# Patient Record
Sex: FEMALE | Race: BLACK OR AFRICAN AMERICAN | NOT HISPANIC OR LATINO | Employment: FULL TIME | URBAN - METROPOLITAN AREA
[De-identification: names, ages, dates, MRNs, and addresses within clinical notes are randomized per-mention and may not be internally consistent; named-entity substitution may affect disease eponyms.]

---

## 2017-04-17 ENCOUNTER — HOSPITAL ENCOUNTER (INPATIENT)
Facility: HOSPITAL | Age: 32
LOS: 2 days | Discharge: HOME | DRG: 392 | End: 2017-04-19
Attending: EMERGENCY MEDICINE | Admitting: INTERNAL MEDICINE
Payer: MEDICAID

## 2017-04-17 DIAGNOSIS — F33.9 EPISODE OF RECURRENT MAJOR DEPRESSIVE DISORDER, UNSPECIFIED DEPRESSION EPISODE SEVERITY: ICD-10-CM

## 2017-04-17 DIAGNOSIS — R10.9 ABDOMINAL PAIN, UNSPECIFIED LOCATION: ICD-10-CM

## 2017-04-17 DIAGNOSIS — F41.9 ANXIETY: ICD-10-CM

## 2017-04-17 DIAGNOSIS — K52.9 COLITIS: Primary | ICD-10-CM

## 2017-04-17 LAB
ALBUMIN SERPL BCP-MCNC: 4.1 G/DL
ALP SERPL-CCNC: 70 U/L
ALT SERPL W/O P-5'-P-CCNC: 19 U/L
ANION GAP SERPL CALC-SCNC: 11 MMOL/L
AST SERPL-CCNC: 20 U/L
BASOPHILS NFR BLD: 0 %
BILIRUB SERPL-MCNC: 0.9 MG/DL
BUN SERPL-MCNC: 10 MG/DL
CALCIUM SERPL-MCNC: 9.7 MG/DL
CHLORIDE SERPL-SCNC: 111 MMOL/L
CO2 SERPL-SCNC: 19 MMOL/L
CREAT SERPL-MCNC: 0.9 MG/DL
DIFFERENTIAL METHOD: ABNORMAL
EOSINOPHIL NFR BLD: 2 %
ERYTHROCYTE [DISTWIDTH] IN BLOOD BY AUTOMATED COUNT: 14.9 %
EST. GFR  (AFRICAN AMERICAN): >60 ML/MIN/1.73 M^2
EST. GFR  (NON AFRICAN AMERICAN): >60 ML/MIN/1.73 M^2
GLUCOSE SERPL-MCNC: 152 MG/DL
HCG SERPL QL: NEGATIVE
HCT VFR BLD AUTO: 47.9 %
HGB BLD-MCNC: 15.8 G/DL
LIPASE SERPL-CCNC: 8 U/L
LYMPHOCYTES NFR BLD: 15 %
MCH RBC QN AUTO: 30.1 PG
MCHC RBC AUTO-ENTMCNC: 32.9 %
MCV RBC AUTO: 92 FL
MONOCYTES NFR BLD: 2 %
NEUTROPHILS NFR BLD: 79 %
NEUTS BAND NFR BLD MANUAL: 2 %
PLATELET # BLD AUTO: 233 K/UL
PMV BLD AUTO: 8.4 FL
POTASSIUM SERPL-SCNC: 4.5 MMOL/L
PROT SERPL-MCNC: 8.5 G/DL
RBC # BLD AUTO: 5.23 M/UL
SODIUM SERPL-SCNC: 141 MMOL/L
WBC # BLD AUTO: 22.5 K/UL

## 2017-04-17 PROCEDURE — 84703 CHORIONIC GONADOTROPIN ASSAY: CPT

## 2017-04-17 PROCEDURE — 96375 TX/PRO/DX INJ NEW DRUG ADDON: CPT

## 2017-04-17 PROCEDURE — 25000003 PHARM REV CODE 250: Performed by: EMERGENCY MEDICINE

## 2017-04-17 PROCEDURE — 63600175 PHARM REV CODE 636 W HCPCS: Performed by: EMERGENCY MEDICINE

## 2017-04-17 PROCEDURE — 85007 BL SMEAR W/DIFF WBC COUNT: CPT

## 2017-04-17 PROCEDURE — 85027 COMPLETE CBC AUTOMATED: CPT

## 2017-04-17 PROCEDURE — 86140 C-REACTIVE PROTEIN: CPT

## 2017-04-17 PROCEDURE — 36415 COLL VENOUS BLD VENIPUNCTURE: CPT

## 2017-04-17 PROCEDURE — 99285 EMERGENCY DEPT VISIT HI MDM: CPT | Mod: 25

## 2017-04-17 PROCEDURE — 96365 THER/PROPH/DIAG IV INF INIT: CPT

## 2017-04-17 PROCEDURE — 12000002 HC ACUTE/MED SURGE SEMI-PRIVATE ROOM

## 2017-04-17 PROCEDURE — 25500020 PHARM REV CODE 255

## 2017-04-17 PROCEDURE — 96376 TX/PRO/DX INJ SAME DRUG ADON: CPT

## 2017-04-17 PROCEDURE — 83690 ASSAY OF LIPASE: CPT

## 2017-04-17 PROCEDURE — 99222 1ST HOSP IP/OBS MODERATE 55: CPT | Mod: ,,, | Performed by: INTERNAL MEDICINE

## 2017-04-17 PROCEDURE — 80053 COMPREHEN METABOLIC PANEL: CPT

## 2017-04-17 RX ORDER — VENLAFAXINE HYDROCHLORIDE 150 MG/1
75 CAPSULE, EXTENDED RELEASE ORAL DAILY
COMMUNITY
End: 2018-12-11 | Stop reason: CLARIF

## 2017-04-17 RX ORDER — ONDANSETRON 2 MG/ML
4 INJECTION INTRAMUSCULAR; INTRAVENOUS
Status: COMPLETED | OUTPATIENT
Start: 2017-04-17 | End: 2017-04-17

## 2017-04-17 RX ORDER — MORPHINE SULFATE 4 MG/ML
4 INJECTION, SOLUTION INTRAMUSCULAR; INTRAVENOUS
Status: COMPLETED | OUTPATIENT
Start: 2017-04-17 | End: 2017-04-17

## 2017-04-17 RX ADMIN — PROMETHAZINE HYDROCHLORIDE 12.5 MG: 25 INJECTION, SOLUTION INTRAMUSCULAR; INTRAVENOUS at 09:04

## 2017-04-17 RX ADMIN — IOHEXOL 75 ML: 350 INJECTION, SOLUTION INTRAVENOUS at 10:04

## 2017-04-17 RX ADMIN — MORPHINE SULFATE 4 MG: 4 INJECTION INTRAVENOUS at 09:04

## 2017-04-17 RX ADMIN — LIDOCAINE HYDROCHLORIDE: 20 SOLUTION ORAL; TOPICAL at 08:04

## 2017-04-17 RX ADMIN — MORPHINE SULFATE 4 MG: 4 INJECTION INTRAVENOUS at 08:04

## 2017-04-17 RX ADMIN — PIPERACILLIN SODIUM AND TAZOBACTAM SODIUM 4.5 G: 4; .5 INJECTION, POWDER, LYOPHILIZED, FOR SOLUTION INTRAVENOUS at 09:04

## 2017-04-17 RX ADMIN — ONDANSETRON 4 MG: 2 INJECTION INTRAMUSCULAR; INTRAVENOUS at 08:04

## 2017-04-17 NOTE — IP AVS SNAPSHOT
[address] [place] [address]  Phone: [phone]           Patient Discharge Instructions   Our goal is to set you up for success. This packet includes information on your condition, medications, and your home care.  It will help you care for yourself to prevent having to return to the hospital.     Please ask your nurse if you have any questions.      There are many details to remember when preparing to leave the hospital. Here is what you will need to do:    1. Take your medicine. If you are prescribed medications, review your Medication List on the following pages. You may have new medications to  at the pharmacy and others that you'll need to stop taking. Review the instructions for how and when to take your medications. Talk with your doctor or nurses if you are unsure of what to do.     2. Go to your follow-up appointments. Specific follow-up information is listed in the following pages. Your may be contacted by a nurse or clinical provider about future appointments. Be sure we have all of the phone numbers to reach you. Please contact your provider's office if you are unable to make an appointment.     3. Watch for warning signs. Your doctor or nurse will give you detailed warning signs to watch for and when to call for assistance. These instructions may also include educational information about your condition. If you experience any of warning signs to your health, call your doctor.           [facility]  Unless otherwise directed by your provider, please   contact [facility], our nurse care line   that is available for 24/7 assistance.     [phone] (toll-free)     Registered nurses in the [facility]   provide: appointment scheduling, clinical advisement, health education, and other advisory services.                  ** Verify the list of medication(s) below is accurate and up to date. Carry this with you in case of  emergency. If your medications have changed, please notify your healthcare provider.             Medication List      START taking these medications        Additional Info                      ciprofloxacin HCl 500 MG tablet   Commonly known as:  CIPRO   Quantity:  14 tablet   Refills:  0   Dose:  500 mg    Instructions:  Take 1 tablet (500 mg total) by mouth every 12 (twelve) hours.     Begin Date    AM    Noon    PM    Bedtime       metronidazole 500 MG tablet   Commonly known as:  FLAGYL   Quantity:  21 tablet   Refills:  0   Dose:  500 mg   Indications:  infectious diarrhea    Last time this was given:  500 mg on 4/19/2017  1:05 PM   Instructions:  Take 1 tablet (500 mg total) by mouth every 8 (eight) hours.     Begin Date    AM    Noon    PM    Bedtime         CONTINUE taking these medications        Additional Info                      alprazolam 1 MG tablet   Commonly known as:  XANAX   Quantity:  12 tablet   Refills:  0   Dose:  1 mg    Last time this was given:  1 mg on 4/19/2017 10:16 AM   Instructions:  Take 1 tablet (1 mg total) by mouth 2 (two) times daily as needed for Anxiety.     Begin Date    AM    Noon    PM    Bedtime       dicyclomine 10 MG capsule   Commonly known as:  BENTYL   Refills:  0   Dose:  10 mg    Instructions:  Take 10 mg by mouth 4 (four) times daily before meals and nightly.     Begin Date    AM    Noon    PM    Bedtime       fish oil-omega-3 fatty acids 300-1,000 mg capsule   Refills:  0   Dose:  1 g    Instructions:  Take 1 g by mouth 2 (two) times daily.     Begin Date    AM    Noon    PM    Bedtime       valacyclovir 500 MG tablet   Commonly known as:  VALTREX   Refills:  0   Dose:  500 mg    Instructions:  Take 500 mg by mouth 2 (two) times daily.     Begin Date    AM    Noon    PM    Bedtime       venlafaxine 150 MG Cp24   Commonly known as:  EFFEXOR-XR   Refills:  0   Dose:  75 mg    Instructions:  Take 75 mg by mouth once daily.     Begin Date    AM    Noon    PM    Bedtime  "           Where to Get Your Medications      You can get these medications from any pharmacy     Bring a paper prescription for each of these medications     ciprofloxacin HCl 500 MG tablet    metronidazole 500 MG tablet                  Please bring to all follow up appointments:    1. A copy of your discharge instructions.  2. All medicines you are currently taking in their original bottles.  3. Identification and insurance card.    Please arrive 15 minutes ahead of scheduled appointment time.    Please call 24 hours in advance if you must reschedule your appointment and/or time.        Follow-up Information     Follow up with PCP.    Contact information:    Follow up with a GI specialist at earliest convenience.    Keep yourself well hydrated; Avoid highly concentrated juices and milk products for few days.        Discharge Instructions     Future Orders    Call MD for:     Comments:    For worsening symptoms, chest pain, shortness of breath, increased abdominal pain, high grade fever, stroke or stroke like symptoms, immediately go to the nearest Emergency Room or call 911 as soon as possible.    Diet general     Comments:    Cardiac/ 2 gram sodium low cholesterol diet    Questions:    Total calories:      Fat restriction, if any:      Protein restriction, if any:      Na restriction, if any:      Fluid restriction:      Additional restrictions:      Other restrictions (specify):     Comments:    Fall precautions        Primary Diagnosis     Your primary diagnosis was:  Inflammation Of Large Intestine      Admission Information     Date & Time Provider Department CSN    4/17/2017  7:43 PM Wilfredo Michaud MD [facility] [id number]      Care Providers     Provider Role Specialty Primary office phone    Wilfredo Michaud MD Attending Provider Internal Medicine [phone]      Your Vitals Were     BP Pulse Temp Resp Height Weight    115/79 84 98.1 °F (36.7 °C) (Oral) 16 5' 5" (1.651 m) 79.4 kg (175 lb)    " SpO2 BMI             100% 29.12 kg/m2         Recent Lab Values     No lab values to display.      Pending Labs     Order Current Status    E. coli 0157 antigen In process    Stool Exam-Ova,Cysts,Parasites In process    Stool culture Preliminary result      Allergies as of 4/19/2017     No Known Allergies      Advance Directives     An advance directive is a document which, in the event you are no longer able to make decisions for yourself, tells your healthcare team what kind of treatment you do or do not want to receive, or who you would like to make those decisions for you.  If you do not currently have an advance directive, [facility] encourages you to create one.  For more information call:  [phone]), [phone] ([phone]),  or log on to [email or web address].        Smoking Cessation     If you would like to quit smoking:   You may be eligible for free services if you are a [place] resident and started smoking cigarettes before September 1, 1988.  Call the Smoking Cessation Trust ([facility]) toll free at ([phone] or [phone]) 924-3764.   Call 2-067-QUIT-NOW if you do not meet the above criteria.   Contact us via email: [email or web address]   View our website for more information: [email or web address]        Language Assistance Services     ATTENTION: Language assistance services are available, free of charge. Please call [phone].      ATENCIÓN: Si habla español, tiene a rios disposición servicios gratuitos de asistencia lingüística. Llame al [phone].     CHÚ Ý: N?u b?n nói Ti?ng Vi?t, có các d?ch v? h? tr? ngôn ng? mi?n phí dành cho b?n. G?i s? [phone].         [facility] complies with applicable Federal civil rights laws and does not discriminate on the basis of race, color, national origin, age, disability, or sex.

## 2017-04-18 PROBLEM — F32.9 MAJOR DEPRESSIVE DISORDER WITH CURRENT ACTIVE EPISODE: Status: ACTIVE | Noted: 2017-04-18

## 2017-04-18 PROBLEM — F41.9 ANXIETY: Status: ACTIVE | Noted: 2017-04-18

## 2017-04-18 PROBLEM — R10.9 ABDOMINAL PAIN: Status: ACTIVE | Noted: 2017-04-18

## 2017-04-18 LAB
ANION GAP SERPL CALC-SCNC: 11 MMOL/L
BASOPHILS # BLD AUTO: 0 K/UL
BASOPHILS NFR BLD: 0.3 %
BUN SERPL-MCNC: 8 MG/DL
C DIFF GDH STL QL: NEGATIVE
C DIFF TOX A+B STL QL IA: NEGATIVE
CALCIUM SERPL-MCNC: 9.3 MG/DL
CHLORIDE SERPL-SCNC: 107 MMOL/L
CO2 SERPL-SCNC: 23 MMOL/L
CREAT SERPL-MCNC: 0.9 MG/DL
CRP SERPL-MCNC: 8.2 MG/L
DIFFERENTIAL METHOD: ABNORMAL
EOSINOPHIL # BLD AUTO: 0 K/UL
EOSINOPHIL NFR BLD: 0.1 %
ERYTHROCYTE [DISTWIDTH] IN BLOOD BY AUTOMATED COUNT: 14.5 %
EST. GFR  (AFRICAN AMERICAN): >60 ML/MIN/1.73 M^2
EST. GFR  (NON AFRICAN AMERICAN): >60 ML/MIN/1.73 M^2
GLUCOSE SERPL-MCNC: 118 MG/DL
HCT VFR BLD AUTO: 44.6 %
HGB BLD-MCNC: 14.5 G/DL
LYMPHOCYTES # BLD AUTO: 1.1 K/UL
LYMPHOCYTES NFR BLD: 8 %
MCH RBC QN AUTO: 29.8 PG
MCHC RBC AUTO-ENTMCNC: 32.5 %
MCV RBC AUTO: 92 FL
MONOCYTES # BLD AUTO: 0.6 K/UL
MONOCYTES NFR BLD: 4.1 %
NEUTROPHILS # BLD AUTO: 11.9 K/UL
NEUTROPHILS NFR BLD: 87.5 %
PLATELET # BLD AUTO: 231 K/UL
PMV BLD AUTO: 8.4 FL
POTASSIUM SERPL-SCNC: 3.7 MMOL/L
RBC # BLD AUTO: 4.86 M/UL
SODIUM SERPL-SCNC: 141 MMOL/L
WBC # BLD AUTO: 13.6 K/UL

## 2017-04-18 PROCEDURE — 36415 COLL VENOUS BLD VENIPUNCTURE: CPT

## 2017-04-18 PROCEDURE — 25000003 PHARM REV CODE 250: Performed by: NURSE PRACTITIONER

## 2017-04-18 PROCEDURE — 63600175 PHARM REV CODE 636 W HCPCS: Performed by: NURSE PRACTITIONER

## 2017-04-18 PROCEDURE — 85025 COMPLETE CBC W/AUTO DIFF WBC: CPT

## 2017-04-18 PROCEDURE — 25000003 PHARM REV CODE 250: Performed by: INTERNAL MEDICINE

## 2017-04-18 PROCEDURE — 87449 NOS EACH ORGANISM AG IA: CPT

## 2017-04-18 PROCEDURE — 80048 BASIC METABOLIC PNL TOTAL CA: CPT

## 2017-04-18 PROCEDURE — 99232 SBSQ HOSP IP/OBS MODERATE 35: CPT | Mod: ,,, | Performed by: INTERNAL MEDICINE

## 2017-04-18 PROCEDURE — 87209 SMEAR COMPLEX STAIN: CPT

## 2017-04-18 PROCEDURE — 12000002 HC ACUTE/MED SURGE SEMI-PRIVATE ROOM

## 2017-04-18 PROCEDURE — 87427 SHIGA-LIKE TOXIN AG IA: CPT

## 2017-04-18 PROCEDURE — 87046 STOOL CULTR AEROBIC BACT EA: CPT | Mod: 59

## 2017-04-18 PROCEDURE — 87045 FECES CULTURE AEROBIC BACT: CPT

## 2017-04-18 RX ORDER — METRONIDAZOLE 500 MG/1
500 TABLET ORAL EVERY 8 HOURS
Status: DISCONTINUED | OUTPATIENT
Start: 2017-04-18 | End: 2017-04-19 | Stop reason: HOSPADM

## 2017-04-18 RX ORDER — CIPROFLOXACIN 2 MG/ML
400 INJECTION, SOLUTION INTRAVENOUS
Status: DISCONTINUED | OUTPATIENT
Start: 2017-04-18 | End: 2017-04-18

## 2017-04-18 RX ORDER — ACETAMINOPHEN 325 MG/1
650 TABLET ORAL EVERY 4 HOURS PRN
Status: DISCONTINUED | OUTPATIENT
Start: 2017-04-18 | End: 2017-04-19 | Stop reason: HOSPADM

## 2017-04-18 RX ORDER — AMOXICILLIN 500 MG
1 CAPSULE ORAL 2 TIMES DAILY
COMMUNITY
End: 2020-12-22 | Stop reason: CLARIF

## 2017-04-18 RX ORDER — DICYCLOMINE HYDROCHLORIDE 10 MG/1
10 CAPSULE ORAL EVERY 6 HOURS PRN
Status: DISCONTINUED | OUTPATIENT
Start: 2017-04-18 | End: 2017-04-19 | Stop reason: HOSPADM

## 2017-04-18 RX ORDER — DICYCLOMINE HYDROCHLORIDE 10 MG/ML
20 INJECTION INTRAMUSCULAR EVERY 6 HOURS PRN
Status: DISCONTINUED | OUTPATIENT
Start: 2017-04-18 | End: 2017-04-18

## 2017-04-18 RX ORDER — ONDANSETRON 2 MG/ML
4 INJECTION INTRAMUSCULAR; INTRAVENOUS EVERY 6 HOURS PRN
Status: DISCONTINUED | OUTPATIENT
Start: 2017-04-18 | End: 2017-04-19 | Stop reason: HOSPADM

## 2017-04-18 RX ORDER — METRONIDAZOLE 500 MG/100ML
500 INJECTION, SOLUTION INTRAVENOUS
Status: DISCONTINUED | OUTPATIENT
Start: 2017-04-18 | End: 2017-04-18

## 2017-04-18 RX ORDER — MORPHINE SULFATE 4 MG/ML
4 INJECTION, SOLUTION INTRAMUSCULAR; INTRAVENOUS EVERY 4 HOURS PRN
Status: DISCONTINUED | OUTPATIENT
Start: 2017-04-18 | End: 2017-04-19 | Stop reason: HOSPADM

## 2017-04-18 RX ORDER — VALACYCLOVIR HYDROCHLORIDE 500 MG/1
500 TABLET, FILM COATED ORAL 2 TIMES DAILY
Status: DISCONTINUED | OUTPATIENT
Start: 2017-04-18 | End: 2017-04-19 | Stop reason: HOSPADM

## 2017-04-18 RX ORDER — VENLAFAXINE HYDROCHLORIDE 37.5 MG/1
75 CAPSULE, EXTENDED RELEASE ORAL DAILY
Status: DISCONTINUED | OUTPATIENT
Start: 2017-04-18 | End: 2017-04-19 | Stop reason: HOSPADM

## 2017-04-18 RX ORDER — HYDROCODONE BITARTRATE AND ACETAMINOPHEN 5; 325 MG/1; MG/1
1 TABLET ORAL EVERY 6 HOURS PRN
Status: DISCONTINUED | OUTPATIENT
Start: 2017-04-19 | End: 2017-04-19 | Stop reason: HOSPADM

## 2017-04-18 RX ORDER — SODIUM CHLORIDE 9 MG/ML
INJECTION, SOLUTION INTRAVENOUS CONTINUOUS
Status: DISCONTINUED | OUTPATIENT
Start: 2017-04-18 | End: 2017-04-19 | Stop reason: HOSPADM

## 2017-04-18 RX ORDER — ALPRAZOLAM 1 MG/1
1 TABLET ORAL 2 TIMES DAILY PRN
Status: DISCONTINUED | OUTPATIENT
Start: 2017-04-18 | End: 2017-04-19 | Stop reason: HOSPADM

## 2017-04-18 RX ADMIN — ALPRAZOLAM 1 MG: 1 TABLET ORAL at 09:04

## 2017-04-18 RX ADMIN — DICYCLOMINE HYDROCHLORIDE 20 MG: 20 INJECTION, SOLUTION INTRAMUSCULAR at 01:04

## 2017-04-18 RX ADMIN — PIPERACILLIN SODIUM AND TAZOBACTAM SODIUM 4.5 G: 4; .5 INJECTION, POWDER, LYOPHILIZED, FOR SOLUTION INTRAVENOUS at 01:04

## 2017-04-18 RX ADMIN — HYDROCODONE BITARTRATE AND ACETAMINOPHEN 1 TABLET: 5; 325 TABLET ORAL at 11:04

## 2017-04-18 RX ADMIN — ALPRAZOLAM 1 MG: 1 TABLET ORAL at 02:04

## 2017-04-18 RX ADMIN — PIPERACILLIN SODIUM AND TAZOBACTAM SODIUM 4.5 G: 4; .5 INJECTION, POWDER, LYOPHILIZED, FOR SOLUTION INTRAVENOUS at 05:04

## 2017-04-18 RX ADMIN — ONDANSETRON 4 MG: 2 INJECTION INTRAMUSCULAR; INTRAVENOUS at 07:04

## 2017-04-18 RX ADMIN — METRONIDAZOLE 500 MG: 500 TABLET ORAL at 10:04

## 2017-04-18 RX ADMIN — ALPRAZOLAM 1 MG: 1 TABLET ORAL at 07:04

## 2017-04-18 RX ADMIN — PIPERACILLIN SODIUM AND TAZOBACTAM SODIUM 4.5 G: 4; .5 INJECTION, POWDER, LYOPHILIZED, FOR SOLUTION INTRAVENOUS at 10:04

## 2017-04-18 RX ADMIN — MORPHINE SULFATE 4 MG: 4 INJECTION INTRAVENOUS at 11:04

## 2017-04-18 RX ADMIN — CIPROFLOXACIN 400 MG: 2 INJECTION, SOLUTION INTRAVENOUS at 02:04

## 2017-04-18 RX ADMIN — SODIUM CHLORIDE: 0.9 INJECTION, SOLUTION INTRAVENOUS at 02:04

## 2017-04-18 NOTE — ED NOTES
Overall condition much improved from arrival. Pt denies pain at this time. Reminded patient of NPO status and provided with ice chips per MD order

## 2017-04-18 NOTE — H&P
[facility] Hospital Medicine  History & Physical    Patient Name: Eva Escalera  MRN: [id number]  Admission Date: 2017  Attending Physician: Wilfredo Michaud MD   Primary Care Provider: Primary Doctor No         Patient information was obtained from patient and ER records.     Subjective:     Principal Problem:Colitis    Chief Complaint:   Chief Complaint   Patient presents with    Abdominal Pain     with N/V/D        HPI: Ms Escalera is a 30 yo female  admitted to the services of hospital medicine via the ER for colitis. Pt states that she became acutely ill  night. First began with nausea, vomiting and severe abdominal pain. Also having several episodes of diarrhea. Pt was found to have colitis per CT and a white count of 22,000. Denies any fever or chills. No previous history of colitis. Diagnosed with IBS several years ago. Denies any hematochezia or melena. History of depression and anxiety. Under the care of a psychiatrist.      Past Medical History:   Diagnosis Date    Anxiety     Depression     Herpes     IBS (irritable bowel syndrome)        Past Surgical History:   Procedure Laterality Date     SECTION         Review of patient's allergies indicates:  No Known Allergies    No current facility-administered medications on file prior to encounter.      Current Outpatient Prescriptions on File Prior to Encounter   Medication Sig    dicyclomine (BENTYL) 10 MG capsule Take 10 mg by mouth 4 (four) times daily before meals and nightly.    valacyclovir (VALTREX) 500 MG tablet Take 500 mg by mouth 2 (two) times daily.    alprazolam (XANAX) 1 MG tablet Take 1 tablet (1 mg total) by mouth 2 (two) times daily as needed for Anxiety.     Family History     None        Social History Main Topics    Smoking status: Former Smoker     Packs/day: 1.00     Types: Cigarettes     Quit date: 2014    Smokeless tobacco: Not on file    Alcohol use Yes    Drug use: Not on file     Sexual activity: Yes     Birth control/ protection: Implant     Review of Systems   Constitutional: Negative for activity change, appetite change, diaphoresis and fever.   HENT: Negative for congestion and facial swelling.    Eyes: Negative for redness and itching.   Respiratory: Negative for cough, chest tightness, shortness of breath and wheezing.    Cardiovascular: Negative for chest pain, palpitations and leg swelling.   Gastrointestinal: Positive for abdominal pain, diarrhea, nausea and vomiting. Negative for abdominal distention, anal bleeding, blood in stool, constipation and rectal pain.   Endocrine: Negative for cold intolerance and heat intolerance.   Genitourinary: Negative for difficulty urinating, dysuria, flank pain, frequency, hematuria and urgency.   Musculoskeletal: Negative for arthralgias, back pain, joint swelling, myalgias and neck pain.   Neurological: Negative for dizziness, syncope, weakness and headaches.   Psychiatric/Behavioral: Negative for agitation and confusion. The patient is not nervous/anxious.      Objective:     Vital Signs (Most Recent):  Temp: 99 °F (37.2 °C) (04/18/17 0044)  Pulse: 95 (04/18/17 0050)  Resp: 16 (04/18/17 0044)  BP: (!) 147/97 (04/18/17 0050)  SpO2: 95 % (04/18/17 0050) Vital Signs (24h Range):  Temp:  [97.4 °F (36.3 °C)-99 °F (37.2 °C)] 99 °F (37.2 °C)  Pulse:  [79-95] 95  Resp:  [16] 16  SpO2:  [95 %-100 %] 95 %  BP: (128-147)/(88-97) 147/97     Weight: 79.4 kg (175 lb)  Body mass index is 29.12 kg/(m^2).    Physical Exam   Constitutional: She is oriented to person, place, and time. She appears well-developed and well-nourished. No distress.   HENT:   Head: Normocephalic and atraumatic.   Mouth/Throat: Oropharynx is clear and moist.   Eyes: Conjunctivae and EOM are normal. Pupils are equal, round, and reactive to light. Right eye exhibits no discharge. Left eye exhibits no discharge. No scleral icterus.   Neck: No JVD present. No thyromegaly present.    Cardiovascular: Normal rate, regular rhythm and normal heart sounds.  Exam reveals no gallop and no friction rub.    No murmur heard.  Pulmonary/Chest: Effort normal and breath sounds normal. No respiratory distress. She has no wheezes. She has no rales. She exhibits no tenderness.   Abdominal: Soft. Bowel sounds are normal. She exhibits no distension. There is tenderness. There is no rebound and no guarding.   Musculoskeletal: She exhibits no edema or tenderness.   Lymphadenopathy:     She has no cervical adenopathy.   Neurological: She is alert and oriented to person, place, and time. No cranial nerve deficit.   Skin: Skin is warm and dry. She is not diaphoretic.   Psychiatric: She has a normal mood and affect. Her behavior is normal. Judgment and thought content normal. Her speech is rapid and/or pressured.   Vitals reviewed.       Significant Labs:   CBC:   Recent Labs  Lab 04/17/17 2008   WBC 22.50*   HGB 15.8   HCT 47.9        CMP:   Recent Labs  Lab 04/17/17 2008      K 4.5   *   CO2 19*   *   BUN 10   CREATININE 0.9   CALCIUM 9.7   PROT 8.5*   ALBUMIN 4.1   BILITOT 0.9   ALKPHOS 70   AST 20   ALT 19   ANIONGAP 11   EGFRNONAA >60           Assessment/Plan:     No new Assessment & Plan notes have been filed under this hospital service since the last note was generated.  Service: Hospital Medicine    VTE Risk Mitigation         Ordered     Low Risk of VTE  Once      04/18/17 0038        Vianey Hall NP  Department of Hospital Medicine   [facility]   Head atraumatic, normal cephalic shape.

## 2017-04-18 NOTE — H&P
[facility] Hospital Medicine  History & Physical    Patient Name: Eva Escalera  MRN: [id number]  Admission Date: 4/17/2017  Attending Physician: Wilfredo Michaud MD   Primary Care Provider: Primary Doctor No         Patient information was obtained from patient and ER records.     Subjective:     Principal Problem:Colitis    Chief Complaint:   Chief Complaint   Patient presents with    Abdominal Pain     with N/V/D        HPI: Ms Escalera is a 32 yo female  admitted to the services of hospital medicine via the ER for colitis. Pt states that she became acutely ill Sunday night. First began with nausea, vomiting and severe abdominal pain. Also having several episodes of diarrhea. Pt was found to have colitis per CT and a white count of 22,000. Denies any fever or chills. No previous history of colitis. Diagnosed with IBS several years ago. Denies any hematochezia or melena. History of depression and anxiety. Under the care of a psychiatrist.      No new subjective & objective note has been filed under this hospital service since the last note was generated.    Assessment/Plan:     Anxiety  Chronic with acute exacerbation related to current illness  Will resume prn XanaX, informed pt that it cannot be taken in tandem with Morphine  Continue Effexor     Major depressive disorder with current active episode  Chronic  Hx of SI but denies any thoughts of suicide at present  Continue Effexor  Under the care of Dr. Negrete outpatient      Abdominal pain due to acute colitis  Acute  Most likely infectious for CRP is normal  Clear liquids sparingly to allow for bowel rest  Empiric coverage with Zosyn as monotherapy, IV flagyl not available  IV fluids  Anti-emetics prn  Requiring narcotic analgesic  Will need colonoscopy in 2-3 months      VTE Risk Mitigation         Ordered     Low Risk of VTE  Once      04/18/17 0038        Vianey Hall NP  Department of Hospital Medicine   [facility]

## 2017-04-18 NOTE — ASSESSMENT & PLAN NOTE
Acute  Most likely infectious for CRP is normal  Clear liquids sparingly to allow for bowel rest  Empiric coverage with Zosyn as monotherapy, IV flagyl not available  IV fluids  Anti-emetics prn  Requiring narcotic analgesic  Will need colonoscopy in 2-3 months

## 2017-04-18 NOTE — PROGRESS NOTES
Progress Note  Hospital Medicine  Patient Name:Eva Escalera  MRN:  [id number]  Patient Class: IP- Inpatient  Admit Date: 4/17/2017  Length of Stay: 1 days  Expected Discharge Date:   Attending Physician: Wilfredo Michaud MD  Primary Care Provider:  Primary Doctor No    SUBJECTIVE:     Principal Problem: Colitis  Initial history of present illness: Ms Escalera is a 30 yo female admitted to the services of [facility] medicine via the ER for colitis. Pt states that she became acutely ill Sunday night. First began with nausea, vomiting and severe abdominal pain. Also having several episodes of diarrhea. Pt was found to have colitis per CT and a white count of 22,000. Denies any fever or chills. No previous history of colitis. Diagnosed with IBS several years ago. Denies any hematochezia or melena. History of depression and anxiety. Under the care of a psychiatrist.     PMH/PSH/SH/FH/Meds: reviewed.    Symptoms/Review of Systems:  Patient is reporting nausea, vomiting and diarrhea. Labile affect. No shortness of breath, cough, chest pain or headache, fever or abdominal pain.     Diet:  Start liquids  Activity level: Normal.    Pain:  Patient reports no pain.       OBJECTIVE:   Vital Signs (Most Recent):      Temp: 99 °F (37.2 °C) (04/18/17 0044)  Pulse: 95 (04/18/17 0050)  Resp: 16 (04/18/17 0044)  BP: (!) 147/97 (04/18/17 0050)  SpO2: 95 % (04/18/17 0050)       Vital Signs Range (Last 24H):  Temp:  [97.4 °F (36.3 °C)-99 °F (37.2 °C)]   Pulse:  [79-95]   Resp:  [16]   BP: (128-147)/(88-97)   SpO2:  [95 %-100 %]     Weight: 79.4 kg (175 lb)  Body mass index is 29.12 kg/(m^2).    Intake/Output Summary (Last 24 hours) at 04/18/17 0742  Last data filed at 04/18/17 0544   Gross per 24 hour   Intake              480 ml   Output                0 ml   Net              480 ml     Physical Examination:  Constitutional: She is oriented to person, place, and time. She appears well-developed and well-nourished. No distress.   HENT:    Head: Normocephalic and atraumatic.   Mouth/Throat: Oropharynx is clear and moist.   Eyes: Conjunctivae and EOM are normal. Pupils are equal, round, and reactive to light. Right eye exhibits no discharge. Left eye exhibits no discharge. No scleral icterus.   Neck: No JVD present. No thyromegaly present.   Cardiovascular: Normal rate, regular rhythm and normal heart sounds. Exam reveals no gallop and no friction rub.   No murmur heard.  Pulmonary/Chest: Effort normal and breath sounds normal. No respiratory distress. She has no wheezes. She has no rales. She exhibits no tenderness.   Abdominal: Soft. Bowel sounds are normal. She exhibits no distension. No tenderness  Musculoskeletal: She exhibits no edema or tenderness.   Lymphadenopathy:   She has no cervical adenopathy.   Neurological: She is alert and oriented to person, place, and time. No cranial nerve deficit.   Skin: Skin is warm and dry. She is not diaphoretic.   Psychiatric: She has a normal mood and affect. Her behavior is normal. Judgment and thought content normal. Her speech is rapid and/or pressured.     CBC:    Recent Labs  Lab 04/17/17 2008 04/18/17  0613   WBC 22.50* 13.60*   RBC 5.23 4.86   HGB 15.8 14.5   HCT 47.9 44.6    231   MCV 92 92   MCH 30.1 29.8   MCHC 32.9 32.5   BMP    Recent Labs  Lab 04/17/17 2008 04/18/17  0613   * 118*    141   K 4.5 3.7   * 107   CO2 19* 23   BUN 10 8   CREATININE 0.9 0.9   CALCIUM 9.7 9.3      Diagnostic Results:  Microbiology Results (last 7 days)     Procedure Component Value Units Date/Time    Clostridium difficile EIA [[id number]]     Order Status:  No result Specimen:  Stool from Stool     Stool culture [[id number]]     Order Status:  No result Specimen:  Stool from Stool          CT abdomen:   1. Small volume of fluid within a relatively nondistended colon. An acute colitis is possible in the appropriate clinical setting.  2. Otherwise, no acute CT findings in the abdomen or pelvis  to explain this patient's symptoms.    CXR: No acute radiographic findings in the chest.    Assessment/Plan:   Abdominal pain due to acute colitis  Acute  Most likely infectious for CRP is normal  Start liquids and slowly advance  Empiric coverage with Zosyn as monotherapy, IV flagyl.  IV fluids  Anti-emetics prn  Requiring narcotic analgesic  Will need colonoscopy in 2-3 months    Anxiety  Chronic with acute exacerbation related to current illness  Will resume prn XanaX, informed pt that it cannot be taken in tandem with Morphine  Continue Effexor      Major depressive disorder with current active episode  Chronic  Hx of SI but denies any thoughts of suicide at present  Continue Effexor  Under the care of Dr. Negrete outpatient       VTE Risk Mitigation         Ordered     Place GLADYS hose  Until discontinued      04/18/17 1629     Low Risk of VTE  Once      04/18/17 0038        Wilfredo Michaud MD  Department of Hospital Medicine   [facility]

## 2017-04-18 NOTE — ASSESSMENT & PLAN NOTE
Chronic  Hx of SI but denies any thoughts of suicide at present  Continue Effexor  Under the care of Dr. Penelope dietz

## 2017-04-18 NOTE — ED PROVIDER NOTES
Encounter Date: 2017    SCRIBE #1 NOTE: I, Tory Alcantar , am scribing for, and in the presence of,  Dr. Onofre. I have scribed the entire note.       History     Chief Complaint   Patient presents with    Abdominal Pain     with N/V/D     Review of patient's allergies indicates:  No Known Allergies  HPI Comments:     2017  7:53 PM     Chief Complaint: Abdominal pain       The patient is a 31 y.o. Female with a PMHx of IBS, depression, and anxiety who is presenting with the acute onset of abdominal pain that began x 12 hours PTA. The pt notes that the pain is localized to the epigastric region, is constant, and severe. She denies any exacerbating or mitigating factors. She confirms associated nausea, vomiting, and diarrhea which is watery. No blood present in emesis or stool. Pt denies previous episodes of similar pain. She denies recent fevers, dysuria, vaginal discharge, back pain, or abdominal distention. No pertinent past surgical hx. No pertinent social hx.           The history is provided by the patient, medical records and the EMS personnel.     Past Medical History:   Diagnosis Date    Anxiety     Depression     Herpes     IBS (irritable bowel syndrome)      Past Surgical History:   Procedure Laterality Date     SECTION       History reviewed. No pertinent family history.  Social History   Substance Use Topics    Smoking status: Former Smoker     Packs/day: 1.00     Types: Cigarettes     Quit date: 2014    Smokeless tobacco: None    Alcohol use Yes     Review of Systems   Constitutional: Negative for fever.   HENT: Negative for sore throat.    Eyes: Negative for visual disturbance.   Respiratory: Negative for shortness of breath.    Cardiovascular: Negative for chest pain.   Gastrointestinal: Positive for abdominal pain (epigastric pain), diarrhea, nausea and vomiting.   Genitourinary: Negative for dysuria.   Musculoskeletal: Negative for back pain.   Skin: Negative for  rash.   Neurological: Negative for weakness.   Hematological: Does not bruise/bleed easily.   Psychiatric/Behavioral: Negative for confusion.       Physical Exam   Initial Vitals   BP Pulse Resp Temp SpO2   -- -- -- -- --            Physical Exam    Nursing note and vitals reviewed.  Constitutional: She appears well-developed and well-nourished. She appears distressed (Mild ).   HENT:   Head: Normocephalic and atraumatic.   Right Ear: External ear normal.   Left Ear: External ear normal.   Eyes: Conjunctivae are normal. Pupils are equal, round, and reactive to light. Right eye exhibits no discharge. Left eye exhibits no discharge.   Neck: Normal range of motion. Neck supple.   Cardiovascular: Normal rate, regular rhythm and normal heart sounds. Exam reveals no gallop and no friction rub.    No murmur heard.  Pulmonary/Chest: Breath sounds normal. She has no wheezes. She has no rhonchi. She has no rales.   Abdominal: Soft. There is tenderness (Mid epigastric TTP) in the epigastric area. There is guarding. There is no rebound.   Musculoskeletal: Normal range of motion.   Neurological: She is alert.   Skin: Skin is warm and dry.   Psychiatric: She has a normal mood and affect.         ED Course   Procedures  Labs Reviewed   CBC W/ AUTO DIFFERENTIAL - Abnormal; Notable for the following:        Result Value    WBC 22.50 (*)     RDW 14.9 (*)     MPV 8.4 (*)     Gran% 79.0 (*)     Lymph% 15.0 (*)     Mono% 2.0 (*)     All other components within normal limits   COMPREHENSIVE METABOLIC PANEL - Abnormal; Notable for the following:     Chloride 111 (*)     CO2 19 (*)     Glucose 152 (*)     Total Protein 8.5 (*)     All other components within normal limits   LIPASE   PREGNANCY TEST SCREENING, SERUM   POCT URINE PREGNANCY         Imaging Results         X-Ray Chest AP Portable (In process)                Medical Decision Making:   Patient has epigastric abdominal pain with guarding in the epigastrium.  White blood cell count  was 22.  She takes Goody powders every day.  I was worried a perforated viscus.  There are no free air and exam.  Vital signs are stable.  I will order a CT abdomen pelvis to further evaluate.  I doubt this is acute coronary syndrome given no appreciable tenderness in the epigastrium.  Pregnancy test is pending.    11:12 PM CT shows diffuse colitis and thickening of the distal ileum.  I question whether or not this is ulcerative colitis or Crohn's or infectious colitis.  Patient was given a dose of Zosyn here in the emergency department earlier with his cousin was concern originally met a perforated viscus with her NSAID use and white count with epigastric pain however given the CT findings still think she needs to be admitted for pain control and further evaluation by [facility]            Scribe Attestation:   Scribe #1: I performed the above scribed service and the documentation accurately describes the services I performed. I attest to the accuracy of the note.    Attending Attestation:           Physician Attestation for Scribe:  Physician Attestation Statement for Scribe #1: I, Dr. Onofre , reviewed documentation, as scribed by Tory Alcantar in my presence, and it is both accurate and complete.                 ED Course     Clinical Impression:   The encounter diagnosis was Colitis.          Josiah Onofre MD  04/17/17 6895

## 2017-04-18 NOTE — ED NOTES
Provided pt with wipes and assisted to bathroom to clean herself of incontinence. Stayed outside the door and verbally checked on patient continuously. Linens changed. Assisted pt back to room. Bed in lowest locked position, call light within reach, side rails up x 2, warm blankets and pillow provided

## 2017-04-18 NOTE — PLAN OF CARE
The pt was meeting with the nurse and I completed the assessment with her family member and emergency contact, Ladonna Abel [phone]. The pt lives alone with her young son. She is independent in ADL and does not use any DME or HH. She uses [facility] and sees RILEY Hayden. She has Medicaid insurance and uses [facility]. Ellie Garcia, [facility]     04/18/17 1122   Discharge Assessment   Assessment Type Discharge Planning Assessment   Confirmed/corrected address and phone number on facesheet? Yes   Assessment information obtained from? Caregiver   Communicated expected length of stay with patient/caregiver no   Type of Healthcare Directive Received (if needed [facility] emergency contact Ladonna [phone])   If Healthcare Directive is received, is it scanned into Epic? no (comment)   Prior to hospitilization cognitive status: Alert/Oriented   Prior to hospitalization functional status: Independent   Current cognitive status: Alert/Oriented   Current Functional Status: Independent   Arrived From home or self-care   Lives With alone;child(gabby), dependent   Able to Return to Prior Arrangements yes   Is patient able to care for self after discharge? Yes   How many people do you have in your home that can help with your care after discharge? 1   Who are your caregiver(s) and their phone number(s)? ladonna [phone]   Readmission Within The Last 30 Days no previous admission in last 30 days   Patient currently being followed by outpatient case management? No   Patient currently receives home health services? No   Does the patient currently use HME? No   Patient currently receives private duty nursing? No   Patient currently receives any other outside agency services? No   Equipment Currently Used at Home none   Do you have any problems affording any of your prescribed medications? No  ([facility] )   Is the patient taking medications as prescribed? yes   Do you have any financial concerns preventing you from  receiving the healthcare you need? No  (Medicaid )   On Dialysis? No   Does the patient receive services at the [facility]mad[facility] Clinic? No   Are there any open cases? No   Discharge Plan A Home   Discharge Plan B Home with family   Patient/Family In Agreement With Plan yes

## 2017-04-18 NOTE — PLAN OF CARE
Problem: Patient Care Overview  Goal: Plan of Care Review  Outcome: Ongoing (interventions implemented as appropriate)  Pt VSS and afebrile, pain controlled with medication, free of falls, trauma, injury, and skin breakdown, positions self in bed, ambulates to RR, on RA. Pt in low locked bed, call light in reach, safety precautions maintained.    Pt on contact isolation.

## 2017-04-18 NOTE — SUBJECTIVE & OBJECTIVE
Past Medical History:   Diagnosis Date    Anxiety     Depression     Herpes     IBS (irritable bowel syndrome)        Past Surgical History:   Procedure Laterality Date     SECTION         Review of patient's allergies indicates:  No Known Allergies    No current facility-administered medications on file prior to encounter.      Current Outpatient Prescriptions on File Prior to Encounter   Medication Sig    dicyclomine (BENTYL) 10 MG capsule Take 10 mg by mouth 4 (four) times daily before meals and nightly.    valacyclovir (VALTREX) 500 MG tablet Take 500 mg by mouth 2 (two) times daily.    alprazolam (XANAX) 1 MG tablet Take 1 tablet (1 mg total) by mouth 2 (two) times daily as needed for Anxiety.     Family History     None        Social History Main Topics    Smoking status: Former Smoker     Packs/day: 1.00     Types: Cigarettes     Quit date: 2014    Smokeless tobacco: Not on file    Alcohol use Yes    Drug use: Not on file    Sexual activity: Yes     Birth control/ protection: Implant     Review of Systems   Constitutional: Negative for activity change, appetite change, diaphoresis and fever.   HENT: Negative for congestion and facial swelling.    Eyes: Negative for redness and itching.   Respiratory: Negative for cough, chest tightness, shortness of breath and wheezing.    Cardiovascular: Negative for chest pain, palpitations and leg swelling.   Gastrointestinal: Positive for abdominal pain, diarrhea, nausea and vomiting. Negative for abdominal distention, anal bleeding, blood in stool, constipation and rectal pain.   Endocrine: Negative for cold intolerance and heat intolerance.   Genitourinary: Negative for difficulty urinating, dysuria, flank pain, frequency, hematuria and urgency.   Musculoskeletal: Negative for arthralgias, back pain, joint swelling, myalgias and neck pain.   Neurological: Negative for dizziness, syncope, weakness and headaches.   Psychiatric/Behavioral:  Negative for agitation and confusion. The patient is not nervous/anxious.      Objective:     Vital Signs (Most Recent):  Temp: 99 °F (37.2 °C) (04/18/17 0044)  Pulse: 95 (04/18/17 0050)  Resp: 16 (04/18/17 0044)  BP: (!) 147/97 (04/18/17 0050)  SpO2: 95 % (04/18/17 0050) Vital Signs (24h Range):  Temp:  [97.4 °F (36.3 °C)-99 °F (37.2 °C)] 99 °F (37.2 °C)  Pulse:  [79-95] 95  Resp:  [16] 16  SpO2:  [95 %-100 %] 95 %  BP: (128-147)/(88-97) 147/97     Weight: 79.4 kg (175 lb)  Body mass index is 29.12 kg/(m^2).    Physical Exam   Constitutional: She is oriented to person, place, and time. She appears well-developed and well-nourished. No distress.   HENT:   Head: Normocephalic and atraumatic.   Mouth/Throat: Oropharynx is clear and moist.   Eyes: Conjunctivae and EOM are normal. Pupils are equal, round, and reactive to light. Right eye exhibits no discharge. Left eye exhibits no discharge. No scleral icterus.   Neck: No JVD present. No thyromegaly present.   Cardiovascular: Normal rate, regular rhythm and normal heart sounds.  Exam reveals no gallop and no friction rub.    No murmur heard.  Pulmonary/Chest: Effort normal and breath sounds normal. No respiratory distress. She has no wheezes. She has no rales. She exhibits no tenderness.   Abdominal: Soft. Bowel sounds are normal. She exhibits no distension. There is tenderness. There is no rebound and no guarding.   Musculoskeletal: She exhibits no edema or tenderness.   Lymphadenopathy:     She has no cervical adenopathy.   Neurological: She is alert and oriented to person, place, and time. No cranial nerve deficit.   Skin: Skin is warm and dry. She is not diaphoretic.   Psychiatric: She has a normal mood and affect. Her behavior is normal. Judgment and thought content normal. Her speech is rapid and/or pressured.   Vitals reviewed.       Significant Labs:   CBC:   Recent Labs  Lab 04/17/17 2008   WBC 22.50*   HGB 15.8   HCT 47.9        CMP:   Recent Labs  Lab  04/17/17 2008      K 4.5   *   CO2 19*   *   BUN 10   CREATININE 0.9   CALCIUM 9.7   PROT 8.5*   ALBUMIN 4.1   BILITOT 0.9   ALKPHOS 70   AST 20   ALT 19   ANIONGAP 11   EGFRNONAA >60

## 2017-04-19 VITALS
OXYGEN SATURATION: 100 % | HEART RATE: 84 BPM | BODY MASS INDEX: 29.16 KG/M2 | HEIGHT: 65 IN | RESPIRATION RATE: 16 BRPM | DIASTOLIC BLOOD PRESSURE: 79 MMHG | SYSTOLIC BLOOD PRESSURE: 115 MMHG | TEMPERATURE: 98 F | WEIGHT: 175 LBS

## 2017-04-19 PROBLEM — F33.9 EPISODE OF RECURRENT MAJOR DEPRESSIVE DISORDER: Status: ACTIVE | Noted: 2017-04-19

## 2017-04-19 LAB
ANION GAP SERPL CALC-SCNC: 9 MMOL/L
BASOPHILS # BLD AUTO: 0 K/UL
BASOPHILS NFR BLD: 0.3 %
BUN SERPL-MCNC: 7 MG/DL
CALCIUM SERPL-MCNC: 8.7 MG/DL
CHLORIDE SERPL-SCNC: 111 MMOL/L
CO2 SERPL-SCNC: 22 MMOL/L
CREAT SERPL-MCNC: 0.9 MG/DL
DIFFERENTIAL METHOD: ABNORMAL
EOSINOPHIL # BLD AUTO: 0.3 K/UL
EOSINOPHIL NFR BLD: 2.9 %
ERYTHROCYTE [DISTWIDTH] IN BLOOD BY AUTOMATED COUNT: 14.2 %
EST. GFR  (AFRICAN AMERICAN): >60 ML/MIN/1.73 M^2
EST. GFR  (NON AFRICAN AMERICAN): >60 ML/MIN/1.73 M^2
GLUCOSE SERPL-MCNC: 91 MG/DL
HCT VFR BLD AUTO: 40 %
HGB BLD-MCNC: 13.1 G/DL
LYMPHOCYTES # BLD AUTO: 3.6 K/UL
LYMPHOCYTES NFR BLD: 40.5 %
MCH RBC QN AUTO: 29.8 PG
MCHC RBC AUTO-ENTMCNC: 32.7 %
MCV RBC AUTO: 91 FL
MONOCYTES # BLD AUTO: 0.6 K/UL
MONOCYTES NFR BLD: 6.4 %
NEUTROPHILS # BLD AUTO: 4.5 K/UL
NEUTROPHILS NFR BLD: 49.9 %
O+P STL TRI STN: NORMAL
PLATELET # BLD AUTO: 192 K/UL
PMV BLD AUTO: 8.1 FL
POTASSIUM SERPL-SCNC: 3.3 MMOL/L
RBC # BLD AUTO: 4.39 M/UL
SODIUM SERPL-SCNC: 142 MMOL/L
WBC # BLD AUTO: 9 K/UL

## 2017-04-19 PROCEDURE — 85025 COMPLETE CBC W/AUTO DIFF WBC: CPT

## 2017-04-19 PROCEDURE — 25000003 PHARM REV CODE 250: Performed by: NURSE PRACTITIONER

## 2017-04-19 PROCEDURE — 99239 HOSP IP/OBS DSCHRG MGMT >30: CPT | Mod: ,,, | Performed by: INTERNAL MEDICINE

## 2017-04-19 PROCEDURE — 63600175 PHARM REV CODE 636 W HCPCS: Performed by: NURSE PRACTITIONER

## 2017-04-19 PROCEDURE — 25000003 PHARM REV CODE 250: Performed by: INTERNAL MEDICINE

## 2017-04-19 PROCEDURE — 80048 BASIC METABOLIC PNL TOTAL CA: CPT

## 2017-04-19 PROCEDURE — 36415 COLL VENOUS BLD VENIPUNCTURE: CPT

## 2017-04-19 RX ORDER — POTASSIUM CHLORIDE 20 MEQ/1
40 TABLET, EXTENDED RELEASE ORAL ONCE
Status: COMPLETED | OUTPATIENT
Start: 2017-04-19 | End: 2017-04-19

## 2017-04-19 RX ORDER — CIPROFLOXACIN 500 MG/1
500 TABLET ORAL EVERY 12 HOURS
Qty: 14 TABLET | Refills: 0 | Status: SHIPPED | OUTPATIENT
Start: 2017-04-19 | End: 2018-12-11 | Stop reason: CLARIF

## 2017-04-19 RX ORDER — METRONIDAZOLE 500 MG/1
500 TABLET ORAL EVERY 8 HOURS
Qty: 21 TABLET | Refills: 0 | Status: SHIPPED | OUTPATIENT
Start: 2017-04-19 | End: 2018-12-11 | Stop reason: CLARIF

## 2017-04-19 RX ADMIN — ALPRAZOLAM 1 MG: 1 TABLET ORAL at 04:04

## 2017-04-19 RX ADMIN — PIPERACILLIN SODIUM AND TAZOBACTAM SODIUM 4.5 G: 4; .5 INJECTION, POWDER, LYOPHILIZED, FOR SOLUTION INTRAVENOUS at 05:04

## 2017-04-19 RX ADMIN — PIPERACILLIN SODIUM AND TAZOBACTAM SODIUM 4.5 G: 4; .5 INJECTION, POWDER, LYOPHILIZED, FOR SOLUTION INTRAVENOUS at 01:04

## 2017-04-19 RX ADMIN — METRONIDAZOLE 500 MG: 500 TABLET ORAL at 05:04

## 2017-04-19 RX ADMIN — METRONIDAZOLE 500 MG: 500 TABLET ORAL at 01:04

## 2017-04-19 RX ADMIN — HYDROCODONE BITARTRATE AND ACETAMINOPHEN 1 TABLET: 5; 325 TABLET ORAL at 04:04

## 2017-04-19 RX ADMIN — HYDROCODONE BITARTRATE AND ACETAMINOPHEN 1 TABLET: 5; 325 TABLET ORAL at 10:04

## 2017-04-19 RX ADMIN — POTASSIUM CHLORIDE 40 MEQ: 20 TABLET, EXTENDED RELEASE ORAL at 10:04

## 2017-04-19 RX ADMIN — ALPRAZOLAM 1 MG: 1 TABLET ORAL at 10:04

## 2017-04-19 NOTE — DISCHARGE SUMMARY
Discharge Summary  Hospital Medicine    Admit Date: 4/17/2017    Date and Time: 4/19/20172:54 PM    Discharge Attending Physician: Wilfredo Michaud MD    Primary Care Physician: Primary Doctor No    Diagnoses:  Active Hospital Problems    Diagnosis  POA    *Colitis [K52.9]  Yes    Anxiety [F41.9]  Yes    Major depressive disorder with current active episode [F32.9]  Yes    Abdominal pain due to acute colitis [R10.9]  Yes      Resolved Hospital Problems    Diagnosis Date Resolved POA   No resolved problems to display.     Discharged Condition: Good    Hospital Course:   Ms Escalera is a 30 yo female admitted to the services of hospital medicine via the ER for colitis. Pt stated that she became acutely ill Sunday night. First began with nausea, vomiting and severe abdominal pain. Also having several episodes of diarrhea. Pt was found to have colitis per CT and a white count of 22,000. Denies any fever or chills. No previous history of colitis. Diagnosed with IBS several years ago. Denied any hematochezia or melena. History of depression and anxiety. Under the care of a psychiatrist. Patient was admitted to Hospitalist medicine service. Patient was treated with IV antibiotics. GI symptoms improved. Patient is tolerating diet. Patient is encouraged to followed up with a GI specialist for future colonoscopy. Stool studies remained negative. Patient was discharged home in stable condition with following discharge plan of care. Total time with the patient was 30 minutes and greater than 50% was spent in counseling and coordination of care. The assessment and plan have been discussed at length. Physicians' notes reviewed. Labs and procedure reviewed.     Consults: None    Significant Diagnostic Studies:   CT abdomen:   1. Small volume of fluid within a relatively nondistended colon. An acute colitis is possible in the appropriate clinical setting.  2. Otherwise, no acute CT findings in the abdomen or pelvis to explain this  patient's symptoms.     CXR: No acute radiographic findings in the chest.    Microbiology Results (last 7 days)     Procedure Component Value Units Date/Time    Stool culture [[id number]] Collected:  04/18/17 0950    Order Status:  Completed Specimen:  Stool from Stool Updated:  04/19/17 1251     Stool Culture No growth    Clostridium difficile EIA [[id number]] Collected:  04/18/17 0950    Order Status:  Completed Specimen:  Stool from Stool Updated:  04/18/17 2206     C. diff Antigen Negative     C difficile Toxins A+B, EIA Negative      Testing not recommended for children <24 months old.       E. coli 0157 antigen [[id number]] Collected:  04/18/17 0950    Order Status:  No result Specimen:  Stool from Stool Updated:  04/18/17 1314        Special Treatments/Procedures: None  Disposition: Home or Self Care    Medications:  Reconciled Home Medications: Current Discharge Medication List      START taking these medications    Details   ciprofloxacin HCl (CIPRO) 500 MG tablet Take 1 tablet (500 mg total) by mouth every 12 (twelve) hours.  Qty: 14 tablet, Refills: 0      metronidazole (FLAGYL) 500 MG tablet Take 1 tablet (500 mg total) by mouth every 8 (eight) hours.  Qty: 21 tablet, Refills: 0         CONTINUE these medications which have NOT CHANGED    Details   dicyclomine (BENTYL) 10 MG capsule Take 10 mg by mouth 4 (four) times daily before meals and nightly.      fish oil-omega-3 fatty acids 300-1,000 mg capsule Take 1 g by mouth 2 (two) times daily.      valacyclovir (VALTREX) 500 MG tablet Take 500 mg by mouth 2 (two) times daily.      venlafaxine (EFFEXOR-XR) 150 MG Cp24 Take 75 mg by mouth once daily.      alprazolam (XANAX) 1 MG tablet Take 1 tablet (1 mg total) by mouth 2 (two) times daily as needed for Anxiety.  Qty: 12 tablet, Refills: 0             Discharge Procedure Orders  Diet general   Order Comments: Cardiac/ 2 gram sodium low cholesterol diet     Other restrictions (specify):   Order Comments: Fall  precautions     Call MD for:   Order Comments: For worsening symptoms, chest pain, shortness of breath, increased abdominal pain, high grade fever, stroke or stroke like symptoms, immediately go to the nearest Emergency Room or call 911 as soon as possible.       Follow-up Information     Follow up with PCP.    Contact information:    Follow up with a GI specialist at earliest convenience.    Keep yourself well hydrated; Avoid highly concentrated juices and milk products for few days.

## 2017-04-19 NOTE — PLAN OF CARE
Problem: Patient Care Overview  Goal: Plan of Care Review  Outcome: Ongoing (interventions implemented as appropriate)  Pt remains free from injury or falls. Vital signs stable throughout night on room air. Positions self independently. Pain managed with PO medications, no complaints of N/V, diarrhea x 2.  Bed in low locked position and call light within reach.  Will continue to monitor.

## 2017-04-19 NOTE — NURSING
Pt is tolerating her GI soft diet, ambulates to restroom, liquid BM x1 today, VSS and noted will cont to monitor

## 2017-04-19 NOTE — PLAN OF CARE
04/19/17 1629   Final Note   Assessment Type Final Discharge Note   Discharge Disposition Home   Discharge planning education complete? Yes

## 2017-04-19 NOTE — NURSING
Pt was discharge to home per Md orders, pt IV was removed pt tolerated well no hematoma or swelling noted, pt received discharge instructions, prescriptions and education handout, pt is aware of her follow up appt with PCP within 1week pt verb understanding, pt VSS and noted nad noted at the time of discharge

## 2017-04-20 LAB
E COLI SXT1 STL QL IA: NEGATIVE
E COLI SXT2 STL QL IA: NEGATIVE

## 2017-04-21 LAB
BACTERIA STL CULT: NORMAL

## 2018-01-09 ENCOUNTER — HOSPITAL ENCOUNTER (EMERGENCY)
Facility: HOSPITAL | Age: 33
Discharge: HOME | End: 2018-01-09
Attending: EMERGENCY MEDICINE
Payer: COMMERCIAL

## 2018-01-09 VITALS
OXYGEN SATURATION: 96 % | BODY MASS INDEX: 29.16 KG/M2 | HEIGHT: 65 IN | HEART RATE: 82 BPM | WEIGHT: 175 LBS | RESPIRATION RATE: 20 BRPM | DIASTOLIC BLOOD PRESSURE: 76 MMHG | SYSTOLIC BLOOD PRESSURE: 120 MMHG | TEMPERATURE: 99 F

## 2018-01-09 DIAGNOSIS — M25.572 LEFT ANKLE PAIN: ICD-10-CM
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
## 2018-01-09 LAB
B-HCG UR QL: NEGATIVE
CTP QC/QA: YES

## 2018-01-09 PROCEDURE — 99284 EMERGENCY DEPT VISIT MOD MDM: CPT | Mod: 25

## 2018-01-09 PROCEDURE — 81025 URINE PREGNANCY TEST: CPT | Performed by: PHYSICIAN ASSISTANT

## 2018-01-09 RX ORDER — NAPROXEN 500 MG/1
500 TABLET ORAL 2 TIMES DAILY WITH MEALS
Qty: 10 TABLET | Refills: 0 | Status: SHIPPED | OUTPATIENT
Start: 2018-01-09 | End: 2018-01-14

## 2018-01-09 RX ORDER — METHOCARBAMOL 500 MG/1
500 TABLET, FILM COATED ORAL 3 TIMES DAILY
Qty: 15 TABLET | Refills: 0 | Status: SHIPPED | OUTPATIENT
Start: 2018-01-09 | End: 2018-01-14

## 2018-01-09 NOTE — DISCHARGE INSTRUCTIONS
Take medications as prescribed.  Follow up with your primary care provider.  For worsening symptoms, chest pain, shortness of breath, increased abdominal pain, high grade fever, stroke or stroke like symptoms, immediately go to the nearest Emergency Room or call 911 as soon as possible.
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
## 2018-12-11 RX ORDER — ERGOCALCIFEROL 1.25 MG/1
50000 CAPSULE ORAL DAILY
Status: ON HOLD | COMMUNITY
End: 2020-08-17 | Stop reason: HOSPADM

## 2018-12-11 RX ORDER — MEDROXYPROGESTERONE ACETATE 150 MG/ML
150 INJECTION, SUSPENSION INTRAMUSCULAR
COMMUNITY
End: 2023-08-03 | Stop reason: SDUPTHER

## 2018-12-14 ENCOUNTER — ANESTHESIA EVENT (OUTPATIENT)
Dept: SURGERY | Facility: AMBULARY SURGERY CENTER | Age: 33
End: 2018-12-14
Payer: MEDICAID

## 2018-12-17 ENCOUNTER — HOSPITAL ENCOUNTER (OUTPATIENT)
Facility: AMBULARY SURGERY CENTER | Age: 33
Discharge: HOME | End: 2018-12-17
Attending: OBSTETRICS & GYNECOLOGY | Admitting: OBSTETRICS & GYNECOLOGY
Payer: MEDICAID

## 2018-12-17 ENCOUNTER — ANESTHESIA (OUTPATIENT)
Dept: SURGERY | Facility: AMBULARY SURGERY CENTER | Age: 33
End: 2018-12-17
Payer: MEDICAID

## 2018-12-17 DIAGNOSIS — N92.1 MENOMETRORRHAGIA: Primary | ICD-10-CM

## 2018-12-17 LAB
B-HCG UR QL: NEGATIVE
CTP QC/QA: YES

## 2018-12-17 PROCEDURE — 88305 TISSUE EXAM BY PATHOLOGIST: CPT | Performed by: PATHOLOGY

## 2018-12-17 PROCEDURE — 58558 HYSTEROSCOPY BIOPSY: CPT | Performed by: OBSTETRICS & GYNECOLOGY

## 2018-12-17 PROCEDURE — D9220A PRA ANESTHESIA: Mod: CRNA,,, | Performed by: NURSE ANESTHETIST, CERTIFIED REGISTERED

## 2018-12-17 PROCEDURE — D9220A PRA ANESTHESIA: Mod: ANES,,, | Performed by: ANESTHESIOLOGY

## 2018-12-17 PROCEDURE — 88305 TISSUE SPECIMEN TO PATHOLOGY - SURGERY: ICD-10-PCS | Mod: 26,,, | Performed by: PATHOLOGY

## 2018-12-17 RX ORDER — DEXAMETHASONE SODIUM PHOSPHATE 4 MG/ML
INJECTION, SOLUTION INTRA-ARTICULAR; INTRALESIONAL; INTRAMUSCULAR; INTRAVENOUS; SOFT TISSUE
Status: DISCONTINUED | OUTPATIENT
Start: 2018-12-17 | End: 2018-12-17

## 2018-12-17 RX ORDER — LIDOCAINE HYDROCHLORIDE 10 MG/ML
1 INJECTION, SOLUTION EPIDURAL; INFILTRATION; INTRACAUDAL; PERINEURAL ONCE
Status: COMPLETED | OUTPATIENT
Start: 2018-12-17 | End: 2018-12-17

## 2018-12-17 RX ORDER — SODIUM CHLORIDE 0.9 % (FLUSH) 0.9 %
3 SYRINGE (ML) INJECTION
Status: DISCONTINUED | OUTPATIENT
Start: 2018-12-17 | End: 2018-12-17 | Stop reason: HOSPADM

## 2018-12-17 RX ORDER — KETOROLAC TROMETHAMINE 30 MG/ML
INJECTION, SOLUTION INTRAMUSCULAR; INTRAVENOUS
Status: COMPLETED
Start: 2018-12-17 | End: 2018-12-17

## 2018-12-17 RX ORDER — PROPOFOL 10 MG/ML
VIAL (ML) INTRAVENOUS
Status: DISCONTINUED | OUTPATIENT
Start: 2018-12-17 | End: 2018-12-17

## 2018-12-17 RX ORDER — OXYCODONE AND ACETAMINOPHEN 5; 325 MG/1; MG/1
1 TABLET ORAL EVERY 4 HOURS PRN
Qty: 15 TABLET | Refills: 0 | Status: ON HOLD | OUTPATIENT
Start: 2018-12-17 | End: 2020-08-17 | Stop reason: HOSPADM

## 2018-12-17 RX ORDER — ACETAMINOPHEN 10 MG/ML
INJECTION, SOLUTION INTRAVENOUS
Status: DISCONTINUED | OUTPATIENT
Start: 2018-12-17 | End: 2018-12-17

## 2018-12-17 RX ORDER — LIDOCAINE HYDROCHLORIDE 20 MG/ML
INJECTION, SOLUTION EPIDURAL; INFILTRATION; INTRACAUDAL; PERINEURAL
Status: DISCONTINUED
Start: 2018-12-17 | End: 2018-12-17 | Stop reason: HOSPADM

## 2018-12-17 RX ORDER — METOCLOPRAMIDE HYDROCHLORIDE 5 MG/ML
10 INJECTION INTRAMUSCULAR; INTRAVENOUS EVERY 10 MIN PRN
Status: DISCONTINUED | OUTPATIENT
Start: 2018-12-17 | End: 2018-12-17 | Stop reason: HOSPADM

## 2018-12-17 RX ORDER — SODIUM CHLORIDE, SODIUM LACTATE, POTASSIUM CHLORIDE, CALCIUM CHLORIDE 600; 310; 30; 20 MG/100ML; MG/100ML; MG/100ML; MG/100ML
INJECTION, SOLUTION INTRAVENOUS CONTINUOUS
Status: DISCONTINUED | OUTPATIENT
Start: 2018-12-17 | End: 2018-12-17 | Stop reason: HOSPADM

## 2018-12-17 RX ORDER — PROPOFOL 10 MG/ML
INJECTION, EMULSION INTRAVENOUS
Status: COMPLETED
Start: 2018-12-17 | End: 2018-12-17

## 2018-12-17 RX ORDER — CETIRIZINE HYDROCHLORIDE 10 MG/1
10 TABLET ORAL DAILY
COMMUNITY
End: 2020-12-22 | Stop reason: CLARIF

## 2018-12-17 RX ORDER — OXYCODONE AND ACETAMINOPHEN 5; 325 MG/1; MG/1
1 TABLET ORAL
Status: DISCONTINUED | OUTPATIENT
Start: 2018-12-17 | End: 2018-12-17

## 2018-12-17 RX ORDER — ONDANSETRON 2 MG/ML
INJECTION INTRAMUSCULAR; INTRAVENOUS
Status: DISCONTINUED | OUTPATIENT
Start: 2018-12-17 | End: 2018-12-17

## 2018-12-17 RX ORDER — FENTANYL CITRATE 50 UG/ML
INJECTION, SOLUTION INTRAMUSCULAR; INTRAVENOUS
Status: COMPLETED
Start: 2018-12-17 | End: 2018-12-17

## 2018-12-17 RX ORDER — MIDAZOLAM HYDROCHLORIDE 1 MG/ML
INJECTION INTRAMUSCULAR; INTRAVENOUS
Status: COMPLETED
Start: 2018-12-17 | End: 2018-12-17

## 2018-12-17 RX ORDER — DEXAMETHASONE SODIUM PHOSPHATE 4 MG/ML
INJECTION, SOLUTION INTRA-ARTICULAR; INTRALESIONAL; INTRAMUSCULAR; INTRAVENOUS; SOFT TISSUE
Status: COMPLETED
Start: 2018-12-17 | End: 2018-12-17

## 2018-12-17 RX ORDER — FENTANYL CITRATE 50 UG/ML
25 INJECTION, SOLUTION INTRAMUSCULAR; INTRAVENOUS EVERY 5 MIN PRN
Status: DISCONTINUED | OUTPATIENT
Start: 2018-12-17 | End: 2018-12-17 | Stop reason: HOSPADM

## 2018-12-17 RX ORDER — OXYCODONE HYDROCHLORIDE 5 MG/1
5 TABLET ORAL ONCE
Status: COMPLETED | OUTPATIENT
Start: 2018-12-17 | End: 2018-12-17

## 2018-12-17 RX ORDER — ACETAMINOPHEN 10 MG/ML
INJECTION, SOLUTION INTRAVENOUS
Status: COMPLETED
Start: 2018-12-17 | End: 2018-12-17

## 2018-12-17 RX ORDER — KETOROLAC TROMETHAMINE 30 MG/ML
INJECTION, SOLUTION INTRAMUSCULAR; INTRAVENOUS
Status: DISCONTINUED | OUTPATIENT
Start: 2018-12-17 | End: 2018-12-17

## 2018-12-17 RX ORDER — FENTANYL CITRATE 50 UG/ML
INJECTION, SOLUTION INTRAMUSCULAR; INTRAVENOUS
Status: DISCONTINUED | OUTPATIENT
Start: 2018-12-17 | End: 2018-12-17

## 2018-12-17 RX ORDER — MIDAZOLAM HYDROCHLORIDE 1 MG/ML
INJECTION, SOLUTION INTRAMUSCULAR; INTRAVENOUS
Status: DISCONTINUED | OUTPATIENT
Start: 2018-12-17 | End: 2018-12-17

## 2018-12-17 RX ORDER — IBUPROFEN 800 MG/1
800 TABLET ORAL EVERY 6 HOURS PRN
Qty: 30 TABLET | Refills: 0 | Status: SHIPPED | OUTPATIENT
Start: 2018-12-17 | End: 2018-12-27

## 2018-12-17 RX ORDER — LIDOCAINE HCL/PF 100 MG/5ML
SYRINGE (ML) INTRAVENOUS
Status: DISCONTINUED | OUTPATIENT
Start: 2018-12-17 | End: 2018-12-17

## 2018-12-17 RX ORDER — SODIUM CHLORIDE 0.9 G/100ML
IRRIGANT IRRIGATION
Status: DISCONTINUED | OUTPATIENT
Start: 2018-12-17 | End: 2018-12-17 | Stop reason: HOSPADM

## 2018-12-17 RX ORDER — OXYCODONE HYDROCHLORIDE 5 MG/1
TABLET ORAL
Status: COMPLETED
Start: 2018-12-17 | End: 2018-12-17

## 2018-12-17 RX ORDER — ONDANSETRON 2 MG/ML
INJECTION INTRAMUSCULAR; INTRAVENOUS
Status: COMPLETED
Start: 2018-12-17 | End: 2018-12-17

## 2018-12-17 RX ADMIN — KETOROLAC TROMETHAMINE 30 MG: 30 INJECTION, SOLUTION INTRAMUSCULAR; INTRAVENOUS at 07:12

## 2018-12-17 RX ADMIN — OXYCODONE HYDROCHLORIDE 5 MG: 5 TABLET ORAL at 09:12

## 2018-12-17 RX ADMIN — MIDAZOLAM HYDROCHLORIDE 2 MG: 1 INJECTION, SOLUTION INTRAMUSCULAR; INTRAVENOUS at 07:12

## 2018-12-17 RX ADMIN — Medication 200 MG: at 07:12

## 2018-12-17 RX ADMIN — FENTANYL CITRATE 50 MCG: 50 INJECTION, SOLUTION INTRAMUSCULAR; INTRAVENOUS at 07:12

## 2018-12-17 RX ADMIN — SODIUM CHLORIDE, SODIUM LACTATE, POTASSIUM CHLORIDE, CALCIUM CHLORIDE: 600; 310; 30; 20 INJECTION, SOLUTION INTRAVENOUS at 07:12

## 2018-12-17 RX ADMIN — FENTANYL CITRATE 25 MCG: 50 INJECTION, SOLUTION INTRAMUSCULAR; INTRAVENOUS at 08:12

## 2018-12-17 RX ADMIN — DEXAMETHASONE SODIUM PHOSPHATE 8 MG: 4 INJECTION, SOLUTION INTRA-ARTICULAR; INTRALESIONAL; INTRAMUSCULAR; INTRAVENOUS; SOFT TISSUE at 07:12

## 2018-12-17 RX ADMIN — LIDOCAINE HYDROCHLORIDE 0.2 ML: 10 INJECTION, SOLUTION EPIDURAL; INFILTRATION; INTRACAUDAL; PERINEURAL at 07:12

## 2018-12-17 RX ADMIN — ONDANSETRON 4 MG: 2 INJECTION INTRAMUSCULAR; INTRAVENOUS at 07:12

## 2018-12-17 RX ADMIN — SODIUM CHLORIDE, SODIUM LACTATE, POTASSIUM CHLORIDE, CALCIUM CHLORIDE: 600; 310; 30; 20 INJECTION, SOLUTION INTRAVENOUS at 08:12

## 2018-12-17 RX ADMIN — Medication 100 MG: at 07:12

## 2018-12-17 RX ADMIN — ACETAMINOPHEN 1000 MG: 10 INJECTION, SOLUTION INTRAVENOUS at 07:12

## 2018-12-17 NOTE — BRIEF OP NOTE
OPERATIVE REPORT    PREOPERATIVE DIAGNOSIS:  Menometorrhagia    POSTOPERATIVE DIAGNOSIS:  Menometorrrrhagia    PROCEDURE:  D and C, hysteroscopy with myosure polypectomy 12/17/18     SURGEON:  Gloria Umaña MD    ANESTHESIA:  General    CLINICAL INDICATION:  The patient is well known to me and followed for a long time.  She has had endometrial problems and bleeding problems for some time.  Discussion was held in my office, and we decided to proceed with a D and C, hysteroscopy and endometrial ablation.    FINDINGS:  Large endometrial polyp with multiple endocervical polyps    SPECIMENS:  Endometrial tissue    PROCEDURE:  After appropriate consents were signed and the patient was made aware of all risks and benefits of the procedure, she was brought to the Operating Room and underwent general anesthesia.  After adequate establishment of anesthesia, the patient was placed in the dorsal lithotomy position, prepped and draped in a sterile fashion.    A weighted speculum was placed into the vagina and a right angle retractor was used to visualize the cervix and the anterior lip of the cervix was grasped with a single-tooth tenaculum.  The uterine cavity was sounded to 8cm.  Next, the cervix was then dilated to a #8 Hegar dilator. Next, the hysteroscopy was performed.  The hysteroscope was inserted in the usual fashion and the endometrial cavity was then inspected. The findings were large fundal endometrial polyp with multiple small polyps near endocervical canal.  The myosure apparatus was used to remove all polyp.  All tissue was sent to pathology. At this point, the myosure &  hysteroscope were removed.  At this point a gentle curetting was performed across the entire uterine cavity.  All tissue was then handed off to go to Pathology. All instruments were removed from the vagina.    The patient tolerated the procedure well.  The patient was taken to Recovery Room in stable condition.  All sponge, needle and lap  counts were correct.    COMPLICATIONS:  None

## 2018-12-17 NOTE — ANESTHESIA PREPROCEDURE EVALUATION
12/17/2018  Eva Escalera is a 33 y.o., female.    Anesthesia Evaluation    I have reviewed the Patient Summary Reports.    I have reviewed the Nursing Notes.   I have reviewed the Medications.     Review of Systems  Anesthesia Hx:  Denies Family Hx of Anesthesia complications.   Denies Personal Hx of Anesthesia complications.   Social:  Former Smoker    Pulmonary:   Recent URI, unresolved    Psych:   Psychiatric History          Physical Exam  General:  Well nourished    Airway/Jaw/Neck:  Airway Findings: Mouth Opening: Normal Tongue: Normal  General Airway Assessment: Adult  Mallampati: II  TM Distance: Normal, at least 6 cm         Dental:  DENTAL FINDINGS: Normal   Chest/Lungs:  Chest/Lungs Findings: Clear to auscultation, Normal Respiratory Rate     Heart/Vascular:  Heart Findings: Normal            Anesthesia Plan  Type of Anesthesia, risks & benefits discussed:  Anesthesia Type:  general  Patient's Preference:   Intra-op Monitoring Plan:   Intra-op Monitoring Plan Comments:   Post Op Pain Control Plan:   Post Op Pain Control Plan Comments:   Induction:   IV  Beta Blocker:  Patient is not currently on a Beta-Blocker (No further documentation required).       Informed Consent: Patient understands risks and agrees with Anesthesia plan.  Questions answered. Anesthesia consent signed with patient.  ASA Score: 2     Day of Surgery Review of History & Physical:    H&P update referred to the surgeon.         Ready For Surgery From Anesthesia Perspective.

## 2018-12-17 NOTE — ANESTHESIA POSTPROCEDURE EVALUATION
"Anesthesia Post Evaluation    Patient: Eva Escalera    Procedure(s) Performed: Procedure(s) (LRB):  HYSTEROSCOPY, WITH DILATION AND CURETTAGE OF UTERUS (N/A)  MYOMECTOMY (N/A)    Final Anesthesia Type: general  Patient location during evaluation: PACU  Patient participation: Yes- Able to Participate  Level of consciousness: awake and alert  Post-procedure vital signs: reviewed and stable  Pain management: adequate  Airway patency: patent  PONV status at discharge: No PONV  Anesthetic complications: no      Cardiovascular status: blood pressure returned to baseline  Respiratory status: unassisted  Hydration status: euvolemic  Follow-up not needed.        Visit Vitals  /70   Pulse 77   Temp 36.6 °C (97.9 °F) (Skin)   Resp 16   Ht 5' 5" (1.651 m)   Wt 80.7 kg (178 lb)   LMP 11/17/2018   SpO2 100%   Breastfeeding? No   BMI 29.62 kg/m²       Pain/Zahraa Score: No Data Recorded      "

## 2018-12-17 NOTE — DISCHARGE INSTRUCTIONS
Anesthesia: After Your Surgery  Youve just had surgery. During surgery, you received medication called anesthesia to keep you comfortable and pain-free. After surgery, you may experience some pain or nausea. This is normal. Here are some tips for feeling better and recovering after surgery.         Stay on schedule with your medication.   Going Home  Your doctor or nurse will show you how to take care of yourself when you go home. He or she will also answer your questions. Have an adult family member or friend drive you home. For the first 24 hours after your surgery:  · Do not drive or use heavy equipment.  · Do not make important decisions or sign documents.  · Avoid alcohol.  · Have someone stay with you, if possible. They can watch for problems and help keep you safe.  Be sure to keep all follow-up doctors appointments. And rest after your procedure for as long as your doctor tells you to.  Coping with Pain  If you have pain after surgery, pain medication will help you feel better. Take it as directed, before pain becomes severe. Also, ask your doctor or pharmacist about other ways to control pain, such as with heat, ice, and relaxation. And follow any other instructions your surgeon or nurse gives you.  Tips for Taking Pain Medication  To get the best relief possible, remember these points:  · Pain medications can upset your stomach. Taking them with a little food may help.  · Most pain relievers taken by mouth need at least 20 to 30 minutes to take effect.  · Taking medication on a schedule can help you remember to take it. Try to time your medication so that you can take it before beginning an activity, such as dressing, walking, or sitting down for dinner.  · Constipation is a common side effect of pain medications. Drink lots of fluids. Eating fruit and vegetables can also help. Dont take laxatives unless your surgeon has prescribed them.  · Mixing alcohol and pain medication can cause dizziness and slow  your breathing. It can even be fatal. Dont drink alcohol while taking pain medication.  · Pain medication can slow your reflexes. Dont drive or operate machinery while taking pain medication.  Managing Nausea  Some people have an upset stomach after surgery. This is often due to anesthesia, pain, pain medications, or the stress of surgery. The following tips will help you manage nausea and get good nutrition as you recover. If you were on a special diet before surgery, ask your doctor if you should follow it during recovery. These tips may help:  · Dont push yourself to eat. Your body will tell you what to eat and when.  · Start off with liquids and soup. They are easier to digest.  · Progress to semisolids (mashed potatoes, applesauce, and gelatin) as you feel ready.  · Slowly move to solid foods. Dont eat fatty, rich, or spicy foods at first.  · Dont force yourself to have three large meals a day. Instead, eat smaller amounts more often.  · Take pain medications with a small amount of solid food, such as crackers or toast.  Call Your Surgeon If  · You still have pain an hour after taking medication (it may not be strong enough).  · You feel too sleepy, dizzy, or groggy (medication may be too strong).  · You have side effects like nausea, vomiting, or skin changes (rash, itching, or hives).      Discharge Instructions for Dilation and Curettage (D and C)  Your doctor performed dilation and curettage (D&C). The reasons for having this procedure vary from person to person. The D&C may be done to control heavy uterine bleeding, to find the cause of irregular bleeding, to perform an , or to remove pregnancy tissue if you have had a miscarriage.  Home care  · Take it easy. Rest for 2 days as needed.  · Return to your normal activities after 24 to 48 hours. You may also return to work at that time.  · Eat a normal diet.  · Take an over-the-counter pain reliever for pain, if needed.  · Remember, its OK to  have bleeding for about a week after the procedure. The amount of bleeding should be similar to what you have during a normal period.  · Dont drive for 24 hours after the procedure unless specifically told by your provider that it is OK to do so.  · Dont have sexual intercourse or use tampons or douches until your doctor says its safe to do so.  Follow-up  · Make a follow-up appointment as directed by our staff.     When to call your doctor  Call your doctor right away if you have any of the following:  · Bleeding that soaks more than one sanitary pad in one hour  · Severe abdominal pain  · Severe cramps  · Fever above 100.4°F (38.0°C)  · A foul smelling vaginal discharge   Date Last Reviewed: 5/19/2015  © 5510-7231 [employer]. [address] [place] [address], [place] [address]. All rights reserved. This information is not intended as a substitute for professional medical care. Always follow your healthcare professional's instructions.

## 2018-12-17 NOTE — DISCHARGE SUMMARY
[facility]  Discharge Note  Short Stay    Admit Date: 12/17/2018    Discharge Date and Time: 12/17/2018    Attending Physician: Gloria Umaña MD     Discharge Provider: Gloria Umaña    Diagnoses:  Active Hospital Problems    Diagnosis  POA    Menometrorrhagia [N92.1]  Yes      Resolved Hospital Problems   No resolved problems to display.       Discharged Condition: good    Hospital Course: Patient was admitted for an outpatient procedure and tolerated the procedure well with no complications.    Final Diagnoses: Same as principal problem.    Disposition: Home or Self Care    Follow up/Patient Instructions:    Medications:  Reconciled Home Medications:      Medication List      START taking these medications    ibuprofen 800 MG tablet  Commonly known as:  ADVIL,MOTRIN  Take 1 tablet (800 mg total) by mouth every 6 (six) hours as needed for Pain.     oxyCODONE-acetaminophen 5-325 mg per tablet  Commonly known as:  PERCOCET  Take 1 tablet by mouth every 4 (four) hours as needed for Pain.        CONTINUE taking these medications    ALPRAZolam 1 MG tablet  Commonly known as:  XANAX  Take 1 tablet (1 mg total) by mouth 2 (two) times daily as needed for Anxiety.     cetirizine 10 MG tablet  Commonly known as:  ZYRTEC  Take 10 mg by mouth once daily.     dicyclomine 10 MG capsule  Commonly known as:  BENTYL  Take 10 mg by mouth 4 (four) times daily before meals and nightly.     fish oil-omega-3 fatty acids 300-1,000 mg capsule  Take 1 g by mouth 2 (two) times daily.     medroxyPROGESTERone 150 mg/mL injection  Commonly known as:  DEPO-PROVERA  Inject 150 mg into the muscle every 3 (three) months.     valACYclovir 500 MG tablet  Commonly known as:  VALTREX  Take 500 mg by mouth 2 (two) times daily as needed.     VITAMIN D2 50,000 unit Cap  Generic drug:  ergocalciferol  Take 50,000 Units by mouth once daily.          Discharge Procedure Orders   Diet Adult Regular     Notify your health care provider  if you experience any of the following:  temperature >100.4     Notify your health care provider if you experience any of the following:  persistent nausea and vomiting or diarrhea     Notify your health care provider if you experience any of the following:  severe uncontrolled pain     Notify your health care provider if you experience any of the following:  redness, tenderness, or signs of infection (pain, swelling, redness, odor or green/yellow discharge around incision site)     Notify your health care provider if you experience any of the following:  difficulty breathing or increased cough     Notify your health care provider if you experience any of the following:  severe persistent headache     Notify your health care provider if you experience any of the following:  worsening rash     Notify your health care provider if you experience any of the following:  persistent dizziness, light-headedness, or visual disturbances     Notify your health care provider if you experience any of the following:  increased confusion or weakness     Notify your health care provider if you experience any of the following:     Activity as tolerated     Follow-up Information     Gloria Umaña MD.    Specialty:  Obstetrics and Gynecology  Contact information:  [address] [id number] [phone]                   Discharge Procedure Orders (must include Diet, Follow-up, Activity):   Discharge Procedure Orders (must include Diet, Follow-up, Activity)   Diet Adult Regular     Notify your health care provider if you experience any of the following:  temperature >100.4     Notify your health care provider if you experience any of the following:  persistent nausea and vomiting or diarrhea     Notify your health care provider if you experience any of the following:  severe uncontrolled pain     Notify your health care provider if you experience any of the following:  redness, tenderness, or signs of infection  (pain, swelling, redness, odor or green/yellow discharge around incision site)     Notify your health care provider if you experience any of the following:  difficulty breathing or increased cough     Notify your health care provider if you experience any of the following:  severe persistent headache     Notify your health care provider if you experience any of the following:  worsening rash     Notify your health care provider if you experience any of the following:  persistent dizziness, light-headedness, or visual disturbances     Notify your health care provider if you experience any of the following:  increased confusion or weakness     Notify your health care provider if you experience any of the following:     Activity as tolerated

## 2018-12-17 NOTE — TRANSFER OF CARE
"Anesthesia Transfer of Care Note    Patient: Eva Escalera    Procedure(s) Performed: Procedure(s) (LRB):  HYSTEROSCOPY, WITH DILATION AND CURETTAGE OF UTERUS (N/A)  MYOMECTOMY (N/A)    Patient location: PACU    Anesthesia Type: general    Transport from OR: Transported from OR on 2-3 L/min O2 by NC with adequate spontaneous ventilation    Post pain: adequate analgesia    Post assessment: no apparent anesthetic complications    Post vital signs: stable    Level of consciousness: sedated    Nausea/Vomiting: no nausea/vomiting    Complications: none    Transfer of care protocol was followed      Last vitals:   Visit Vitals  /64   Pulse 90   Temp 36.6 °C (97.9 °F)   Resp 18   Ht 5' 5" (1.651 m)   Wt 80.7 kg (178 lb)   LMP 11/17/2018   SpO2 99%   Breastfeeding? No   BMI 29.62 kg/m²     "

## 2018-12-18 VITALS
HEART RATE: 78 BPM | RESPIRATION RATE: 18 BRPM | WEIGHT: 178 LBS | TEMPERATURE: 98 F | BODY MASS INDEX: 29.66 KG/M2 | OXYGEN SATURATION: 98 % | SYSTOLIC BLOOD PRESSURE: 133 MMHG | HEIGHT: 65 IN | DIASTOLIC BLOOD PRESSURE: 87 MMHG

## 2019-01-05 ENCOUNTER — HOSPITAL ENCOUNTER (EMERGENCY)
Facility: HOSPITAL | Age: 34
Discharge: HOME | End: 2019-01-05
Attending: EMERGENCY MEDICINE
Payer: MEDICAID

## 2019-01-05 VITALS
HEART RATE: 98 BPM | WEIGHT: 176 LBS | HEIGHT: 66 IN | OXYGEN SATURATION: 100 % | SYSTOLIC BLOOD PRESSURE: 137 MMHG | BODY MASS INDEX: 28.28 KG/M2 | DIASTOLIC BLOOD PRESSURE: 95 MMHG | RESPIRATION RATE: 18 BRPM | TEMPERATURE: 100 F

## 2019-01-05 DIAGNOSIS — J06.9 VIRAL URI WITH COUGH: Primary | ICD-10-CM

## 2019-01-05 DIAGNOSIS — R05.9 COUGH: ICD-10-CM

## 2019-01-05 PROCEDURE — 25000003 PHARM REV CODE 250: Performed by: NURSE PRACTITIONER

## 2019-01-05 PROCEDURE — 99283 EMERGENCY DEPT VISIT LOW MDM: CPT | Mod: 25

## 2019-01-05 RX ORDER — BENZONATATE 100 MG/1
100 CAPSULE ORAL ONCE
Status: COMPLETED | OUTPATIENT
Start: 2019-01-05 | End: 2019-01-05

## 2019-01-05 RX ORDER — BENZONATATE 100 MG/1
100 CAPSULE ORAL 3 TIMES DAILY PRN
Qty: 20 CAPSULE | Refills: 0 | Status: SHIPPED | OUTPATIENT
Start: 2019-01-05 | End: 2019-01-15

## 2019-01-05 RX ADMIN — BENZONATATE 100 MG: 100 CAPSULE ORAL at 04:01

## 2019-01-05 NOTE — ED PROVIDER NOTES
"Encounter Date: 2019    SCRIBE #1 NOTE: Fely HELLER , am scribing for, and in the presence of, Juana Mata NP .       History     Chief Complaint   Patient presents with    URI     Sx started 18.        Time seen by provider: 4:07 PM on 2019    Eva Escalera is a 33 y.o. female with a hx of IBS, herpes, and menometrorrhagia who presents to the ED with c/o cough, nasal congestion, runny nose x 2.5 weeks. Associated sx are fever, chills, sore throat, runny eyes, post nasal drip and HA. Pt notes she did not check her temperature but felt febrile. She describes the sore throat as a "pokey" sensation. Pt has taken multiple OTC medications for the sx including Ibuprofen, Theraflu, Zyrtec, Ekaterina Isabella, and Tylenol cold and flu. The sx resolved 3 days ago and returned yesterday associated with chest congestion. She is 2.5 weeks s/p menometrorrhagia (18) and notes the sx started before the procedure. She is on Ciprofloxacin and Flagyl for colitis. Pt notes she has been nauseous which she contributed to postnasal drip and abx. She has a hx of tobacco use (7 cigarettes q.d.) but has not been smoking due to the sx. Pt denies ear pain.       The history is provided by the patient.     Review of patient's allergies indicates:  No Known Allergies  Past Medical History:   Diagnosis Date    Anxiety     Depression     Herpes     IBS (irritable bowel syndrome)     Menometrorrhagia      Past Surgical History:   Procedure Laterality Date     SECTION      HYSTEROSCOPY, WITH DILATION AND CURETTAGE OF UTERUS N/A 2018    Performed by Gloria [facility]maña MD at [facility]    LASER ABLATION OF THE CERVIX      LIPOMA RESECTION      MYOMECTOMY N/A 2018    Performed by Gloria Umaña MD at [facility]     History reviewed. No pertinent family history.  Social History     Tobacco Use    Smoking status: Former Smoker     Packs/day: 1.00     Types: Cigarettes     Last attempt to " quit: 2014     Years since quittin.4   Substance Use Topics    Alcohol use: Yes     Comment: socially    Drug use: Not on file     Review of Systems   Constitutional: Positive for chills and fever (subjective ). Negative for activity change and appetite change.   HENT: Positive for congestion (chest and nasal), postnasal drip, rhinorrhea, sinus pressure, sneezing and sore throat. Negative for ear pain, sinus pain, trouble swallowing and voice change.    Eyes: Positive for discharge. Negative for pain and redness.   Respiratory: Positive for cough. Negative for shortness of breath, wheezing and stridor.    Cardiovascular: Negative for chest pain.   Gastrointestinal: Positive for nausea. Negative for abdominal distention, abdominal pain, blood in stool, constipation, diarrhea and vomiting.   Genitourinary: Negative for difficulty urinating, dysuria, flank pain, frequency, hematuria and urgency.   Musculoskeletal: Negative for arthralgias, gait problem, joint swelling and myalgias.   Skin: Negative for rash and wound.   Neurological: Positive for headaches. Negative for dizziness, syncope, facial asymmetry, speech difficulty, weakness, light-headedness and numbness.   Hematological: Negative for adenopathy.   Psychiatric/Behavioral: Negative.        Physical Exam     Initial Vitals [19 1552]   BP Pulse Resp Temp SpO2   (!) 137/95 98 18 99.6 °F (37.6 °C) 100 %      MAP       --         Physical Exam    Nursing note and vitals reviewed.  Constitutional: She appears well-developed and well-nourished. She is active.   HENT:   Head: Normocephalic and atraumatic.   Right Ear: Tympanic membrane, external ear and ear canal normal.   Left Ear: Tympanic membrane, external ear and ear canal normal.   Nose: Nose normal.   Mouth/Throat: Posterior oropharyngeal erythema (Mild) present. No oropharyngeal exudate.   No tonsillar swelling or exudate.    Eyes: Conjunctivae, EOM and lids are normal. Pupils are equal,  round, and reactive to light. Right eye exhibits no chemosis and no discharge. Left eye exhibits no chemosis and no discharge. Right conjunctiva is not injected. Left conjunctiva is not injected.   Teary eyes.    Neck: Trachea normal and normal range of motion. Neck supple. No stridor present. No tracheal deviation present. No neck rigidity.   Cardiovascular: Normal rate, regular rhythm and normal heart sounds. Exam reveals no distant heart sounds and no friction rub.    No murmur heard.  Pulmonary/Chest: Breath sounds normal. No stridor. She has no wheezes. She has no rhonchi. She has no rales.   Abdominal: Soft. Normal appearance and bowel sounds are normal. She exhibits no distension. There is no tenderness. There is no rigidity, no rebound, no guarding and no CVA tenderness.   Musculoskeletal: Normal range of motion.   Lymphadenopathy:        Head (right side): No submental, no submandibular, no preauricular and no posterior auricular adenopathy present.        Head (left side): No submental, no submandibular, no preauricular and no posterior auricular adenopathy present.     She has no cervical adenopathy.   Neurological: She is alert and oriented to person, place, and time. She has normal strength. No cranial nerve deficit or sensory deficit. Gait normal. GCS score is 15. GCS eye subscore is 4. GCS verbal subscore is 5. GCS motor subscore is 6.   Skin: Skin is warm, dry and intact. Capillary refill takes less than 2 seconds. No rash noted.   Psychiatric: She has a normal mood and affect. Her speech is normal and behavior is normal. Thought content normal.         ED Course   Procedures  Labs Reviewed - No data to display       Imaging Results          X-Ray Chest PA And Lateral (Final result)  Result time 01/05/19 16:51:53    Final result by Pawel Thomas MD (01/05/19 16:51:53)                 Narrative:    EXAMINATION:  XR CHEST PA AND LATERAL    CLINICAL HISTORY:  Cough    TECHNIQUE:  PA and lateral  views of the chest were performed.    COMPARISON:  04/17/2017    FINDINGS:  Lungs are clear.Normal cardiomediastinal silhouette.Normal pulmonary vascular distribution.No pleural effusion or pneumothorax.No acute osseous abnormality.      Electronically signed by: Pawel Thomas  Date:    01/05/2019  Time:    16:51                               Medical Decision Making:   History:   Old Medical Records: I decided to obtain old medical records.  Differential Diagnosis:   Influenza  Pneumonia  Strep pharyngitis  Meningitis  Viral syndrome  Clinical Tests:   Radiological Study: Ordered and Reviewed       APC / Resident Notes:   34 yo well appearing female presents with c/o sinus and cough. Based on history and physical exam the patient appears to have a viral upper respiratory infection.  Based upon the history and physical exam the patient does not appear to have a serious bacterial infection such as pneumonia, sepsis, otitis media, bacterial sinusitis, strep pharyngitis, parapharyngeal or peritonsillar abscess, meningitis.  Chest xray showed no infiltrates or effusion. Patient appears very well and I have given specific return precautions to the patient.  The patient can take over the counter medications and does not appear to need antibiotics at this time. I have discussed pt with Dr Cárdenas who evaluated pt and agrees with POC. Pt voices understanding and is agreeable to the plan.  She is given specific return precautions.         Scribe Attestation:   Scribe #1: I performed the above scribed service and the documentation accurately describes the services I performed. I attest to the accuracy of the note.    I, LIAT RodriguezC, personally performed the services described in this documentation. All medical record entries made by the scribe were at my direction and in my presence.  I have reviewed the chart and agree that the record reflects my personal performance and is accurate and complete. Juana Mata  NP-C.  5:56 PM 01/05/2019        ED Course as of Jan 05 1756   Sat Jan 05, 2019   1634 CXR:  Viral pattern with peribronchial cuffing noted, no focal pna. (my read)  [MR]      ED Course User Index  [MR] Pawel Cárdenas MD     Clinical Impression:     1. Viral URI with cough    2. Cough          Disposition:   Disposition: Discharged  Condition: Stable                        Juana Mata NP  01/05/19 [id number]

## 2019-01-05 NOTE — ED NOTES
Given written and verbal DC instructions questions answered per MD aware to follow up with PCP encouraged to return if needed. Given RX with teaching.

## 2019-01-05 NOTE — ED NOTES
"C/o runny nose, cough, congestion. Fever, sore throat, chills with "pokey" feeling all over, and "runny" eyes x 1.5 weeks. " I feel terrible" even non labored respirations. Aware to notify nurse of needs or concerns.   "

## 2019-01-22 PROBLEM — R19.7 DIARRHEA: Status: ACTIVE | Noted: 2019-01-22
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
## 2019-08-19 ENCOUNTER — CLINICAL SUPPORT (OUTPATIENT)
Dept: URGENT CARE | Facility: CLINIC | Age: 34
End: 2019-08-19
Payer: MEDICAID

## 2019-08-19 VITALS
OXYGEN SATURATION: 97 % | WEIGHT: 174 LBS | BODY MASS INDEX: 28.99 KG/M2 | DIASTOLIC BLOOD PRESSURE: 90 MMHG | TEMPERATURE: 98 F | HEART RATE: 90 BPM | HEIGHT: 65 IN | RESPIRATION RATE: 16 BRPM | SYSTOLIC BLOOD PRESSURE: 134 MMHG

## 2019-08-19 DIAGNOSIS — H66.92 LEFT OTITIS MEDIA, UNSPECIFIED OTITIS MEDIA TYPE: Primary | ICD-10-CM

## 2019-08-19 LAB
B-HCG UR QL: NEGATIVE
CTP QC/QA: YES

## 2019-08-19 PROCEDURE — 81025 URINE PREGNANCY TEST: CPT | Mod: S$GLB,,, | Performed by: NURSE PRACTITIONER

## 2019-08-19 PROCEDURE — 99214 PR OFFICE/OUTPT VISIT, EST, LEVL IV, 30-39 MIN: ICD-10-PCS | Mod: 25,S$GLB,, | Performed by: NURSE PRACTITIONER

## 2019-08-19 PROCEDURE — 99214 OFFICE O/P EST MOD 30 MIN: CPT | Mod: 25,S$GLB,, | Performed by: NURSE PRACTITIONER

## 2019-08-19 PROCEDURE — 81025 POCT URINE PREGNANCY: ICD-10-PCS | Mod: S$GLB,,, | Performed by: NURSE PRACTITIONER

## 2019-08-19 RX ORDER — DEXAMETHASONE SODIUM PHOSPHATE 4 MG/ML
8 INJECTION, SOLUTION INTRA-ARTICULAR; INTRALESIONAL; INTRAMUSCULAR; INTRAVENOUS; SOFT TISSUE
Status: COMPLETED | OUTPATIENT
Start: 2019-08-19 | End: 2019-08-19

## 2019-08-19 RX ORDER — AMOXICILLIN AND CLAVULANATE POTASSIUM 875; 125 MG/1; MG/1
1 TABLET, FILM COATED ORAL 2 TIMES DAILY
Qty: 20 TABLET | Refills: 0 | Status: SHIPPED | OUTPATIENT
Start: 2019-08-19 | End: 2019-08-29

## 2019-08-19 RX ORDER — IBUPROFEN 800 MG/1
800 TABLET ORAL EVERY 8 HOURS PRN
Qty: 60 TABLET | Refills: 2 | Status: SHIPPED | OUTPATIENT
Start: 2019-08-19 | End: 2019-09-18

## 2019-08-19 RX ORDER — IBUPROFEN 200 MG
800 TABLET ORAL
Status: COMPLETED | OUTPATIENT
Start: 2019-08-19 | End: 2019-08-19

## 2019-08-19 RX ADMIN — DEXAMETHASONE SODIUM PHOSPHATE 8 MG: 4 INJECTION, SOLUTION INTRA-ARTICULAR; INTRALESIONAL; INTRAMUSCULAR; INTRAVENOUS; SOFT TISSUE at 02:08

## 2019-08-19 RX ADMIN — Medication 800 MG: at 02:08

## 2019-08-19 NOTE — PROGRESS NOTES
"Subjective:       Patient ID: Eva Escalera is a 33 y.o. female.    Vitals:  height is 5' 5" (1.651 m) and weight is 78.9 kg (174 lb). Her temperature is 98 °F (36.7 °C). Her blood pressure is 134/90 (abnormal) and her pulse is 90. Her respiration is 16 and oxygen saturation is 97%.     Chief Complaint: Otalgia    Pt presents with left ear pain x 5 days. Pt states ear pain is gradually worsening. She describes it as throbbing quality, constant timing, worsens when laying down. Pt denies f/c/n/v.     Otalgia    Pertinent negatives include no coughing, rash, sore throat or vomiting.       Constitution: Negative for chills, sweating, fatigue and fever.   HENT: Positive for ear pain. Negative for congestion, sinus pain, sinus pressure, sore throat and voice change.    Neck: Negative for painful lymph nodes.   Eyes: Negative for eye redness.   Respiratory: Negative for chest tightness, cough, sputum production, bloody sputum, COPD, shortness of breath, stridor, wheezing and asthma.    Gastrointestinal: Negative for nausea and vomiting.   Musculoskeletal: Negative for muscle ache.   Skin: Negative for rash.   Allergic/Immunologic: Negative for seasonal allergies and asthma.   Hematologic/Lymphatic: Negative for swollen lymph nodes.       Objective:      Physical Exam   Constitutional: She is oriented to person, place, and time. She appears well-developed and well-nourished. She is cooperative.  Non-toxic appearance. She does not appear ill. No distress.   HENT:   Head: Normocephalic and atraumatic.   Right Ear: Hearing, tympanic membrane, external ear and ear canal normal.   Left Ear: Hearing, external ear and ear canal normal. Tympanic membrane is erythematous.   Nose: Nose normal. No mucosal edema, rhinorrhea or nasal deformity. No epistaxis. Right sinus exhibits no maxillary sinus tenderness and no frontal sinus tenderness. Left sinus exhibits no maxillary sinus tenderness and no frontal sinus tenderness. "   Mouth/Throat: Uvula is midline, oropharynx is clear and moist and mucous membranes are normal. No trismus in the jaw. Normal dentition. No uvula swelling. No posterior oropharyngeal erythema.   Eyes: Conjunctivae and lids are normal. No scleral icterus.   Sclera clear bilat   Neck: Trachea normal, full passive range of motion without pain and phonation normal. Neck supple.   Cardiovascular: Normal rate, regular rhythm, normal heart sounds, intact distal pulses and normal pulses.   Pulmonary/Chest: Effort normal and breath sounds normal. No respiratory distress.   Abdominal: Soft. Normal appearance and bowel sounds are normal. She exhibits no distension. There is no tenderness.   Musculoskeletal: Normal range of motion. She exhibits no edema or deformity.   Neurological: She is alert and oriented to person, place, and time. She exhibits normal muscle tone. Coordination normal.   Skin: Skin is warm, dry and intact. She is not diaphoretic. No pallor.   Psychiatric: She has a normal mood and affect. Her speech is normal and behavior is normal. Judgment and thought content normal. Cognition and memory are normal.   Nursing note and vitals reviewed.      Assessment:       1. Left otitis media, unspecified otitis media type        Plan:         Left otitis media, unspecified otitis media type  -     POCT urine pregnancy    Other orders  -     dexamethasone injection 8 mg  -     amoxicillin-clavulanate 875-125mg (AUGMENTIN) 875-125 mg per tablet; Take 1 tablet by mouth 2 (two) times daily. for 10 days  Dispense: 20 tablet; Refill: 0  -     ibuprofen (ADVIL,MOTRIN) 800 MG tablet; Take 1 tablet (800 mg total) by mouth every 8 (eight) hours as needed for Pain.  Dispense: 60 tablet; Refill: 2  -     ibuprofen tablet 800 mg

## 2020-07-10 DIAGNOSIS — R10.13 EPIGASTRIC PAIN: ICD-10-CM

## 2020-07-10 DIAGNOSIS — K59.04 CHRONIC IDIOPATHIC CONSTIPATION: Primary | ICD-10-CM

## 2020-07-10 DIAGNOSIS — R14.0 BLOATING: ICD-10-CM

## 2020-07-15 ENCOUNTER — HOSPITAL ENCOUNTER (OUTPATIENT)
Dept: RADIOLOGY | Facility: HOSPITAL | Age: 35
Discharge: HOME | End: 2020-07-15
Attending: SPECIALIST
Payer: MEDICAID

## 2020-07-15 DIAGNOSIS — K59.04 CHRONIC IDIOPATHIC CONSTIPATION: ICD-10-CM

## 2020-07-15 DIAGNOSIS — R14.0 BLOATING: ICD-10-CM

## 2020-07-15 DIAGNOSIS — R10.13 EPIGASTRIC PAIN: ICD-10-CM

## 2020-07-15 PROCEDURE — 76700 US EXAM ABDOM COMPLETE: CPT | Mod: TC,PO

## 2020-07-16 DIAGNOSIS — M25.562 LEFT KNEE PAIN: Primary | ICD-10-CM

## 2020-07-21 ENCOUNTER — HOSPITAL ENCOUNTER (OUTPATIENT)
Dept: RADIOLOGY | Facility: HOSPITAL | Age: 35
Discharge: HOME | End: 2020-07-21
Attending: NURSE PRACTITIONER
Payer: MEDICAID

## 2020-07-21 DIAGNOSIS — M25.562 LEFT KNEE PAIN: ICD-10-CM

## 2020-07-21 PROCEDURE — 73560 X-RAY EXAM OF KNEE 1 OR 2: CPT | Mod: TC,PO,LT

## 2020-07-22 ENCOUNTER — HOSPITAL ENCOUNTER (EMERGENCY)
Facility: HOSPITAL | Age: 35
Discharge: HOME | End: 2020-07-22
Attending: EMERGENCY MEDICINE
Payer: MEDICAID

## 2020-07-22 VITALS
SYSTOLIC BLOOD PRESSURE: 120 MMHG | TEMPERATURE: 99 F | HEART RATE: 81 BPM | OXYGEN SATURATION: 98 % | RESPIRATION RATE: 16 BRPM | WEIGHT: 174.19 LBS | DIASTOLIC BLOOD PRESSURE: 83 MMHG | HEIGHT: 65 IN | BODY MASS INDEX: 29.02 KG/M2

## 2020-07-22 DIAGNOSIS — R10.13 EPIGASTRIC ABDOMINAL PAIN: Primary | ICD-10-CM

## 2020-07-22 LAB
ALBUMIN SERPL BCP-MCNC: 4 G/DL (ref 3.5–5.2)
ALP SERPL-CCNC: 88 U/L (ref 55–135)
ALT SERPL W/O P-5'-P-CCNC: 16 U/L (ref 10–44)
ANION GAP SERPL CALC-SCNC: 10 MMOL/L (ref 8–16)
AST SERPL-CCNC: 12 U/L (ref 10–40)
B-HCG UR QL: NEGATIVE
BACTERIA #/AREA URNS HPF: NORMAL /HPF
BASOPHILS # BLD AUTO: 0.02 K/UL (ref 0–0.2)
BASOPHILS NFR BLD: 0.1 % (ref 0–1.9)
BILIRUB SERPL-MCNC: 0.3 MG/DL (ref 0.1–1)
BILIRUB UR QL STRIP: NEGATIVE
BUN SERPL-MCNC: 10 MG/DL (ref 6–20)
CALCIUM SERPL-MCNC: 9.2 MG/DL (ref 8.7–10.5)
CHLORIDE SERPL-SCNC: 105 MMOL/L (ref 95–110)
CLARITY UR: CLEAR
CO2 SERPL-SCNC: 23 MMOL/L (ref 23–29)
COLOR UR: YELLOW
CREAT SERPL-MCNC: 0.9 MG/DL (ref 0.5–1.4)
CTP QC/QA: YES
DIFFERENTIAL METHOD: ABNORMAL
EOSINOPHIL # BLD AUTO: 0 K/UL (ref 0–0.5)
EOSINOPHIL NFR BLD: 0.1 % (ref 0–8)
ERYTHROCYTE [DISTWIDTH] IN BLOOD BY AUTOMATED COUNT: 13.1 % (ref 11.5–14.5)
EST. GFR  (AFRICAN AMERICAN): >60 ML/MIN/1.73 M^2
EST. GFR  (NON AFRICAN AMERICAN): >60 ML/MIN/1.73 M^2
GLUCOSE SERPL-MCNC: 103 MG/DL (ref 70–110)
GLUCOSE UR QL STRIP: NEGATIVE
HCT VFR BLD AUTO: 45.8 % (ref 37–48.5)
HGB BLD-MCNC: 15.2 G/DL (ref 12–16)
HGB UR QL STRIP: NEGATIVE
HYALINE CASTS #/AREA URNS LPF: 0 /LPF
IMM GRANULOCYTES # BLD AUTO: 0.07 K/UL (ref 0–0.04)
IMM GRANULOCYTES NFR BLD AUTO: 0.5 % (ref 0–0.5)
KETONES UR QL STRIP: NEGATIVE
LEUKOCYTE ESTERASE UR QL STRIP: NEGATIVE
LIPASE SERPL-CCNC: 13 U/L (ref 4–60)
LYMPHOCYTES # BLD AUTO: 3.2 K/UL (ref 1–4.8)
LYMPHOCYTES NFR BLD: 23.3 % (ref 18–48)
MCH RBC QN AUTO: 30.3 PG (ref 27–31)
MCHC RBC AUTO-ENTMCNC: 33.2 G/DL (ref 32–36)
MCV RBC AUTO: 91 FL (ref 82–98)
MICROSCOPIC COMMENT: NORMAL
MONOCYTES # BLD AUTO: 1 K/UL (ref 0.3–1)
MONOCYTES NFR BLD: 7.2 % (ref 4–15)
NEUTROPHILS # BLD AUTO: 9.6 K/UL (ref 1.8–7.7)
NEUTROPHILS NFR BLD: 68.8 % (ref 38–73)
NITRITE UR QL STRIP: NEGATIVE
NRBC BLD-RTO: 0 /100 WBC
PH UR STRIP: 7 [PH] (ref 5–8)
PLATELET # BLD AUTO: 286 K/UL (ref 150–350)
PMV BLD AUTO: 9.6 FL (ref 9.2–12.9)
POTASSIUM SERPL-SCNC: 3.7 MMOL/L (ref 3.5–5.1)
PROT SERPL-MCNC: 8.3 G/DL (ref 6–8.4)
PROT UR QL STRIP: ABNORMAL
RBC # BLD AUTO: 5.01 M/UL (ref 4–5.4)
RBC #/AREA URNS HPF: 2 /HPF (ref 0–4)
SODIUM SERPL-SCNC: 138 MMOL/L (ref 136–145)
SP GR UR STRIP: >=1.03 (ref 1–1.03)
SQUAMOUS #/AREA URNS HPF: 1 /HPF
URN SPEC COLLECT METH UR: ABNORMAL
UROBILINOGEN UR STRIP-ACNC: NEGATIVE EU/DL
WBC # BLD AUTO: 13.93 K/UL (ref 3.9–12.7)
WBC #/AREA URNS HPF: 1 /HPF (ref 0–5)

## 2020-07-22 PROCEDURE — 81000 URINALYSIS NONAUTO W/SCOPE: CPT

## 2020-07-22 PROCEDURE — 80053 COMPREHEN METABOLIC PANEL: CPT

## 2020-07-22 PROCEDURE — 81025 URINE PREGNANCY TEST: CPT | Performed by: EMERGENCY MEDICINE

## 2020-07-22 PROCEDURE — 96374 THER/PROPH/DIAG INJ IV PUSH: CPT

## 2020-07-22 PROCEDURE — 63600175 PHARM REV CODE 636 W HCPCS: Performed by: EMERGENCY MEDICINE

## 2020-07-22 PROCEDURE — 36415 COLL VENOUS BLD VENIPUNCTURE: CPT

## 2020-07-22 PROCEDURE — 99284 EMERGENCY DEPT VISIT MOD MDM: CPT | Mod: 25

## 2020-07-22 PROCEDURE — 96375 TX/PRO/DX INJ NEW DRUG ADDON: CPT

## 2020-07-22 PROCEDURE — 83690 ASSAY OF LIPASE: CPT

## 2020-07-22 PROCEDURE — 85025 COMPLETE CBC W/AUTO DIFF WBC: CPT

## 2020-07-22 RX ORDER — ONDANSETRON 4 MG/1
4 TABLET, ORALLY DISINTEGRATING ORAL EVERY 8 HOURS PRN
Qty: 12 TABLET | Refills: 0 | Status: SHIPPED | OUTPATIENT
Start: 2020-07-22 | End: 2020-12-22 | Stop reason: CLARIF

## 2020-07-22 RX ORDER — ONDANSETRON 2 MG/ML
4 INJECTION INTRAMUSCULAR; INTRAVENOUS
Status: COMPLETED | OUTPATIENT
Start: 2020-07-22 | End: 2020-07-22

## 2020-07-22 RX ORDER — KETOROLAC TROMETHAMINE 30 MG/ML
10 INJECTION, SOLUTION INTRAMUSCULAR; INTRAVENOUS
Status: COMPLETED | OUTPATIENT
Start: 2020-07-22 | End: 2020-07-22

## 2020-07-22 RX ORDER — DICLOFENAC SODIUM 50 MG/1
50 TABLET, DELAYED RELEASE ORAL 3 TIMES DAILY PRN
Qty: 15 TABLET | Refills: 0 | Status: SHIPPED | OUTPATIENT
Start: 2020-07-22 | End: 2020-08-13

## 2020-07-22 RX ADMIN — KETOROLAC TROMETHAMINE 10 MG: 30 INJECTION, SOLUTION INTRAMUSCULAR at 03:07

## 2020-07-22 RX ADMIN — ONDANSETRON 4 MG: 2 INJECTION INTRAMUSCULAR; INTRAVENOUS at 03:07

## 2020-07-22 NOTE — ED PROVIDER NOTES
Encounter Date: 2020       History     Chief Complaint   Patient presents with    Abdominal Pain     Hx of colitis.  Diffuse Lower ABD pain     34-year-old female with a past medical history of irritable bowel syndrome, anxiety, and depression presents with a chief complaint of abdominal pain.  The patient her pain started acutely tonight after midnight.  She reports that it associated nausea and vomiting.  She reports that feels is active like multiple similar episodes pain that she gets almost every night after midnight for the last 3 years.  She denies any associated fever/chills, hematochezia, melena, hematemesis, diarrhea, chest pain, dysuria, hematuria, or shortness of breath.  There are no alleviating or aggravating factors.  The patient is currently being worked up as an outpatient by GI and gyn for her symptoms.        Review of patient's allergies indicates:  No Known Allergies  Past Medical History:   Diagnosis Date    Anxiety     Depression     Herpes     IBS (irritable bowel syndrome)     Menometrorrhagia      Past Surgical History:   Procedure Laterality Date     SECTION      COLONOSCOPY N/A 2019    Procedure: COLONOSCOPY;  Surgeon: Camacho Dove MD;  Location: [facility];  Service: Endoscopy;  Laterality: N/A;    HYSTEROSCOPY WITH DILATION AND CURETTAGE OF UTERUS N/A 2018    Procedure: HYSTEROSCOPY, WITH DILATION AND CURETTAGE OF UTERUS;  Surgeon: Gloria Umaña MD;  [facility];  Service: OB/GYN;  Laterality: N/A;    LASER ABLATION OF THE CERVIX      LIPOMA RESECTION      MYOMECTOMY N/A 2018    Procedure: MYOMECTOMY;  Surgeon: Gloria Umaña MD;  [facility];  Service: OB/GYN;  Laterality: N/A;  myosure REF RM- [id number]  time= 0.45  Aqulex Ref [id number]  [id number]     History reviewed. No pertinent family history.  Social History     Tobacco Use    Smoking status: Former Smoker     Packs/day: 1.00     Types: Cigarettes      Quit date: 2014     Years since quittin.0   Substance Use Topics    Alcohol use: Yes     Comment: socially    Drug use: Not on file     Review of Systems   Constitutional: Negative for chills and fever.   HENT: Negative for congestion.    Respiratory: Negative for cough and shortness of breath.    Cardiovascular: Negative for chest pain.   Gastrointestinal: Positive for abdominal pain, nausea and vomiting.   Genitourinary: Negative for dysuria.   Musculoskeletal: Negative for gait problem.   Skin: Negative for color change.   Neurological: Negative for dizziness and numbness.   Psychiatric/Behavioral: Negative for agitation.       Physical Exam     Initial Vitals [20 0238]   BP Pulse Resp Temp SpO2   120/83 81 16 98.5 °F (36.9 °C) 98 %      MAP       --         Physical Exam    Nursing note and vitals reviewed.  Constitutional: She appears well-developed and well-nourished.   HENT:   Head: Atraumatic.   Eyes: EOM are normal. Pupils are equal, round, and reactive to light.   Neck: Normal range of motion.   Cardiovascular: Normal rate and regular rhythm.   Pulmonary/Chest: Breath sounds normal.   Abdominal: Soft. Bowel sounds are normal. She exhibits no distension. There is no abdominal tenderness. There is no rebound and no guarding.   Musculoskeletal: Normal range of motion.      Right shoulder: Normal.      Left shoulder: Normal.   Neurological: She is alert and oriented to person, place, and time.   Skin: Skin is warm and dry.   Psychiatric: She has a normal mood and affect.         ED Course   Procedures  Labs Reviewed   URINALYSIS, REFLEX TO URINE CULTURE - Abnormal; Notable for the following components:       Result Value    Specific Gravity, UA >=1.030 (*)     Protein, UA 2+ (*)     All other components within normal limits    Narrative:     Specimen Source->Urine   URINALYSIS MICROSCOPIC    Narrative:     Specimen Source->Urine   COMPREHENSIVE METABOLIC PANEL   CBC W/ AUTO DIFFERENTIAL    LIPASE   POCT URINE PREGNANCY          Imaging Results    None          Medical Decision Making:   Initial Assessment:   34-year-old female presented with abdominal pain.  Differential Diagnosis:   Initial differential diagnosis included but not limited to chronic pain, irritable bowel syndrome, and peptic ulcer disease.  Clinical Tests:   Lab Tests: Ordered and Reviewed  ED Management:  The patient was emergently evaluated in the emergency department, her evaluation was significant for a young female with a benign abdominal exam.  The patient's labs showed no acute abnormalities.  The patient was treated with a dose of parental Toradol and parental Zofran, with resolution of her symptoms.  The patient reports that she feels much better after treatment.  The patient's diagnosis at this time is epigastric abdominal pain.  The patient's pain is also likely chronic in origin and I do not believe that she needs any further workup at this time.  She is discharged to home to follow-up with her gyn and GI physicians for further care and workup of her pain.  She will be discharged home with p.o. diclofenac and ODT Zofran as well.                                 Clinical Impression:       ICD-10-CM ICD-9-CM   1. Epigastric abdominal pain  R10.13 789.06                                Mark Aguilar MD  07/22/20 0525

## 2020-08-05 DIAGNOSIS — F41.9 ANXIETY DISORDER, UNSPECIFIED: Primary | ICD-10-CM

## 2020-08-06 ENCOUNTER — HOSPITAL ENCOUNTER (OUTPATIENT)
Dept: RADIOLOGY | Facility: HOSPITAL | Age: 35
Discharge: HOME | End: 2020-08-06
Attending: NURSE PRACTITIONER
Payer: MEDICAID

## 2020-08-06 DIAGNOSIS — F41.9 ANXIETY DISORDER, UNSPECIFIED: ICD-10-CM

## 2020-08-06 PROCEDURE — 71046 X-RAY EXAM CHEST 2 VIEWS: CPT | Mod: TC,PO

## 2020-08-10 ENCOUNTER — HOSPITAL ENCOUNTER (EMERGENCY)
Facility: HOSPITAL | Age: 35
Discharge: HOME | End: 2020-08-10
Attending: EMERGENCY MEDICINE
Payer: MEDICAID

## 2020-08-10 VITALS
DIASTOLIC BLOOD PRESSURE: 80 MMHG | SYSTOLIC BLOOD PRESSURE: 127 MMHG | OXYGEN SATURATION: 100 % | TEMPERATURE: 98 F | BODY MASS INDEX: 29.12 KG/M2 | WEIGHT: 175 LBS | HEART RATE: 80 BPM | RESPIRATION RATE: 17 BRPM

## 2020-08-10 DIAGNOSIS — R11.2 NON-INTRACTABLE VOMITING WITH NAUSEA, UNSPECIFIED VOMITING TYPE: Primary | ICD-10-CM

## 2020-08-10 DIAGNOSIS — R10.13 EPIGASTRIC PAIN: ICD-10-CM

## 2020-08-10 LAB
ALBUMIN SERPL BCP-MCNC: 4 G/DL (ref 3.5–5.2)
ALP SERPL-CCNC: 72 U/L (ref 55–135)
ALT SERPL W/O P-5'-P-CCNC: 20 U/L (ref 10–44)
ANION GAP SERPL CALC-SCNC: 14 MMOL/L (ref 8–16)
AST SERPL-CCNC: 17 U/L (ref 10–40)
B-HCG UR QL: NEGATIVE
BASOPHILS # BLD AUTO: 0.03 K/UL (ref 0–0.2)
BASOPHILS NFR BLD: 0.2 % (ref 0–1.9)
BILIRUB SERPL-MCNC: 0.4 MG/DL (ref 0.1–1)
BILIRUB UR QL STRIP: NEGATIVE
BUN SERPL-MCNC: 6 MG/DL (ref 6–20)
CALCIUM SERPL-MCNC: 9.2 MG/DL (ref 8.7–10.5)
CHLORIDE SERPL-SCNC: 109 MMOL/L (ref 95–110)
CLARITY UR: CLEAR
CO2 SERPL-SCNC: 18 MMOL/L (ref 23–29)
COLOR UR: YELLOW
CREAT SERPL-MCNC: 0.8 MG/DL (ref 0.5–1.4)
CTP QC/QA: YES
DIFFERENTIAL METHOD: ABNORMAL
EOSINOPHIL # BLD AUTO: 0 K/UL (ref 0–0.5)
EOSINOPHIL NFR BLD: 0.1 % (ref 0–8)
ERYTHROCYTE [DISTWIDTH] IN BLOOD BY AUTOMATED COUNT: 13 % (ref 11.5–14.5)
EST. GFR  (AFRICAN AMERICAN): >60 ML/MIN/1.73 M^2
EST. GFR  (NON AFRICAN AMERICAN): >60 ML/MIN/1.73 M^2
GLUCOSE SERPL-MCNC: 139 MG/DL (ref 70–110)
GLUCOSE UR QL STRIP: NEGATIVE
HCT VFR BLD AUTO: 43.9 % (ref 37–48.5)
HGB BLD-MCNC: 14.3 G/DL (ref 12–16)
HGB UR QL STRIP: NEGATIVE
IMM GRANULOCYTES # BLD AUTO: 0.06 K/UL (ref 0–0.04)
IMM GRANULOCYTES NFR BLD AUTO: 0.4 % (ref 0–0.5)
KETONES UR QL STRIP: ABNORMAL
LEUKOCYTE ESTERASE UR QL STRIP: NEGATIVE
LIPASE SERPL-CCNC: 6 U/L (ref 4–60)
LYMPHOCYTES # BLD AUTO: 2.2 K/UL (ref 1–4.8)
LYMPHOCYTES NFR BLD: 14.3 % (ref 18–48)
MCH RBC QN AUTO: 29.7 PG (ref 27–31)
MCHC RBC AUTO-ENTMCNC: 32.6 G/DL (ref 32–36)
MCV RBC AUTO: 91 FL (ref 82–98)
MONOCYTES # BLD AUTO: 0.8 K/UL (ref 0.3–1)
MONOCYTES NFR BLD: 4.9 % (ref 4–15)
NEUTROPHILS # BLD AUTO: 12.6 K/UL (ref 1.8–7.7)
NEUTROPHILS NFR BLD: 80.1 % (ref 38–73)
NITRITE UR QL STRIP: NEGATIVE
NRBC BLD-RTO: 0 /100 WBC
PH UR STRIP: 7 [PH] (ref 5–8)
PLATELET # BLD AUTO: 233 K/UL (ref 150–350)
PMV BLD AUTO: 10 FL (ref 9.2–12.9)
POTASSIUM SERPL-SCNC: 3.4 MMOL/L (ref 3.5–5.1)
PROT SERPL-MCNC: 7.9 G/DL (ref 6–8.4)
PROT UR QL STRIP: NEGATIVE
RBC # BLD AUTO: 4.81 M/UL (ref 4–5.4)
SODIUM SERPL-SCNC: 141 MMOL/L (ref 136–145)
SP GR UR STRIP: 1.02 (ref 1–1.03)
URN SPEC COLLECT METH UR: ABNORMAL
UROBILINOGEN UR STRIP-ACNC: NEGATIVE EU/DL
WBC # BLD AUTO: 15.68 K/UL (ref 3.9–12.7)

## 2020-08-10 PROCEDURE — 80053 COMPREHEN METABOLIC PANEL: CPT

## 2020-08-10 PROCEDURE — A9698 NON-RAD CONTRAST MATERIALNOC: HCPCS

## 2020-08-10 PROCEDURE — 99285 EMERGENCY DEPT VISIT HI MDM: CPT | Mod: 25

## 2020-08-10 PROCEDURE — 96365 THER/PROPH/DIAG IV INF INIT: CPT | Mod: 59

## 2020-08-10 PROCEDURE — 96375 TX/PRO/DX INJ NEW DRUG ADDON: CPT

## 2020-08-10 PROCEDURE — 96366 THER/PROPH/DIAG IV INF ADDON: CPT

## 2020-08-10 PROCEDURE — 25500020 PHARM REV CODE 255

## 2020-08-10 PROCEDURE — 83690 ASSAY OF LIPASE: CPT

## 2020-08-10 PROCEDURE — 81003 URINALYSIS AUTO W/O SCOPE: CPT

## 2020-08-10 PROCEDURE — 85025 COMPLETE CBC W/AUTO DIFF WBC: CPT

## 2020-08-10 PROCEDURE — 96361 HYDRATE IV INFUSION ADD-ON: CPT

## 2020-08-10 PROCEDURE — 63600175 PHARM REV CODE 636 W HCPCS: Performed by: EMERGENCY MEDICINE

## 2020-08-10 PROCEDURE — 25000003 PHARM REV CODE 250: Performed by: EMERGENCY MEDICINE

## 2020-08-10 PROCEDURE — 36415 COLL VENOUS BLD VENIPUNCTURE: CPT

## 2020-08-10 PROCEDURE — 81025 URINE PREGNANCY TEST: CPT | Performed by: EMERGENCY MEDICINE

## 2020-08-10 RX ORDER — MORPHINE SULFATE 2 MG/ML
2 INJECTION, SOLUTION INTRAMUSCULAR; INTRAVENOUS
Status: COMPLETED | OUTPATIENT
Start: 2020-08-10 | End: 2020-08-10

## 2020-08-10 RX ORDER — POTASSIUM CHLORIDE 20 MEQ/1
40 TABLET, EXTENDED RELEASE ORAL
Status: COMPLETED | OUTPATIENT
Start: 2020-08-10 | End: 2020-08-10

## 2020-08-10 RX ORDER — HALOPERIDOL 5 MG/ML
5 INJECTION INTRAMUSCULAR
Status: COMPLETED | OUTPATIENT
Start: 2020-08-10 | End: 2020-08-10

## 2020-08-10 RX ADMIN — POTASSIUM CHLORIDE 40 MEQ: 1500 TABLET, FILM COATED, EXTENDED RELEASE ORAL at 02:08

## 2020-08-10 RX ADMIN — MORPHINE SULFATE 2 MG: 2 INJECTION, SOLUTION INTRAMUSCULAR; INTRAVENOUS at 11:08

## 2020-08-10 RX ADMIN — IOHEXOL 1000 ML: 9 SOLUTION ORAL at 12:08

## 2020-08-10 RX ADMIN — DEXTROSE MONOHYDRATE 25 MG: 50 INJECTION, SOLUTION INTRAVENOUS at 12:08

## 2020-08-10 RX ADMIN — HALOPERIDOL LACTATE 5 MG: 5 INJECTION, SOLUTION INTRAMUSCULAR at 11:08

## 2020-08-10 RX ADMIN — SODIUM CHLORIDE 1000 ML: 0.9 INJECTION, SOLUTION INTRAVENOUS at 11:08

## 2020-08-10 RX ADMIN — IOHEXOL 75 ML: 350 INJECTION, SOLUTION INTRAVENOUS at 12:08

## 2020-08-10 NOTE — ED NOTES
Patient reports that her pain has improved to 4/10 and her nausea is still present, but is not as bad at it was upon arrival.

## 2020-08-10 NOTE — ED NOTES
Patient reports some nausea while drinking contrast. Dr. Robledo is aware. Patient will be given potassium when nausea resolves.

## 2020-08-10 NOTE — ED NOTES
Eva Escalera presents to the ED with c/o nausea and emesis that started 2 hours ago. Patient reports that she had an EGD done this morning. Patient reports this happened one other time after being sedated. Associated complaints are lower abdominal pain that feels gas like. Patient appears to be uncomfortable in the bed. Mucous membranes are pink and moist. Skin is warm, dry and intact. Lungs are clear bilaterally, respirations are regular and unlabored. Denies SOB, cough, congestion or rhinorrhea. BS active x4, generalized tenderness with palpation, abd is soft and not distended. Denies any appetite or activity change. S1S2, capillary refill is < 2 seconds. Denies dysuria, difficulty urinating, frequency, numbness, tingling or weakness. MUKUND ESTRADA

## 2020-08-10 NOTE — ED PROVIDER NOTES
Encounter Date: 8/10/2020    SCRIBE #1 NOTE: Malena HELLER am scribing for, and in the presence of, Cornelio Robledo MD.       History     Chief Complaint   Patient presents with    Abdominal Pain     S/p EGD this AM; Presents with nausea/vomitng, and abdominal pain post procedure     Time seen by provider: 10:33 AM on 08/10/2020    Eva Escalera is a 34 y.o. female with PMHx of colitis and IBS who presents to the ED via EMS with an onset of abdominal pain with associated nausea and vomiting for 2 hours s/p EGD with Dr. Dove this morning. She states that she has had a previous episode of these symptoms following a procedure involving anesthesia years ago. Zofran was administered by EMS PTA. The patient denies diarrhea, burning urination, blood in her urine, or any other symptoms at this time. No pertinent PSHx. NKDA.      The history is provided by the patient.     Review of patient's allergies indicates:  No Known Allergies  Past Medical History:   Diagnosis Date    Anxiety     Depression     Herpes     IBS (irritable bowel syndrome)     Menometrorrhagia      Past Surgical History:   Procedure Laterality Date     SECTION      COLONOSCOPY N/A 2019    Procedure: COLONOSCOPY;  Surgeon: Camacho Dove MD;  Location: [facility];  Service: Endoscopy;  Laterality: N/A;    HYSTEROSCOPY WITH DILATION AND CURETTAGE OF UTERUS N/A 2018    Procedure: HYSTEROSCOPY, WITH DILATION AND CURETTAGE OF UTERUS;  Surgeon: Gloria Umaña MD;  Location: [facility] OR;  Service: OB/GYN;  Laterality: N/A;    LASER ABLATION OF THE CERVIX      LIPOMA RESECTION      MYOMECTOMY N/A 2018    Procedure: MYOMECTOMY;  Surgeon: Gloria Umaña MD;  Location: [facility] OR;  Service: OB/GYN;  Laterality: N/A;  myosure REF RM- [id number]  time= 0.45  Aqulex Ref AQL-100P  [id number]     No family history on file.  Social History     Tobacco Use    Smoking status: Former Smoker     Packs/day:  1.00     Types: Cigarettes     Quit date: 2014     Years since quittin.0   Substance Use Topics    Alcohol use: Yes     Comment: socially    Drug use: Not on file     Review of Systems   Constitutional: Negative for activity change, diaphoresis and fever.   HENT: Negative for ear pain, rhinorrhea, sore throat and trouble swallowing.    Eyes: Negative for pain and visual disturbance.   Respiratory: Negative for cough, shortness of breath and stridor.    Cardiovascular: Negative for chest pain.   Gastrointestinal: Positive for abdominal pain, nausea and vomiting. Negative for blood in stool and diarrhea.   Genitourinary: Negative for dysuria, hematuria, vaginal bleeding and vaginal discharge.   Musculoskeletal: Negative for gait problem.   Skin: Negative for rash and wound.   Neurological: Negative for seizures and headaches.   Psychiatric/Behavioral: Negative for hallucinations and suicidal ideas.       Physical Exam     Initial Vitals   BP Pulse Resp Temp SpO2   08/10/20 1117 08/10/20 1023 08/10/20 1023 08/10/20 1023 08/10/20 1023   130/78 89 20 97.7 °F (36.5 °C) 99 %      MAP       --                Physical Exam    Nursing note and vitals reviewed.  Constitutional: She appears well-developed. She is not diaphoretic. No distress.   HENT:   Head: Normocephalic and atraumatic.   Nose: Nose normal.   Eyes: EOM are normal. No scleral icterus.   Neck: Normal range of motion. Neck supple.   Cardiovascular: Normal rate, regular rhythm, normal heart sounds and intact distal pulses. Exam reveals no gallop and no friction rub.    No murmur heard.  Pulmonary/Chest: Breath sounds normal. No stridor. No respiratory distress. She has no wheezes. She has no rhonchi. She has no rales.   Abdominal: Soft. Bowel sounds are normal. She exhibits no distension and no mass. There is abdominal tenderness (diffuse but worse in epigastric region). There is guarding (voluntary). There is no rebound and no CVA tenderness.    Musculoskeletal: Normal range of motion.   Neurological: She is alert and oriented to person, place, and time. No cranial nerve deficit.   Skin: Skin is warm and dry. Capillary refill takes less than 2 seconds. No rash noted.   Psychiatric: She has a normal mood and affect.         ED Course   Procedures  Labs Reviewed   CBC W/ AUTO DIFFERENTIAL - Abnormal; Notable for the following components:       Result Value    WBC 15.68 (*)     Gran # (ANC) 12.6 (*)     Immature Grans (Abs) 0.06 (*)     Gran% 80.1 (*)     Lymph% 14.3 (*)     All other components within normal limits   COMPREHENSIVE METABOLIC PANEL - Abnormal; Notable for the following components:    Potassium 3.4 (*)     CO2 18 (*)     Glucose 139 (*)     All other components within normal limits   URINALYSIS, REFLEX TO URINE CULTURE - Abnormal; Notable for the following components:    Ketones, UA 1+ (*)     All other components within normal limits    Narrative:     Specimen Source->Urine   LIPASE   POCT URINE PREGNANCY          Imaging Results          CT Abdomen Pelvis With Contrast (Final result)  Result time 08/10/20 13:06:00    Final result by Pawel Thomas MD (08/10/20 13:06:00)                 Impression:      No acute findings in the abdomen.      Electronically signed by: Pawel Thomas  Date:    08/10/2020  Time:    13:06             Narrative:    EXAMINATION:  CT ABDOMEN PELVIS WITH CONTRAST    CLINICAL HISTORY:  Abdominal pain, acute, nonlocalized;    TECHNIQUE:  Low dose axial images, sagittal and coronal reformations were obtained from the lung bases to the pubic symphysis following the IV administration of 75 mL of Omnipaque 350 and the oral administration of 1000 mL of Omnipaque 9.    COMPARISON:  01/22/2019    FINDINGS:  Lung bases are clear.  Normal sized heart.  Decompressed gallbladder.    Solid abdominal organs including liver spleen pancreas adrenal glands and kidneys are unremarkable.    There is enteric contrast. Stomach is  mildly distended with contrast.  No extraluminal extravasation of contrast.  No pneumoperitoneum.  Remaining bowel loops are normal in caliber. Normal appendix.    2 cm cystic lesion right adnexa.  Unremarkable uterus.  Mildly dilated urinary bladder.    No acute osseous abnormality.                               X-Ray Chest 1 View (Final result)  Result time 08/10/20 11:18:34    Final result by Eric Mason Jr., MD (08/10/20 11:18:34)                 Impression:      New right lower lobe infiltrate consistent with probable pneumonia.      Electronically signed by: Eric Mason MD  Date:    08/10/2020  Time:    11:18             Narrative:    EXAMINATION:  XR CHEST 1 VIEW    CLINICAL HISTORY:  Epigastric pain    TECHNIQUE:  Single frontal view of the chest was performed.    COMPARISON:  Chest of August 6, 2020.    FINDINGS:  The mediastinal and cardiac size and contours are normal.  There is increased density at the right lower lung field consistent with an infiltrate in the right lower lobe.  This was not seen on prior studies.  The upper lung fields are clear.  No pneumothorax or pleural effusion is noted.                                 Medical Decision Making:   History:   Old Medical Records: I decided to obtain old medical records.  Clinical Tests:   Lab Tests: Ordered and Reviewed  Radiological Study: Ordered and Reviewed  ED Management:  Patient's symptoms not typical for other emergent causes of abdominal pain such as, but not limited to, appendicitis, abdominal aortic aneurysm, surgical biliary disease, pancreatitis, SBO, mesenteric ischemia, serious intra-abdominal bacterial illness. Presentation also not typical of gynecologic emergencies such as TOA, Ovarian Torsion, PID.  Not Ectopic.  Doubt atypical ACS.  Given recent EGD ordered CT with PO contrast for possible perf and was neg. CXR read says possible RL infiltrate howver patient denies cough, CP, sob, fevers or any signs/symptoms of  pneumonia so joint decision making made to not prescribe antibiotics.   Pt tolerating PO and pain controlled.  Patient will be discharged with strict return precautions and follow up closely with PCP/Gyn for further evaluation. Pt understands and agrees with discharge instructions. Pt also given strict return precautions for any new or worsening symptoms and plans to follow up closely with PCP.               Scribe Attestation:   Scribe #1: I performed the above scribed service and the documentation accurately describes the services I performed. I attest to the accuracy of the note.      Attending Attestation:     Physician Attestation for Scribe:    I, Dr. Cornelio Robledo, personally performed the services described in this documentation.   All medical record entries made by the scribe were at my direction and in my presence.   I have reviewed the chart and agree that the record is accurate and complete.   Cornelio Robledo MD  11:40 PM 08/10/2020     DISCLAIMER: This note was prepared with "[employer]" Naturally Speaking voice recognition transcription software. Garbled syntax, mangled pronouns, and other bizarre constructions may be attributed to that software system.          ED Course as of Aug 10 2341   Mon Aug 10, 2020   1032 34-year-old female with a past medical history of irritable bowel syndrome, anxiety, and depression presents with abdominal pain status post EGD is morning.    [BD]   1210 Impression:     New right lower lobe infiltrate consistent with probable pneumonia.        Electronically signed by: Eric Mason MD  Date:                                            08/10/2020  Time:                                           11:18    [BD]   1309 Impression:     No acute findings in the abdomen.        Electronically signed by: Pawel Thomas  Date:                                            08/10/2020  Time:                                           13:06    [BD]      ED Course User Index  [BD] Cornelio  MD Venkat                Clinical Impression:       ICD-10-CM ICD-9-CM   1. Non-intractable vomiting with nausea, unspecified vomiting type  R11.2 787.01   2. Epigastric pain  R10.13 789.06         Disposition:   Disposition: Discharged  Condition: Stable     ED Disposition Condition    Discharge Stable        ED Prescriptions     None        Follow-up Information     Follow up With Specialties Details Why Contact Info    Dami Galeana Jr., MD Family Medicine Go in 1 day  [address] [place] [id number]  [phone]      [facility] Emergency Medicine Go to  As needed, If symptoms worsen [address] [facility] [id number]  [phone]                                     Cornelio Robledo MD  08/10/20 2497

## 2020-08-10 NOTE — ED NOTES
Upon discharge, patient is AAOx4, no cardiac or respiratory complications. Follow up care reviewed with patient and has been instructed to return to the ER if needed. Patient verbalized understanding and ambulated to the lobby without difficulty. NATALIE DUVAL

## 2020-08-12 ENCOUNTER — HOSPITAL ENCOUNTER (EMERGENCY)
Facility: HOSPITAL | Age: 35
Discharge: HOME | End: 2020-08-12
Payer: MEDICAID

## 2020-08-12 VITALS
RESPIRATION RATE: 18 BRPM | OXYGEN SATURATION: 97 % | SYSTOLIC BLOOD PRESSURE: 154 MMHG | BODY MASS INDEX: 28.32 KG/M2 | HEART RATE: 93 BPM | DIASTOLIC BLOOD PRESSURE: 89 MMHG | TEMPERATURE: 98 F | HEIGHT: 65 IN | WEIGHT: 170 LBS

## 2020-08-12 LAB
ALBUMIN SERPL BCP-MCNC: 4.6 G/DL (ref 3.5–5.2)
ALP SERPL-CCNC: 60 U/L (ref 55–135)
ALT SERPL W/O P-5'-P-CCNC: 31 U/L (ref 10–44)
ANION GAP SERPL CALC-SCNC: 14 MMOL/L (ref 8–16)
AST SERPL-CCNC: 36 U/L (ref 10–40)
B-HCG UR QL: NEGATIVE
BASOPHILS # BLD AUTO: 0.02 K/UL (ref 0–0.2)
BASOPHILS NFR BLD: 0.2 % (ref 0–1.9)
BILIRUB SERPL-MCNC: 1.3 MG/DL (ref 0.1–1)
BUN SERPL-MCNC: 10 MG/DL (ref 6–20)
CALCIUM SERPL-MCNC: 10 MG/DL (ref 8.7–10.5)
CHLORIDE SERPL-SCNC: 94 MMOL/L (ref 95–110)
CO2 SERPL-SCNC: 22 MMOL/L (ref 23–29)
CREAT SERPL-MCNC: 0.9 MG/DL (ref 0.5–1.4)
CTP QC/QA: YES
DIFFERENTIAL METHOD: ABNORMAL
EOSINOPHIL # BLD AUTO: 0 K/UL (ref 0–0.5)
EOSINOPHIL NFR BLD: 0.2 % (ref 0–8)
ERYTHROCYTE [DISTWIDTH] IN BLOOD BY AUTOMATED COUNT: 12.2 % (ref 11.5–14.5)
EST. GFR  (AFRICAN AMERICAN): >60 ML/MIN/1.73 M^2
EST. GFR  (NON AFRICAN AMERICAN): >60 ML/MIN/1.73 M^2
GLUCOSE SERPL-MCNC: 99 MG/DL (ref 70–110)
HCT VFR BLD AUTO: 46.7 % (ref 37–48.5)
HGB BLD-MCNC: 16.2 G/DL (ref 12–16)
IMM GRANULOCYTES # BLD AUTO: 0.05 K/UL (ref 0–0.04)
IMM GRANULOCYTES NFR BLD AUTO: 0.4 % (ref 0–0.5)
LIPASE SERPL-CCNC: 19 U/L (ref 4–60)
LYMPHOCYTES # BLD AUTO: 2.9 K/UL (ref 1–4.8)
LYMPHOCYTES NFR BLD: 21.7 % (ref 18–48)
MCH RBC QN AUTO: 29.6 PG (ref 27–31)
MCHC RBC AUTO-ENTMCNC: 34.7 G/DL (ref 32–36)
MCV RBC AUTO: 85 FL (ref 82–98)
MONOCYTES # BLD AUTO: 1.4 K/UL (ref 0.3–1)
MONOCYTES NFR BLD: 10.1 % (ref 4–15)
NEUTROPHILS # BLD AUTO: 9 K/UL (ref 1.8–7.7)
NEUTROPHILS NFR BLD: 67.4 % (ref 38–73)
NRBC BLD-RTO: 0 /100 WBC
PLATELET # BLD AUTO: 295 K/UL (ref 150–350)
PMV BLD AUTO: 9.6 FL (ref 9.2–12.9)
POTASSIUM SERPL-SCNC: 3.1 MMOL/L (ref 3.5–5.1)
PROT SERPL-MCNC: 8.7 G/DL (ref 6–8.4)
RBC # BLD AUTO: 5.47 M/UL (ref 4–5.4)
SODIUM SERPL-SCNC: 130 MMOL/L (ref 136–145)
WBC # BLD AUTO: 13.33 K/UL (ref 3.9–12.7)

## 2020-08-12 PROCEDURE — 85025 COMPLETE CBC W/AUTO DIFF WBC: CPT

## 2020-08-12 PROCEDURE — 80053 COMPREHEN METABOLIC PANEL: CPT

## 2020-08-12 PROCEDURE — 36415 COLL VENOUS BLD VENIPUNCTURE: CPT

## 2020-08-12 PROCEDURE — 99283 EMERGENCY DEPT VISIT LOW MDM: CPT

## 2020-08-12 PROCEDURE — 83690 ASSAY OF LIPASE: CPT

## 2020-08-12 PROCEDURE — 81025 URINE PREGNANCY TEST: CPT | Performed by: NURSE PRACTITIONER

## 2020-08-13 ENCOUNTER — HOSPITAL ENCOUNTER (EMERGENCY)
Facility: HOSPITAL | Age: 35
Discharge: HOME | End: 2020-08-13
Attending: EMERGENCY MEDICINE
Payer: MEDICAID

## 2020-08-13 VITALS
BODY MASS INDEX: 27.32 KG/M2 | HEART RATE: 84 BPM | SYSTOLIC BLOOD PRESSURE: 142 MMHG | RESPIRATION RATE: 20 BRPM | WEIGHT: 170 LBS | TEMPERATURE: 98 F | OXYGEN SATURATION: 99 % | DIASTOLIC BLOOD PRESSURE: 95 MMHG | HEIGHT: 66 IN

## 2020-08-13 DIAGNOSIS — R11.0 NAUSEA: ICD-10-CM

## 2020-08-13 DIAGNOSIS — E87.6 HYPOKALEMIA: ICD-10-CM

## 2020-08-13 DIAGNOSIS — R10.13 EPIGASTRIC PAIN: Primary | ICD-10-CM

## 2020-08-13 LAB
ALBUMIN SERPL BCP-MCNC: 4.4 G/DL (ref 3.5–5.2)
ALP SERPL-CCNC: 61 U/L (ref 55–135)
ALT SERPL W/O P-5'-P-CCNC: 31 U/L (ref 10–44)
ANION GAP SERPL CALC-SCNC: 14 MMOL/L (ref 8–16)
AST SERPL-CCNC: 29 U/L (ref 10–40)
BASOPHILS # BLD AUTO: 0.03 K/UL (ref 0–0.2)
BASOPHILS NFR BLD: 0.2 % (ref 0–1.9)
BILIRUB SERPL-MCNC: 1.4 MG/DL (ref 0.1–1)
BUN SERPL-MCNC: 11 MG/DL (ref 6–20)
CALCIUM SERPL-MCNC: 9.8 MG/DL (ref 8.7–10.5)
CHLORIDE SERPL-SCNC: 95 MMOL/L (ref 95–110)
CO2 SERPL-SCNC: 22 MMOL/L (ref 23–29)
CREAT SERPL-MCNC: 0.8 MG/DL (ref 0.5–1.4)
DIFFERENTIAL METHOD: ABNORMAL
EOSINOPHIL # BLD AUTO: 0.1 K/UL (ref 0–0.5)
EOSINOPHIL NFR BLD: 0.4 % (ref 0–8)
ERYTHROCYTE [DISTWIDTH] IN BLOOD BY AUTOMATED COUNT: 12.1 % (ref 11.5–14.5)
EST. GFR  (AFRICAN AMERICAN): >60 ML/MIN/1.73 M^2
EST. GFR  (NON AFRICAN AMERICAN): >60 ML/MIN/1.73 M^2
GLUCOSE SERPL-MCNC: 93 MG/DL (ref 70–110)
HCT VFR BLD AUTO: 46.7 % (ref 37–48.5)
HGB BLD-MCNC: 16.2 G/DL (ref 12–16)
IMM GRANULOCYTES # BLD AUTO: 0.06 K/UL (ref 0–0.04)
IMM GRANULOCYTES NFR BLD AUTO: 0.4 % (ref 0–0.5)
LIPASE SERPL-CCNC: 17 U/L (ref 4–60)
LYMPHOCYTES # BLD AUTO: 2.8 K/UL (ref 1–4.8)
LYMPHOCYTES NFR BLD: 20.6 % (ref 18–48)
MCH RBC QN AUTO: 29.7 PG (ref 27–31)
MCHC RBC AUTO-ENTMCNC: 34.7 G/DL (ref 32–36)
MCV RBC AUTO: 86 FL (ref 82–98)
MONOCYTES # BLD AUTO: 1.6 K/UL (ref 0.3–1)
MONOCYTES NFR BLD: 12 % (ref 4–15)
NEUTROPHILS # BLD AUTO: 9.1 K/UL (ref 1.8–7.7)
NEUTROPHILS NFR BLD: 66.4 % (ref 38–73)
NRBC BLD-RTO: 0 /100 WBC
PLATELET # BLD AUTO: 317 K/UL (ref 150–350)
PMV BLD AUTO: 10.1 FL (ref 9.2–12.9)
POTASSIUM SERPL-SCNC: 3 MMOL/L (ref 3.5–5.1)
PROT SERPL-MCNC: 8.4 G/DL (ref 6–8.4)
RBC # BLD AUTO: 5.46 M/UL (ref 4–5.4)
SODIUM SERPL-SCNC: 131 MMOL/L (ref 136–145)
WBC # BLD AUTO: 13.66 K/UL (ref 3.9–12.7)

## 2020-08-13 PROCEDURE — 25500020 PHARM REV CODE 255: Performed by: EMERGENCY MEDICINE

## 2020-08-13 PROCEDURE — 25000003 PHARM REV CODE 250: Performed by: EMERGENCY MEDICINE

## 2020-08-13 PROCEDURE — 63600175 PHARM REV CODE 636 W HCPCS: Performed by: EMERGENCY MEDICINE

## 2020-08-13 PROCEDURE — 96366 THER/PROPH/DIAG IV INF ADDON: CPT

## 2020-08-13 PROCEDURE — 96365 THER/PROPH/DIAG IV INF INIT: CPT

## 2020-08-13 PROCEDURE — 99284 EMERGENCY DEPT VISIT MOD MDM: CPT | Mod: 25

## 2020-08-13 PROCEDURE — 85025 COMPLETE CBC W/AUTO DIFF WBC: CPT

## 2020-08-13 PROCEDURE — C9113 INJ PANTOPRAZOLE SODIUM, VIA: HCPCS | Performed by: EMERGENCY MEDICINE

## 2020-08-13 PROCEDURE — 96375 TX/PRO/DX INJ NEW DRUG ADDON: CPT

## 2020-08-13 PROCEDURE — 80053 COMPREHEN METABOLIC PANEL: CPT

## 2020-08-13 PROCEDURE — 83690 ASSAY OF LIPASE: CPT

## 2020-08-13 RX ORDER — IBUPROFEN 100 MG/5ML
1000 SUSPENSION, ORAL (FINAL DOSE FORM) ORAL DAILY
COMMUNITY
End: 2020-12-22 | Stop reason: CLARIF

## 2020-08-13 RX ORDER — BETAMETHASONE VALERATE 0.1 %
1 LOTION (ML) TOPICAL DAILY
Status: ON HOLD | COMMUNITY
Start: 2020-07-15 | End: 2020-08-17 | Stop reason: HOSPADM

## 2020-08-13 RX ORDER — POTASSIUM CHLORIDE 750 MG/1
10 TABLET, EXTENDED RELEASE ORAL DAILY
Qty: 30 TABLET | Refills: 0 | Status: SHIPPED | OUTPATIENT
Start: 2020-08-13 | End: 2020-12-22 | Stop reason: CLARIF

## 2020-08-13 RX ORDER — POTASSIUM CHLORIDE 20 MEQ/1
40 TABLET, EXTENDED RELEASE ORAL ONCE
Status: COMPLETED | OUTPATIENT
Start: 2020-08-13 | End: 2020-08-13

## 2020-08-13 RX ORDER — ASPIRIN 325 MG
50000 TABLET, DELAYED RELEASE (ENTERIC COATED) ORAL
COMMUNITY
Start: 2020-07-29

## 2020-08-13 RX ORDER — PANTOPRAZOLE SODIUM 20 MG/1
40 TABLET, DELAYED RELEASE ORAL DAILY
Qty: 30 TABLET | Refills: 0 | Status: ON HOLD | OUTPATIENT
Start: 2020-08-13 | End: 2020-08-17 | Stop reason: SDUPTHER

## 2020-08-13 RX ORDER — SUCRALFATE 1 G/1
1 TABLET ORAL 4 TIMES DAILY
Qty: 40 TABLET | Refills: 0 | Status: SHIPPED | OUTPATIENT
Start: 2020-08-13 | End: 2020-12-22 | Stop reason: CLARIF

## 2020-08-13 RX ORDER — PANTOPRAZOLE SODIUM 40 MG/10ML
40 INJECTION, POWDER, LYOPHILIZED, FOR SOLUTION INTRAVENOUS
Status: COMPLETED | OUTPATIENT
Start: 2020-08-13 | End: 2020-08-13

## 2020-08-13 RX ORDER — PROMETHAZINE HYDROCHLORIDE 25 MG/1
25 TABLET ORAL EVERY 6 HOURS PRN
Qty: 15 TABLET | Refills: 0 | Status: SHIPPED | OUTPATIENT
Start: 2020-08-13 | End: 2020-12-22 | Stop reason: CLARIF

## 2020-08-13 RX ORDER — IBUPROFEN 800 MG/1
800 TABLET ORAL 3 TIMES DAILY
Status: ON HOLD | COMMUNITY
Start: 2020-07-27 | End: 2020-08-17 | Stop reason: HOSPADM

## 2020-08-13 RX ORDER — FERROUS SULFATE 324(65)MG
325 TABLET, DELAYED RELEASE (ENTERIC COATED) ORAL DAILY
COMMUNITY
End: 2020-12-22 | Stop reason: CLARIF

## 2020-08-13 RX ORDER — LINACLOTIDE 145 UG/1
145 CAPSULE, GELATIN COATED ORAL DAILY
Status: ON HOLD | COMMUNITY
Start: 2020-07-08 | End: 2020-08-17 | Stop reason: HOSPADM

## 2020-08-13 RX ORDER — SODIUM CHLORIDE 9 MG/ML
1000 INJECTION, SOLUTION INTRAVENOUS
Status: COMPLETED | OUTPATIENT
Start: 2020-08-13 | End: 2020-08-13

## 2020-08-13 RX ORDER — PROMETHAZINE HYDROCHLORIDE 25 MG/1
25 TABLET ORAL EVERY 6 HOURS PRN
Qty: 15 TABLET | Refills: 0 | Status: SHIPPED | OUTPATIENT
Start: 2020-08-13 | End: 2020-08-13 | Stop reason: SDUPTHER

## 2020-08-13 RX ORDER — CLOTRIMAZOLE 1 %
1 CREAM (GRAM) TOPICAL 2 TIMES DAILY
Status: ON HOLD | COMMUNITY
Start: 2020-07-27 | End: 2020-08-17 | Stop reason: HOSPADM

## 2020-08-13 RX ORDER — FOLIC ACID 0.4 MG
400 TABLET ORAL DAILY
COMMUNITY
End: 2020-12-22 | Stop reason: CLARIF

## 2020-08-13 RX ADMIN — POTASSIUM CHLORIDE 40 MEQ: 20 TABLET, EXTENDED RELEASE ORAL at 01:08

## 2020-08-13 RX ADMIN — IOHEXOL 100 ML: 350 INJECTION, SOLUTION INTRAVENOUS at 01:08

## 2020-08-13 RX ADMIN — PANTOPRAZOLE SODIUM 40 MG: 40 INJECTION, POWDER, FOR SOLUTION INTRAVENOUS at 10:08

## 2020-08-13 RX ADMIN — SODIUM CHLORIDE 1000 ML: 0.9 INJECTION, SOLUTION INTRAVENOUS at 10:08

## 2020-08-13 RX ADMIN — PROMETHAZINE HYDROCHLORIDE 12.5 MG: 25 INJECTION INTRAMUSCULAR; INTRAVENOUS at 10:08

## 2020-08-13 NOTE — ED PROVIDER NOTES
Encounter Date: 2020       History     Chief Complaint   Patient presents with    Vomiting     SINCE MONDAY, SEEN HERE YESTERDAY FOR SAME    Nausea     S/P ENDOSCOPY MONDAY AT DR FULLER OFFICE     34 Year old female who has a history of depression, questionable IBS and anxiety, presents emergency room with a history that 4 days ago she had an EGD done by Dr. Dove and she states that after she awoke from the procedure she had significant epigastric pain.  The patient states that she has had persistent vomiting and unable to keep anything down since the procedure is done.  She denies any complaints of lower abdominal pain.  She does state that she has had some increased hiccups.  She has not had a bowel movement since the procedure.  She admits that she has had some diminished urinary output as well as having some dizziness and lightheadedness.  She also complains of some headache.  She admits some abdominal cramping.  Patient is not taking any Protonix, Nexium or Pepcid.        Review of patient's allergies indicates:  No Known Allergies  Past Medical History:   Diagnosis Date    Anxiety     Depression     Herpes     IBS (irritable bowel syndrome)     Menometrorrhagia      Past Surgical History:   Procedure Laterality Date     SECTION      COLONOSCOPY N/A 2019    Procedure: COLONOSCOPY;  Surgeon: Camacho Dove MD;  Location: [facility];  Service: Endoscopy;  Laterality: N/A;    HYSTEROSCOPY WITH DILATION AND CURETTAGE OF UTERUS N/A 2018    Procedure: HYSTEROSCOPY, WITH DILATION AND CURETTAGE OF UTERUS;  Surgeon: Gloria Umaña MD;  [facility];  Service: OB/GYN;  Laterality: N/A;    LASER ABLATION OF THE CERVIX      LIPOMA RESECTION      MYOMECTOMY N/A 2018    Procedure: MYOMECTOMY;  Surgeon: Gloria Umaña MD;  Location: [facility] OR;  Service: OB/GYN;  Laterality: N/A;  myosure REF RM- [id number]  time= 0.45  Aqulex Ref AQL-100P  [id number]      No family history on file.  Social History     Tobacco Use    Smoking status: Former Smoker     Packs/day: 1.00     Types: Cigarettes     Quit date: 2014     Years since quittin.0   Substance Use Topics    Alcohol use: Yes     Comment: socially    Drug use: Not on file     Review of Systems   Constitutional: Positive for appetite change. Negative for activity change, chills, diaphoresis and fever.   HENT: Negative.  Negative for sore throat.    Respiratory: Negative for cough, chest tightness, shortness of breath and wheezing.    Cardiovascular: Negative for chest pain and palpitations.   Gastrointestinal: Positive for abdominal pain, nausea and vomiting.   Genitourinary: Positive for decreased urine volume. Negative for dysuria.   Musculoskeletal: Negative for back pain.   Skin: Negative for rash.   Neurological: Positive for dizziness and light-headedness. Negative for weakness.   Hematological: Does not bruise/bleed easily.   All other systems reviewed and are negative.      Physical Exam     Initial Vitals [20 0922]   BP Pulse Resp Temp SpO2   134/85 91 20 98 °F (36.7 °C) 100 %      MAP       --         Physical Exam    Vitals reviewed.  Constitutional: She appears well-developed and well-nourished. She is not diaphoretic. No distress.   Patient is spitting up saliva but no actual vomitus   HENT:   Head: Normocephalic and atraumatic.   Nose: Nose normal.   Mouth/Throat: Oropharynx is clear and moist. No oropharyngeal exudate.   Eyes: Conjunctivae are normal. Pupils are equal, round, and reactive to light. Right eye exhibits no discharge. Left eye exhibits no discharge.   Neck: Normal range of motion. Neck supple. No JVD present.   Cardiovascular: Normal rate, regular rhythm, normal heart sounds and intact distal pulses. Exam reveals no gallop and no friction rub.    No murmur heard.  Pulmonary/Chest: Breath sounds normal. No respiratory distress. She has no wheezes. She has no rhonchi. She  has no rales.   Abdominal: Soft. Bowel sounds are normal. She exhibits no distension. There is abdominal tenderness. There is guarding. There is no rebound.   Musculoskeletal: Normal range of motion. No tenderness or edema.   Lymphadenopathy:     She has no cervical adenopathy.   Neurological: She is alert and oriented to person, place, and time. She has normal strength. No cranial nerve deficit or sensory deficit. GCS score is 15. GCS eye subscore is 4. GCS verbal subscore is 5. GCS motor subscore is 6.   Skin: Skin is warm and dry. Capillary refill takes less than 2 seconds. No rash noted. No erythema. No pallor.   Psychiatric: She has a normal mood and affect. Her behavior is normal. Judgment and thought content normal.         ED Course   Procedures  Labs Reviewed - No data to display       Imaging Results    None                       Attending Attestation:             Attending ED Notes:   This 34-year-old female who presented with complaints of epigastric pain after an EGD 4 days ago, had some direct tenderness to palpation epigastrium.  Workup at this time showed unremarkable chemistries except for potassium of 3 for which she received supplemental oral potassium.  The patient's lipase was normal and she had a mildly elevated white count 54499.  During the ED course I did speak to her gastroenterologist who requested the patient have a CT scan which when completed only showed some mild thickening of the a gastric body but otherwise no other acute abnormalities.  He is again contacted and suggested the patient continue her Bentyl and will be given prescriptions for Carafate, Phenergan and Protonix.  She is to see him in follow-up in the office next week.  Patient is instructed on clear liquid diet today.    Differential diagnosis includes gastric ulcer, gastritis, peptic ulcer disease, cholecystitis.                        Clinical Impression:       ICD-10-CM ICD-9-CM   1. Epigastric pain  R10.13 789.06   2.  Nausea  R11.0 787.02   3.       Hypokalemia                             Fred Soliz Jr., MD  08/13/20 1410       Fred Soliz Jr., MD  08/13/20 1430

## 2020-08-13 NOTE — FIRST PROVIDER EVALUATION
Medical screening exam completed.  I have conducted a focused provider triage encounter, findings are as follows:    Brief history of present illness:  Had endoscopy on Monday  Now has epigastric pain and vomiting Was seen at [facility] had CT and chest x-ray Continues to have pain and vomiting      There were no vitals filed for this visit.    Pertinent physical exam:  Pain epigastric region with palpation     Brief workup plan: blood work    Preliminary workup initiated; this workup will be continued and followed by the physician or advanced practice provider that is assigned to the patient when roomed.

## 2020-08-13 NOTE — ED PROVIDER NOTES
Encounter Date: 2020       History     Chief Complaint   Patient presents with    Abdominal Pain     pt c/o endoscope done on monday. reports being seen at [facility] for same complaint. denies being given prescriptions upon discharge. pt reports vomiting along with epigastric pain since monday.      HPI  Review of patient's allergies indicates:  No Known Allergies  Past Medical History:   Diagnosis Date    Anxiety     Depression     Herpes     IBS (irritable bowel syndrome)     Menometrorrhagia      Past Surgical History:   Procedure Laterality Date     SECTION      COLONOSCOPY N/A 2019    Procedure: COLONOSCOPY;  Surgeon: Camacho Dvoe MD;  Location: [facility];  Service: Endoscopy;  Laterality: N/A;    HYSTEROSCOPY WITH DILATION AND CURETTAGE OF UTERUS N/A 2018    Procedure: HYSTEROSCOPY, WITH DILATION AND CURETTAGE OF UTERUS;  Surgeon: Gloria Umaña MD;  [facility];  Service: OB/GYN;  Laterality: N/A;    LASER ABLATION OF THE CERVIX      LIPOMA RESECTION      MYOMECTOMY N/A 2018    Procedure: MYOMECTOMY;  Surgeon: Gloria Umaña MD;  [facility];  Service: OB/GYN;  Laterality: N/A;  myosure REF RM- [id number]  time= 0.45  Aqulex Ref AQL-100P  [id number]     No family history on file.  Social History     Tobacco Use    Smoking status: Former Smoker     Packs/day: 1.00     Types: Cigarettes     Quit date: 2014     Years since quittin.0   Substance Use Topics    Alcohol use: Yes     Comment: socially    Drug use: Not on file     Review of Systems    Physical Exam     Initial Vitals [204]   BP Pulse Resp Temp SpO2   (!) 154/89 93 18 98.2 °F (36.8 °C) 97 %      MAP       --         Physical Exam    ED Course   Procedures  Labs Reviewed   CBC W/ AUTO DIFFERENTIAL - Abnormal; Notable for the following components:       Result Value    WBC 13.33 (*)     RBC 5.47 (*)     Hemoglobin 16.2 (*)     Gran # (ANC) 9.0 (*)     Immature  Grans (Abs) 0.05 (*)     Mono # 1.4 (*)     All other components within normal limits   COMPREHENSIVE METABOLIC PANEL - Abnormal; Notable for the following components:    Sodium 130 (*)     Potassium 3.1 (*)     Chloride 94 (*)     CO2 22 (*)     Total Protein 8.7 (*)     Total Bilirubin 1.3 (*)     All other components within normal limits   LIPASE   POCT URINE PREGNANCY          Imaging Results    None                                          Clinical Impression:   {Add your Clinical Impression here. If you haven't documented one yet, please pend the note, finalize a Clinical Impression, and refresh your note before signing.:[id number]}          ED Disposition Condition    Eloped

## 2020-08-16 ENCOUNTER — HOSPITAL ENCOUNTER (OUTPATIENT)
Facility: HOSPITAL | Age: 35
Discharge: HOME | End: 2020-08-17
Attending: EMERGENCY MEDICINE | Admitting: STUDENT IN AN ORGANIZED HEALTH CARE EDUCATION/TRAINING PROGRAM
Payer: MEDICAID

## 2020-08-16 DIAGNOSIS — R11.2 CANNABINOID HYPEREMESIS SYNDROME: Primary | ICD-10-CM

## 2020-08-16 DIAGNOSIS — F12.90 CANNABINOID HYPEREMESIS SYNDROME: Primary | ICD-10-CM

## 2020-08-16 DIAGNOSIS — R11.2 INTRACTABLE NAUSEA AND VOMITING: ICD-10-CM

## 2020-08-16 PROBLEM — K58.9 IRRITABLE BOWEL SYNDROME: Status: ACTIVE | Noted: 2018-11-12

## 2020-08-16 LAB
ALBUMIN SERPL BCP-MCNC: 4.4 G/DL (ref 3.5–5.2)
ALP SERPL-CCNC: 61 U/L (ref 55–135)
ALT SERPL W/O P-5'-P-CCNC: 26 U/L (ref 10–44)
AMPHET+METHAMPHET UR QL: NEGATIVE
ANION GAP SERPL CALC-SCNC: 11 MMOL/L (ref 8–16)
AST SERPL-CCNC: 21 U/L (ref 10–40)
B-HCG UR QL: NEGATIVE
BACTERIA #/AREA URNS HPF: NEGATIVE /HPF
BARBITURATES UR QL SCN>200 NG/ML: NEGATIVE
BASOPHILS # BLD AUTO: 0.03 K/UL (ref 0–0.2)
BASOPHILS NFR BLD: 0.3 % (ref 0–1.9)
BENZODIAZ UR QL SCN>200 NG/ML: NORMAL
BILIRUB SERPL-MCNC: 1 MG/DL (ref 0.1–1)
BILIRUB UR QL STRIP: NEGATIVE
BUN SERPL-MCNC: 7 MG/DL (ref 6–20)
BZE UR QL SCN: NEGATIVE
CALCIUM SERPL-MCNC: 9.7 MG/DL (ref 8.7–10.5)
CANNABINOIDS UR QL SCN: NORMAL
CHLORIDE SERPL-SCNC: 103 MMOL/L (ref 95–110)
CLARITY UR: CLEAR
CO2 SERPL-SCNC: 22 MMOL/L (ref 23–29)
COLOR UR: YELLOW
CREAT SERPL-MCNC: 0.9 MG/DL (ref 0.5–1.4)
CREAT UR-MCNC: 281 MG/DL (ref 15–325)
CTP QC/QA: YES
DIFFERENTIAL METHOD: ABNORMAL
EOSINOPHIL # BLD AUTO: 0 K/UL (ref 0–0.5)
EOSINOPHIL NFR BLD: 0.1 % (ref 0–8)
ERYTHROCYTE [DISTWIDTH] IN BLOOD BY AUTOMATED COUNT: 12.4 % (ref 11.5–14.5)
EST. GFR  (AFRICAN AMERICAN): >60 ML/MIN/1.73 M^2
EST. GFR  (NON AFRICAN AMERICAN): >60 ML/MIN/1.73 M^2
ESTIMATED AVG GLUCOSE: 111 MG/DL (ref 68–131)
GLUCOSE SERPL-MCNC: 107 MG/DL (ref 70–110)
GLUCOSE UR QL STRIP: NEGATIVE
HBA1C MFR BLD HPLC: 5.5 % (ref 4.5–6.2)
HCT VFR BLD AUTO: 46.2 % (ref 37–48.5)
HGB BLD-MCNC: 16.1 G/DL (ref 12–16)
HGB UR QL STRIP: NEGATIVE
HYALINE CASTS #/AREA URNS LPF: 16 /LPF
IMM GRANULOCYTES # BLD AUTO: 0.07 K/UL (ref 0–0.04)
IMM GRANULOCYTES NFR BLD AUTO: 0.6 % (ref 0–0.5)
KETONES UR QL STRIP: ABNORMAL
LEUKOCYTE ESTERASE UR QL STRIP: NEGATIVE
LIPASE SERPL-CCNC: 48 U/L (ref 4–60)
LYMPHOCYTES # BLD AUTO: 1.7 K/UL (ref 1–4.8)
LYMPHOCYTES NFR BLD: 14.6 % (ref 18–48)
MCH RBC QN AUTO: 29.5 PG (ref 27–31)
MCHC RBC AUTO-ENTMCNC: 34.8 G/DL (ref 32–36)
MCV RBC AUTO: 85 FL (ref 82–98)
MICROSCOPIC COMMENT: ABNORMAL
MONOCYTES # BLD AUTO: 1.2 K/UL (ref 0.3–1)
MONOCYTES NFR BLD: 10.6 % (ref 4–15)
NEUTROPHILS # BLD AUTO: 8.5 K/UL (ref 1.8–7.7)
NEUTROPHILS NFR BLD: 73.8 % (ref 38–73)
NITRITE UR QL STRIP: NEGATIVE
NRBC BLD-RTO: 0 /100 WBC
OPIATES UR QL SCN: NEGATIVE
PCP UR QL SCN>25 NG/ML: NEGATIVE
PH UR STRIP: 7 [PH] (ref 5–8)
PLATELET # BLD AUTO: 325 K/UL (ref 150–350)
PMV BLD AUTO: 9.3 FL (ref 9.2–12.9)
POTASSIUM SERPL-SCNC: 3.2 MMOL/L (ref 3.5–5.1)
PROT SERPL-MCNC: 8.5 G/DL (ref 6–8.4)
PROT UR QL STRIP: ABNORMAL
RBC # BLD AUTO: 5.45 M/UL (ref 4–5.4)
RBC #/AREA URNS HPF: 1 /HPF (ref 0–4)
SARS-COV-2 RDRP RESP QL NAA+PROBE: NEGATIVE
SODIUM SERPL-SCNC: 136 MMOL/L (ref 136–145)
SP GR UR STRIP: 1.02 (ref 1–1.03)
SQUAMOUS #/AREA URNS HPF: 2 /HPF
TOXICOLOGY INFORMATION: NORMAL
URN SPEC COLLECT METH UR: ABNORMAL
UROBILINOGEN UR STRIP-ACNC: NEGATIVE EU/DL
WBC # BLD AUTO: 11.55 K/UL (ref 3.9–12.7)
WBC #/AREA URNS HPF: 1 /HPF (ref 0–5)

## 2020-08-16 PROCEDURE — 96376 TX/PRO/DX INJ SAME DRUG ADON: CPT

## 2020-08-16 PROCEDURE — 83036 HEMOGLOBIN GLYCOSYLATED A1C: CPT

## 2020-08-16 PROCEDURE — 25500020 PHARM REV CODE 255: Performed by: EMERGENCY MEDICINE

## 2020-08-16 PROCEDURE — 81025 URINE PREGNANCY TEST: CPT | Performed by: EMERGENCY MEDICINE

## 2020-08-16 PROCEDURE — G0378 HOSPITAL OBSERVATION PER HR: HCPCS

## 2020-08-16 PROCEDURE — 25000003 PHARM REV CODE 250: Performed by: STUDENT IN AN ORGANIZED HEALTH CARE EDUCATION/TRAINING PROGRAM

## 2020-08-16 PROCEDURE — 83690 ASSAY OF LIPASE: CPT

## 2020-08-16 PROCEDURE — 80053 COMPREHEN METABOLIC PANEL: CPT

## 2020-08-16 PROCEDURE — 96367 TX/PROPH/DG ADDL SEQ IV INF: CPT

## 2020-08-16 PROCEDURE — 96361 HYDRATE IV INFUSION ADD-ON: CPT

## 2020-08-16 PROCEDURE — 80307 DRUG TEST PRSMV CHEM ANLYZR: CPT

## 2020-08-16 PROCEDURE — 96375 TX/PRO/DX INJ NEW DRUG ADDON: CPT

## 2020-08-16 PROCEDURE — 99285 EMERGENCY DEPT VISIT HI MDM: CPT | Mod: 25

## 2020-08-16 PROCEDURE — C9113 INJ PANTOPRAZOLE SODIUM, VIA: HCPCS | Performed by: STUDENT IN AN ORGANIZED HEALTH CARE EDUCATION/TRAINING PROGRAM

## 2020-08-16 PROCEDURE — 96365 THER/PROPH/DIAG IV INF INIT: CPT

## 2020-08-16 PROCEDURE — 85025 COMPLETE CBC W/AUTO DIFF WBC: CPT

## 2020-08-16 PROCEDURE — 63600175 PHARM REV CODE 636 W HCPCS: Performed by: STUDENT IN AN ORGANIZED HEALTH CARE EDUCATION/TRAINING PROGRAM

## 2020-08-16 PROCEDURE — U0002 COVID-19 LAB TEST NON-CDC: HCPCS

## 2020-08-16 PROCEDURE — 63600175 PHARM REV CODE 636 W HCPCS: Performed by: EMERGENCY MEDICINE

## 2020-08-16 PROCEDURE — 81001 URINALYSIS AUTO W/SCOPE: CPT | Mod: 59

## 2020-08-16 PROCEDURE — C9113 INJ PANTOPRAZOLE SODIUM, VIA: HCPCS | Performed by: EMERGENCY MEDICINE

## 2020-08-16 PROCEDURE — 36415 COLL VENOUS BLD VENIPUNCTURE: CPT

## 2020-08-16 PROCEDURE — 25000003 PHARM REV CODE 250: Performed by: EMERGENCY MEDICINE

## 2020-08-16 RX ORDER — POTASSIUM CHLORIDE 1.5 G/1.58G
40 POWDER, FOR SOLUTION ORAL
Status: DISCONTINUED | OUTPATIENT
Start: 2020-08-16 | End: 2020-08-17 | Stop reason: HOSPADM

## 2020-08-16 RX ORDER — SODIUM CHLORIDE, SODIUM LACTATE, POTASSIUM CHLORIDE, CALCIUM CHLORIDE 600; 310; 30; 20 MG/100ML; MG/100ML; MG/100ML; MG/100ML
INJECTION, SOLUTION INTRAVENOUS CONTINUOUS
Status: DISCONTINUED | OUTPATIENT
Start: 2020-08-16 | End: 2020-08-17 | Stop reason: HOSPADM

## 2020-08-16 RX ORDER — POTASSIUM CHLORIDE 7.45 MG/ML
10 INJECTION INTRAVENOUS ONCE
Status: COMPLETED | OUTPATIENT
Start: 2020-08-16 | End: 2020-08-16

## 2020-08-16 RX ORDER — MORPHINE SULFATE 2 MG/ML
2 INJECTION, SOLUTION INTRAMUSCULAR; INTRAVENOUS EVERY 4 HOURS PRN
Status: DISCONTINUED | OUTPATIENT
Start: 2020-08-16 | End: 2020-08-17 | Stop reason: HOSPADM

## 2020-08-16 RX ORDER — LANOLIN ALCOHOL/MO/W.PET/CERES
800 CREAM (GRAM) TOPICAL
Status: DISCONTINUED | OUTPATIENT
Start: 2020-08-16 | End: 2020-08-17 | Stop reason: HOSPADM

## 2020-08-16 RX ORDER — ONDANSETRON 2 MG/ML
4 INJECTION INTRAMUSCULAR; INTRAVENOUS
Status: COMPLETED | OUTPATIENT
Start: 2020-08-16 | End: 2020-08-16

## 2020-08-16 RX ORDER — SODIUM,POTASSIUM PHOSPHATES 280-250MG
2 POWDER IN PACKET (EA) ORAL
Status: DISCONTINUED | OUTPATIENT
Start: 2020-08-16 | End: 2020-08-17 | Stop reason: HOSPADM

## 2020-08-16 RX ORDER — TALC
6 POWDER (GRAM) TOPICAL NIGHTLY PRN
Status: DISCONTINUED | OUTPATIENT
Start: 2020-08-16 | End: 2020-08-17 | Stop reason: HOSPADM

## 2020-08-16 RX ORDER — SIMETHICONE 80 MG
1 TABLET,CHEWABLE ORAL 3 TIMES DAILY PRN
Status: DISCONTINUED | OUTPATIENT
Start: 2020-08-16 | End: 2020-08-17 | Stop reason: HOSPADM

## 2020-08-16 RX ORDER — SUCRALFATE 1 G/1
1 TABLET ORAL 4 TIMES DAILY
Status: DISCONTINUED | OUTPATIENT
Start: 2020-08-16 | End: 2020-08-17 | Stop reason: HOSPADM

## 2020-08-16 RX ORDER — ACETAMINOPHEN 325 MG/1
650 TABLET ORAL EVERY 8 HOURS PRN
Status: DISCONTINUED | OUTPATIENT
Start: 2020-08-16 | End: 2020-08-17 | Stop reason: HOSPADM

## 2020-08-16 RX ORDER — AMOXICILLIN 250 MG
1 CAPSULE ORAL DAILY PRN
Status: DISCONTINUED | OUTPATIENT
Start: 2020-08-16 | End: 2020-08-17 | Stop reason: HOSPADM

## 2020-08-16 RX ORDER — ACETAMINOPHEN 325 MG/1
650 TABLET ORAL EVERY 4 HOURS PRN
Status: DISCONTINUED | OUTPATIENT
Start: 2020-08-16 | End: 2020-08-17 | Stop reason: HOSPADM

## 2020-08-16 RX ORDER — SODIUM CHLORIDE 9 MG/ML
1000 INJECTION, SOLUTION INTRAVENOUS
Status: COMPLETED | OUTPATIENT
Start: 2020-08-16 | End: 2020-08-16

## 2020-08-16 RX ORDER — PANTOPRAZOLE SODIUM 40 MG/10ML
40 INJECTION, POWDER, LYOPHILIZED, FOR SOLUTION INTRAVENOUS
Status: COMPLETED | OUTPATIENT
Start: 2020-08-16 | End: 2020-08-16

## 2020-08-16 RX ORDER — SODIUM CHLORIDE 0.9 % (FLUSH) 0.9 %
10 SYRINGE (ML) INJECTION
Status: DISCONTINUED | OUTPATIENT
Start: 2020-08-16 | End: 2020-08-17 | Stop reason: HOSPADM

## 2020-08-16 RX ORDER — PANTOPRAZOLE SODIUM 40 MG/10ML
40 INJECTION, POWDER, LYOPHILIZED, FOR SOLUTION INTRAVENOUS 2 TIMES DAILY
Status: DISCONTINUED | OUTPATIENT
Start: 2020-08-16 | End: 2020-08-17 | Stop reason: HOSPADM

## 2020-08-16 RX ORDER — DICYCLOMINE HYDROCHLORIDE 10 MG/1
10 CAPSULE ORAL
Status: DISCONTINUED | OUTPATIENT
Start: 2020-08-16 | End: 2020-08-17 | Stop reason: HOSPADM

## 2020-08-16 RX ORDER — ONDANSETRON 4 MG/1
8 TABLET, ORALLY DISINTEGRATING ORAL EVERY 8 HOURS PRN
Status: DISCONTINUED | OUTPATIENT
Start: 2020-08-16 | End: 2020-08-17 | Stop reason: HOSPADM

## 2020-08-16 RX ORDER — HYDROCODONE BITARTRATE AND ACETAMINOPHEN 5; 325 MG/1; MG/1
1 TABLET ORAL EVERY 6 HOURS PRN
Status: DISCONTINUED | OUTPATIENT
Start: 2020-08-16 | End: 2020-08-17 | Stop reason: HOSPADM

## 2020-08-16 RX ORDER — LORAZEPAM 0.5 MG/1
0.5 TABLET ORAL EVERY 6 HOURS PRN
Status: DISCONTINUED | OUTPATIENT
Start: 2020-08-16 | End: 2020-08-17 | Stop reason: HOSPADM

## 2020-08-16 RX ADMIN — PANTOPRAZOLE SODIUM 40 MG: 40 INJECTION, POWDER, FOR SOLUTION INTRAVENOUS at 11:08

## 2020-08-16 RX ADMIN — IOHEXOL 100 ML: 350 INJECTION, SOLUTION INTRAVENOUS at 09:08

## 2020-08-16 RX ADMIN — PROMETHAZINE HYDROCHLORIDE 12.5 MG: 25 INJECTION INTRAMUSCULAR; INTRAVENOUS at 09:08

## 2020-08-16 RX ADMIN — MELATONIN 6 MG: at 10:08

## 2020-08-16 RX ADMIN — SUCRALFATE 1 G: 1 TABLET ORAL at 02:08

## 2020-08-16 RX ADMIN — SUCRALFATE 1 G: 1 TABLET ORAL at 10:08

## 2020-08-16 RX ADMIN — POTASSIUM CHLORIDE 10 MEQ: 7.46 INJECTION, SOLUTION INTRAVENOUS at 11:08

## 2020-08-16 RX ADMIN — ONDANSETRON 4 MG: 2 INJECTION INTRAMUSCULAR; INTRAVENOUS at 08:08

## 2020-08-16 RX ADMIN — SODIUM CHLORIDE 1000 ML: 0.9 INJECTION, SOLUTION INTRAVENOUS at 08:08

## 2020-08-16 RX ADMIN — PROMETHAZINE HYDROCHLORIDE 6.25 MG: 25 INJECTION INTRAMUSCULAR; INTRAVENOUS at 03:08

## 2020-08-16 RX ADMIN — DICYCLOMINE HYDROCHLORIDE 10 MG: 10 CAPSULE ORAL at 08:08

## 2020-08-16 RX ADMIN — PANTOPRAZOLE SODIUM 40 MG: 40 INJECTION, POWDER, LYOPHILIZED, FOR SOLUTION INTRAVENOUS at 05:08

## 2020-08-16 RX ADMIN — LORAZEPAM 0.5 MG: 0.5 TABLET ORAL at 08:08

## 2020-08-16 RX ADMIN — ONDANSETRON 8 MG: 4 TABLET, ORALLY DISINTEGRATING ORAL at 06:08

## 2020-08-16 RX ADMIN — SODIUM CHLORIDE, SODIUM LACTATE, POTASSIUM CHLORIDE, AND CALCIUM CHLORIDE: .6; .31; .03; .02 INJECTION, SOLUTION INTRAVENOUS at 02:08

## 2020-08-16 RX ADMIN — DICYCLOMINE HYDROCHLORIDE 10 MG: 10 CAPSULE ORAL at 05:08

## 2020-08-16 RX ADMIN — LORAZEPAM 0.5 MG: 0.5 TABLET ORAL at 02:08

## 2020-08-16 NOTE — H&P
[facility] Medicine  History & Physical    Patient Name: Eva Escalera  MRN: [id number]  Admission Date: 8/16/2020  Attending Physician: Linda Ngo DO   Primary Care Provider: Nunu Galeana Jr, MD         Patient information was obtained from patient, past medical records and ER records.     Subjective:     Principal Problem:Intractable nausea and vomiting    Chief Complaint:   Chief Complaint   Patient presents with    Vomiting     for 1 week seen here recently for same        HPI: 34-year-old female with past medical history of colitis, irritable bowel syndrome, anxiety depression presented to ED complaining of abdominal pain described as located diffusely and severe and intermittent with associated nausea and vomiting started about 2 weeks ago.   Patient states that she was planning to have a liposuction done in mid September and went to her doctor's for clearance.  She states that her GI doctor recommended colonoscopy and EGD.  She states that she was admitted for colonoscopy in following procedure had nausea vomiting and abdominal pain that improved prior to discharge.  However she states that she had outpatient EGD performed and again noticed abdominal pain with associated nausea vomiting started 2 hr following EGD.  She presented to ED (8/10) at that time and was treated with antiemetics, IV fluid and CT abdomen performed that showed no acute findings.  She again returned to ED on 8/13, reporting significant epigastric pain with persistent vomiting and nausea.  Again she was treated with antiemetics including IV Haldol and IV fluids.  CT ab performed showed gastritis.  Patient was discharged home.    Today in the ED urine drug screen positive for THC, lipase within normal limits, CT abdomen no acute findings.  Spoke with Dr. Beltrán, who states he gave patient antiemetics and IV fluids but nausea vomiting persisted therefore he is recommending admission for intractable  nausea vomiting.        Liposuction in [place]   Went to GI and recommended: lower gi  hydrated      Past Medical History:   Diagnosis Date    Anxiety     Depression     Herpes     IBS (irritable bowel syndrome)     Menometrorrhagia        Past Surgical History:   Procedure Laterality Date     SECTION      COLONOSCOPY N/A 2019    Procedure: COLONOSCOPY;  Surgeon: Camacho Dove MD;  Location: [facility];  Service: Endoscopy;  Laterality: N/A;    HYSTEROSCOPY WITH DILATION AND CURETTAGE OF UTERUS N/A 2018    Procedure: HYSTEROSCOPY, WITH DILATION AND CURETTAGE OF UTERUS;  Surgeon: Gloria Umaña MD;  Location: [facility];  Service: OB/GYN;  Laterality: N/A;    LASER ABLATION OF THE CERVIX      LIPOMA RESECTION      MYOMECTOMY N/A 2018    Procedure: MYOMECTOMY;  Surgeon: Gloria Umaña MD;  Location: [facility];  Service: OB/GYN;  Laterality: N/A;  myosure REF [id number]  time= 0.45  Aqulex Ref AQL-[id number]  [id number]       Review of patient's allergies indicates:  No Known Allergies    No current facility-administered medications on file prior to encounter.      Current Outpatient Medications on File Prior to Encounter   Medication Sig    ascorbic acid, vitamin C, (VITAMIN C) 1000 MG tablet Take 1,000 mg by mouth once daily.    cetirizine (ZYRTEC) 10 MG tablet Take 10 mg by mouth once daily.    ferrous sulfate 324 mg (65 mg iron) TbEC Take 325 mg by mouth once daily.    fish oil-omega-3 fatty acids 300-1,000 mg capsule Take 1 g by mouth 2 (two) times daily.    folic acid (FOLVITE) 400 MCG tablet Take 400 mcg by mouth once daily.    LINZESS 145 mcg Cap capsule Take 145 mcg by mouth once daily.     ondansetron (ZOFRAN-ODT) 4 MG TbDL Take 1 tablet (4 mg total) by mouth every 8 (eight) hours as needed (nausea/vomiting).    pantoprazole (PROTONIX) 20 MG tablet Take 2 tablets (40 mg total) by mouth once daily.    potassium chloride (KLOR-CON) 10 MEQ TbSR Take 1  tablet (10 mEq total) by mouth once daily.    promethazine (PHENERGAN) 25 MG tablet Take 1 tablet (25 mg total) by mouth every 6 (six) hours as needed for Nausea.    sucralfate (CARAFATE) 1 gram tablet Take 1 tablet (1 g total) by mouth 4 (four) times daily.    alprazolam (XANAX) 1 MG tablet Take 1 tablet (1 mg total) by mouth 2 (two) times daily as needed for Anxiety.    betamethasone valerate 0.1% (VALISONE) 0.1 % Lotn Apply 1 application topically once daily.     cholecalciferol, vitamin D3, 1,250 mcg (50,000 unit) capsule Take 50,000 Units by mouth every Wednesday.     clotrimazole (LOTRIMIN) 1 % cream Apply 1 application topically 2 (two) times daily.     dicyclomine (BENTYL) 10 MG capsule Take 10 mg by mouth 4 (four) times daily before meals and nightly.    ergocalciferol (VITAMIN D2) 50,000 unit Cap Take 50,000 Units by mouth once daily.     ibuprofen (ADVIL,MOTRIN) 800 MG tablet Take 800 mg by mouth 3 (three) times daily.     medroxyPROGESTERone (DEPO-PROVERA) 150 mg/mL injection Inject 150 mg into the muscle every 3 (three) months.    oxyCODONE-acetaminophen (PERCOCET) 5-325 mg per tablet Take 1 tablet by mouth every 4 (four) hours as needed for Pain.    valacyclovir (VALTREX) 500 MG tablet Take 500 mg by mouth 2 (two) times daily as needed.      Family History     None        Tobacco Use    Smoking status: Former Smoker     Packs/day: 1.00     Types: Cigarettes     Quit date: 2014     Years since quittin.0   Substance and Sexual Activity    Alcohol use: Yes     Comment: socially    Drug use: Not on file    Sexual activity: Yes     Birth control/protection: Implant     Review of Systems   Constitutional: Negative for chills and fever.   HENT: Negative for congestion and rhinorrhea.    Eyes: Negative for photophobia and pain.   Respiratory: Negative for chest tightness, shortness of breath and wheezing.    Cardiovascular: Negative for chest pain and leg swelling.   Gastrointestinal:  Positive for abdominal pain, nausea and vomiting. Negative for constipation and diarrhea.   Genitourinary: Negative for dysuria, frequency, menstrual problem and urgency.   Musculoskeletal: Negative for arthralgias and myalgias.   Skin: Negative for rash.   Neurological: Negative for dizziness, weakness and headaches.   Psychiatric/Behavioral: Negative for behavioral problems. The patient is not nervous/anxious.      Objective:     Vital Signs (Most Recent):  Temp: 98.2 °F (36.8 °C) (08/16/20 0707)  Pulse: 87 (08/16/20 1130)  Resp: 18 (08/16/20 0707)  BP: 115/73 (08/16/20 1130)  SpO2: 100 % (08/16/20 1130) Vital Signs (24h Range):  Temp:  [98.2 °F (36.8 °C)] 98.2 °F (36.8 °C)  Pulse:  [] 87  Resp:  [18] 18  SpO2:  [98 %-100 %] 100 %  BP: (115-145)/() 115/73     Weight: 77.1 kg (170 lb)  Body mass index is 28.29 kg/m².    Physical Exam  Vitals signs and nursing note reviewed.   Constitutional:       Appearance: She is well-developed.   HENT:      Head: Normocephalic and atraumatic.      Nose: Nose normal.      Mouth/Throat:      Mouth: Mucous membranes are dry.   Eyes:      Conjunctiva/sclera: Conjunctivae normal.      Pupils: Pupils are equal, round, and reactive to light.   Neck:      Musculoskeletal: Normal range of motion and neck supple.      Thyroid: No thyromegaly.      Vascular: No JVD.   Cardiovascular:      Rate and Rhythm: Normal rate and regular rhythm.      Heart sounds: Normal heart sounds. No murmur. No friction rub. No gallop.    Pulmonary:      Effort: Pulmonary effort is normal.      Breath sounds: Normal breath sounds. No wheezing or rales.   Abdominal:      General: Bowel sounds are normal. There is no distension.      Palpations: Abdomen is soft.      Tenderness: There is abdominal tenderness. There is no right CVA tenderness, left CVA tenderness, guarding or rebound.   Genitourinary:     Comments: No yi    Musculoskeletal: Normal range of motion.   Skin:     General: Skin is warm  and dry.   Neurological:      General: No focal deficit present.      Mental Status: She is alert and oriented to person, place, and time.      Deep Tendon Reflexes: Reflexes are normal and symmetric.   Psychiatric:         Behavior: Behavior normal.           Significant Labs:   CBC:   Recent Labs   Lab 08/16/20  0804   WBC 11.55   HGB 16.1*   HCT 46.2        CMP:   Recent Labs   Lab 08/16/20  0804      K 3.2*      CO2 22*      BUN 7   CREATININE 0.9   CALCIUM 9.7   PROT 8.5*   ALBUMIN 4.4   BILITOT 1.0   ALKPHOS 61   AST 21   ALT 26   ANIONGAP 11   EGFRNONAA >60.0     Lipase:   Recent Labs   Lab 08/16/20  0804   LIPASE 48     Urine Studies:   Recent Labs   Lab 08/16/20  0904   COLORU Yellow   APPEARANCEUA Clear   PHUR 7.0   SPECGRAV 1.020   PROTEINUA 1+*   GLUCUA Negative   KETONESU 2+*   BILIRUBINUA Negative   OCCULTUA Negative   NITRITE Negative   UROBILINOGEN Negative   LEUKOCYTESUR Negative   RBCUA 1   WBCUA 1   BACTERIA Negative   SQUAMEPITHEL 2   HYALINECASTS 16*     UDS: + THC    All pertinent labs within the past 24 hours have been reviewed.    Significant Imaging: I have reviewed and interpreted all pertinent imaging results/findings within the past 24 hours.   CT a/p:   IMPRESSION:   1. No acute findings in the abdomen or the pelvis.     Assessment/Plan:     Active Hospital Problems    Diagnosis  POA    *Intractable nausea and vomiting [R11.2]  Yes    Irritable bowel syndrome [K58.9]  Yes    Anxiety [F41.9]  Yes    Major depressive disorder with current active episode [F32.9]  Yes      Resolved Hospital Problems   No resolved problems to display.     Intractable N/v  Hx of IBS  Urine drug screen positive THC, patient admits to recent marijuana use.  Possibly some component of cyclic vomiting syndrome., encourage hot shower.  Patient states she does not want to receive Haldol  P.r.n. antiemetics  PPI b.i.d.  IV fluids  If no improvement consider GI consult  ultrasound abdomen  limited rule out gallbladder etiology  Lipase wnl    Anxiety  MDD  Not currently on medication  May benefit from SSRI, recommend follow up with pcp      VTE Risk Mitigation (From admission, onward)         Ordered     IP VTE LOW RISK PATIENT  Once      08/16/20 7799                Linda Ngo DO  Department of Hospital Medicine   [facility]

## 2020-08-16 NOTE — SUBJECTIVE & OBJECTIVE
Past Medical History:   Diagnosis Date    Anxiety     Depression     Herpes     IBS (irritable bowel syndrome)     Menometrorrhagia        Past Surgical History:   Procedure Laterality Date     SECTION      COLONOSCOPY N/A 2019    Procedure: COLONOSCOPY;  Surgeon: Camacho Dove MD;  Location: [facility];  Service: Endoscopy;  Laterality: N/A;    HYSTEROSCOPY WITH DILATION AND CURETTAGE OF UTERUS N/A 2018    Procedure: HYSTEROSCOPY, WITH DILATION AND CURETTAGE OF UTERUS;  Surgeon: Gloria Umaña MD;  Location: [facility];  Service: OB/GYN;  Laterality: N/A;    LASER ABLATION OF THE CERVIX      LIPOMA RESECTION      MYOMECTOMY N/A 2018    Procedure: MYOMECTOMY;  Surgeon: Gloria Umaña MD;  Location: [facility];  Service: OB/GYN;  Laterality: N/A;  myosure REF RM- [id number]  time= 0.45  Aqulex Ref AQL-100P  [id number]       Review of patient's allergies indicates:  No Known Allergies    No current facility-administered medications on file prior to encounter.      Current Outpatient Medications on File Prior to Encounter   Medication Sig    ascorbic acid, vitamin C, (VITAMIN C) 1000 MG tablet Take 1,000 mg by mouth once daily.    cetirizine (ZYRTEC) 10 MG tablet Take 10 mg by mouth once daily.    ferrous sulfate 324 mg (65 mg iron) TbEC Take 325 mg by mouth once daily.    fish oil-omega-3 fatty acids 300-1,000 mg capsule Take 1 g by mouth 2 (two) times daily.    folic acid (FOLVITE) 400 MCG tablet Take 400 mcg by mouth once daily.    LINZESS 145 mcg Cap capsule Take 145 mcg by mouth once daily.     ondansetron (ZOFRAN-ODT) 4 MG TbDL Take 1 tablet (4 mg total) by mouth every 8 (eight) hours as needed (nausea/vomiting).    pantoprazole (PROTONIX) 20 MG tablet Take 2 tablets (40 mg total) by mouth once daily.    potassium chloride (KLOR-CON) 10 MEQ TbSR Take 1 tablet (10 mEq total) by mouth once daily.    promethazine (PHENERGAN) 25 MG tablet Take 1 tablet  (25 mg total) by mouth every 6 (six) hours as needed for Nausea.    sucralfate (CARAFATE) 1 gram tablet Take 1 tablet (1 g total) by mouth 4 (four) times daily.    alprazolam (XANAX) 1 MG tablet Take 1 tablet (1 mg total) by mouth 2 (two) times daily as needed for Anxiety.    betamethasone valerate 0.1% (VALISONE) 0.1 % Lotn Apply 1 application topically once daily.     cholecalciferol, vitamin D3, 1,250 mcg (50,000 unit) capsule Take 50,000 Units by mouth every Wednesday.     clotrimazole (LOTRIMIN) 1 % cream Apply 1 application topically 2 (two) times daily.     dicyclomine (BENTYL) 10 MG capsule Take 10 mg by mouth 4 (four) times daily before meals and nightly.    ergocalciferol (VITAMIN D2) 50,000 unit Cap Take 50,000 Units by mouth once daily.     ibuprofen (ADVIL,MOTRIN) 800 MG tablet Take 800 mg by mouth 3 (three) times daily.     medroxyPROGESTERone (DEPO-PROVERA) 150 mg/mL injection Inject 150 mg into the muscle every 3 (three) months.    oxyCODONE-acetaminophen (PERCOCET) 5-325 mg per tablet Take 1 tablet by mouth every 4 (four) hours as needed for Pain.    valacyclovir (VALTREX) 500 MG tablet Take 500 mg by mouth 2 (two) times daily as needed.      Family History     None        Tobacco Use    Smoking status: Former Smoker     Packs/day: 1.00     Types: Cigarettes     Quit date: 2014     Years since quittin.0   Substance and Sexual Activity    Alcohol use: Yes     Comment: socially    Drug use: Not on file    Sexual activity: Yes     Birth control/protection: Implant     Review of Systems   Constitutional: Negative for chills and fever.   HENT: Negative for congestion and rhinorrhea.    Eyes: Negative for photophobia and pain.   Respiratory: Negative for chest tightness, shortness of breath and wheezing.    Cardiovascular: Negative for chest pain and leg swelling.   Gastrointestinal: Positive for abdominal pain, nausea and vomiting. Negative for constipation and diarrhea.    Genitourinary: Negative for dysuria, frequency, menstrual problem and urgency.   Musculoskeletal: Negative for arthralgias and myalgias.   Skin: Negative for rash.   Neurological: Negative for dizziness, weakness and headaches.   Psychiatric/Behavioral: Negative for behavioral problems. The patient is not nervous/anxious.      Objective:     Vital Signs (Most Recent):  Temp: 98.2 °F (36.8 °C) (08/16/20 0707)  Pulse: 87 (08/16/20 1130)  Resp: 18 (08/16/20 0707)  BP: 115/73 (08/16/20 1130)  SpO2: 100 % (08/16/20 1130) Vital Signs (24h Range):  Temp:  [98.2 °F (36.8 °C)] 98.2 °F (36.8 °C)  Pulse:  [] 87  Resp:  [18] 18  SpO2:  [98 %-100 %] 100 %  BP: (115-145)/() 115/73     Weight: 77.1 kg (170 lb)  Body mass index is 28.29 kg/m².    Physical Exam  Vitals signs and nursing note reviewed.   Constitutional:       Appearance: She is well-developed.   HENT:      Head: Normocephalic and atraumatic.      Nose: Nose normal.      Mouth/Throat:      Mouth: Mucous membranes are dry.   Eyes:      Conjunctiva/sclera: Conjunctivae normal.      Pupils: Pupils are equal, round, and reactive to light.   Neck:      Musculoskeletal: Normal range of motion and neck supple.      Thyroid: No thyromegaly.      Vascular: No JVD.   Cardiovascular:      Rate and Rhythm: Normal rate and regular rhythm.      Heart sounds: Normal heart sounds. No murmur. No friction rub. No gallop.    Pulmonary:      Effort: Pulmonary effort is normal.      Breath sounds: Normal breath sounds. No wheezing or rales.   Abdominal:      General: Bowel sounds are normal. There is no distension.      Palpations: Abdomen is soft.      Tenderness: There is abdominal tenderness. There is no right CVA tenderness, left CVA tenderness, guarding or rebound.   Genitourinary:     Comments: No yi    Musculoskeletal: Normal range of motion.   Skin:     General: Skin is warm and dry.   Neurological:      General: No focal deficit present.      Mental Status: She  is alert and oriented to person, place, and time.      Deep Tendon Reflexes: Reflexes are normal and symmetric.   Psychiatric:         Behavior: Behavior normal.           Significant Labs:   CBC:   Recent Labs   Lab 08/16/20  0804   WBC 11.55   HGB 16.1*   HCT 46.2        CMP:   Recent Labs   Lab 08/16/20  0804      K 3.2*      CO2 22*      BUN 7   CREATININE 0.9   CALCIUM 9.7   PROT 8.5*   ALBUMIN 4.4   BILITOT 1.0   ALKPHOS 61   AST 21   ALT 26   ANIONGAP 11   EGFRNONAA >60.0     Lipase:   Recent Labs   Lab 08/16/20  0804   LIPASE 48     Urine Studies:   Recent Labs   Lab 08/16/20  0904   COLORU Yellow   APPEARANCEUA Clear   PHUR 7.0   SPECGRAV 1.020   PROTEINUA 1+*   GLUCUA Negative   KETONESU 2+*   BILIRUBINUA Negative   OCCULTUA Negative   NITRITE Negative   UROBILINOGEN Negative   LEUKOCYTESUR Negative   RBCUA 1   WBCUA 1   BACTERIA Negative   SQUAMEPITHEL 2   HYALINECASTS 16*     UDS: + THC    All pertinent labs within the past 24 hours have been reviewed.    Significant Imaging: I have reviewed and interpreted all pertinent imaging results/findings within the past 24 hours.   CT a/p:   IMPRESSION:   1. No acute findings in the abdomen or the pelvis.

## 2020-08-16 NOTE — HPI
34-year-old female with past medical history of colitis, irritable bowel syndrome, anxiety depression presented to ED complaining of abdominal pain described as located diffusely and severe and intermittent with associated nausea and vomiting started about 2 weeks ago.   Patient states that she was planning to have a liposuction done in mid September and went to her doctor's for clearance.  She states that her GI doctor recommended colonoscopy and EGD.  She states that she was admitted for colonoscopy in following procedure had nausea vomiting and abdominal pain that improved prior to discharge.  However she states that she had outpatient EGD performed and again noticed abdominal pain with associated nausea vomiting started 2 hr following EGD.  She presented to ED (8/10) at that time and was treated with antiemetics, IV fluid and CT abdomen performed that showed no acute findings.  She again returned to ED on 8/13, reporting significant epigastric pain with persistent vomiting and nausea.  Again she was treated with antiemetics including IV Haldol and IV fluids.  CT ab performed showed gastritis.  Patient was discharged home.    Today in the ED urine drug screen positive for THC, lipase within normal limits, CT abdomen no acute findings.  Spoke with Dr. Beltrán, who states he gave patient antiemetics and IV fluids but nausea vomiting persisted therefore he is recommending admission for intractable nausea vomiting.        Liposuction in [place]   Went to GI and recommended: lower gi  hydrated

## 2020-08-16 NOTE — ED PROVIDER NOTES
Encounter Date: 2020       History     Chief Complaint   Patient presents with    Vomiting     for 1 week seen here recently for same     Patient here with reported intractable vomiting for 1 week she is status post endoscopy for evaluation of intermittent abdominal pain she states that immediately upon release from her physician's office following the upper endoscopy she began having nausea vomiting abdominal pain she was seen in the emergency department at that time given medications and release she was re-evaluated here had a CT scan done which showed evidence of thickening of the stomach lining consistent with possible gastritis she was placed on Phenergan Zofran Protonix states that despite these medications she continues to vomit continues to have abdominal pain she denies any fever chills no dysuria urgency or frequency no reported chest pain the patient reports that she has had difficulty with IBS in the past but never symptoms to the severity        Review of patient's allergies indicates:  No Known Allergies  Past Medical History:   Diagnosis Date    Anxiety     Depression     Herpes     IBS (irritable bowel syndrome)     Menometrorrhagia      Past Surgical History:   Procedure Laterality Date     SECTION      COLONOSCOPY N/A 2019    Procedure: COLONOSCOPY;  Surgeon: Camacho Dove MD;  Location: [facility];  Service: Endoscopy;  Laterality: N/A;    HYSTEROSCOPY WITH DILATION AND CURETTAGE OF UTERUS N/A 2018    Procedure: HYSTEROSCOPY, WITH DILATION AND CURETTAGE OF UTERUS;  Surgeon: Gloria Umaña MD;  Location: [facility] OR;  Service: OB/GYN;  Laterality: N/A;    LASER ABLATION OF THE CERVIX      LIPOMA RESECTION      MYOMECTOMY N/A 2018    Procedure: MYOMECTOMY;  Surgeon: Gloria Umaña MD;  Location: [facility] OR;  Service: OB/GYN;  Laterality: N/A;  myosure REF RM- [id number]  time= 0.45  Aqulex Ref AQL-100P  [id number]     History reviewed. No  pertinent family history.  Social History     Tobacco Use    Smoking status: Former Smoker     Packs/day: 1.00     Types: Cigarettes     Quit date: 2014     Years since quittin.0   Substance Use Topics    Alcohol use: Yes     Comment: socially    Drug use: Not on file     Review of Systems   Constitutional: Positive for appetite change and fatigue. Negative for chills and fever.   HENT: Negative for congestion, ear pain and sore throat.    Eyes: Negative for pain.   Respiratory: Negative for cough and shortness of breath.    Cardiovascular: Negative for chest pain and leg swelling.   Gastrointestinal: Positive for abdominal pain, nausea and vomiting. Negative for blood in stool, constipation and diarrhea.   Genitourinary: Negative for dysuria, frequency and hematuria.   Musculoskeletal: Negative.    Skin: Negative for rash.   Allergic/Immunologic: Negative for immunocompromised state.   Neurological: Negative for weakness and headaches.   Hematological: Negative for adenopathy.       Physical Exam     Initial Vitals [20 0707]   BP Pulse Resp Temp SpO2   (!) 145/103 98 18 98.2 °F (36.8 °C) 98 %      MAP       --         Physical Exam    Constitutional: She appears well-developed and well-nourished. No distress.   HENT:   Head: Normocephalic and atraumatic.   Right Ear: External ear normal.   Left Ear: External ear normal.   Mouth/Throat: Oropharynx is clear and moist.   Eyes: Conjunctivae and EOM are normal. Pupils are equal, round, and reactive to light.   Neck: Normal range of motion. Neck supple.   Cardiovascular: Normal rate, regular rhythm, normal heart sounds and intact distal pulses.   Pulmonary/Chest: Breath sounds normal. No respiratory distress.   Abdominal: Soft. There is abdominal tenderness. There is guarding.   Decreased throughout   Musculoskeletal: Normal range of motion. No edema.   Neurological: She is alert and oriented to person, place, and time. GCS score is 15. GCS eye subscore  is 4. GCS verbal subscore is 5. GCS motor subscore is 6.   Skin: Skin is warm and dry. Capillary refill takes less than 2 seconds. No rash noted.   Psychiatric: She has a normal mood and affect. Her behavior is normal.         ED Course   Procedures  Labs Reviewed   CBC W/ AUTO DIFFERENTIAL - Abnormal; Notable for the following components:       Result Value    RBC 5.45 (*)     Hemoglobin 16.1 (*)     Immature Granulocytes 0.6 (*)     Gran # (ANC) 8.5 (*)     Immature Grans (Abs) 0.07 (*)     Mono # 1.2 (*)     Gran% 73.8 (*)     Lymph% 14.6 (*)     All other components within normal limits   COMPREHENSIVE METABOLIC PANEL - Abnormal; Notable for the following components:    Potassium 3.2 (*)     CO2 22 (*)     Total Protein 8.5 (*)     All other components within normal limits   URINALYSIS, REFLEX TO URINE CULTURE - Abnormal; Notable for the following components:    Protein, UA 1+ (*)     Ketones, UA 2+ (*)     All other components within normal limits    Narrative:     Specimen Source->Urine   URINALYSIS MICROSCOPIC - Abnormal; Notable for the following components:    Hyaline Casts, UA 16 (*)     All other components within normal limits    Narrative:     Specimen Source->Urine   LIPASE   DRUG SCREEN PANEL, URINE EMERGENCY   POCT URINE PREGNANCY          Imaging Results    None          Medical Decision Making:   ED Management:  Patient here with reported intractable nausea vomiting since her upper endoscopy a week ago has been seen multiple times since this without improvement in symptoms patient initially denied any substance use however there is evidence of marijuana on her urine tox repeat CT scan states today he fails show evidence of gastritis as previously documented there is no evidence of intra-abdominal free air or other pathology patient's symptoms persist in the emergency department despite all from the medications I have discussed case with Dr. Ngo who will evaluate patient in the ER for  admission                                 Clinical Impression:       ICD-10-CM ICD-9-CM   1. Intractable nausea and vomiting  R11.2 536.2                                Trev Beltrán MD  08/16/20 1143

## 2020-08-16 NOTE — ED NOTES
Pt. States that she feels a little better, stated her stomach is a little queasy but denies vomiting and pain.

## 2020-08-16 NOTE — ED NOTES
Pt. Stated she reported to the ER 8/6 or 8/7 because she became very sick and was not able to hold much down. Pt. Stated she has not been able to eat or drink much and hold it down since Saturday 8/8. Pt. Stated she had a scope done on Monday 8/10/2020 and her last BM was on 8/10/2020. Pt. Stated she has thrown up 7x since midnight (8/16/2020). Pt. Stated she took Phenergan at midnight and 0400 (8/16/2020) and Zofran at 0400 (8/16/2020).

## 2020-08-17 VITALS
BODY MASS INDEX: 26.84 KG/M2 | TEMPERATURE: 99 F | WEIGHT: 167 LBS | HEART RATE: 92 BPM | RESPIRATION RATE: 18 BRPM | SYSTOLIC BLOOD PRESSURE: 114 MMHG | HEIGHT: 66 IN | DIASTOLIC BLOOD PRESSURE: 82 MMHG | OXYGEN SATURATION: 99 %

## 2020-08-17 LAB
ANION GAP SERPL CALC-SCNC: 10 MMOL/L (ref 8–16)
ANION GAP SERPL CALC-SCNC: 9 MMOL/L (ref 8–16)
BASOPHILS # BLD AUTO: 0.04 K/UL (ref 0–0.2)
BASOPHILS NFR BLD: 0.5 % (ref 0–1.9)
BUN SERPL-MCNC: <5 MG/DL (ref 6–20)
BUN SERPL-MCNC: <5 MG/DL (ref 6–20)
CALCIUM SERPL-MCNC: 8.7 MG/DL (ref 8.7–10.5)
CALCIUM SERPL-MCNC: 9.2 MG/DL (ref 8.7–10.5)
CHLORIDE SERPL-SCNC: 104 MMOL/L (ref 95–110)
CHLORIDE SERPL-SCNC: 104 MMOL/L (ref 95–110)
CO2 SERPL-SCNC: 21 MMOL/L (ref 23–29)
CO2 SERPL-SCNC: 24 MMOL/L (ref 23–29)
CREAT SERPL-MCNC: 0.7 MG/DL (ref 0.5–1.4)
CREAT SERPL-MCNC: 0.7 MG/DL (ref 0.5–1.4)
DIFFERENTIAL METHOD: NORMAL
EOSINOPHIL # BLD AUTO: 0.1 K/UL (ref 0–0.5)
EOSINOPHIL NFR BLD: 0.9 % (ref 0–8)
ERYTHROCYTE [DISTWIDTH] IN BLOOD BY AUTOMATED COUNT: 12 % (ref 11.5–14.5)
EST. GFR  (AFRICAN AMERICAN): >60 ML/MIN/1.73 M^2
EST. GFR  (AFRICAN AMERICAN): >60 ML/MIN/1.73 M^2
EST. GFR  (NON AFRICAN AMERICAN): >60 ML/MIN/1.73 M^2
EST. GFR  (NON AFRICAN AMERICAN): >60 ML/MIN/1.73 M^2
GLUCOSE SERPL-MCNC: 77 MG/DL (ref 70–110)
GLUCOSE SERPL-MCNC: 93 MG/DL (ref 70–110)
HCT VFR BLD AUTO: 38.2 % (ref 37–48.5)
HGB BLD-MCNC: 13.4 G/DL (ref 12–16)
IMM GRANULOCYTES # BLD AUTO: 0.03 K/UL (ref 0–0.04)
IMM GRANULOCYTES NFR BLD AUTO: 0.4 % (ref 0–0.5)
LYMPHOCYTES # BLD AUTO: 2.9 K/UL (ref 1–4.8)
LYMPHOCYTES NFR BLD: 33.6 % (ref 18–48)
MAGNESIUM SERPL-MCNC: 2 MG/DL (ref 1.6–2.6)
MCH RBC QN AUTO: 30.2 PG (ref 27–31)
MCHC RBC AUTO-ENTMCNC: 35.1 G/DL (ref 32–36)
MCV RBC AUTO: 86 FL (ref 82–98)
MONOCYTES # BLD AUTO: 1 K/UL (ref 0.3–1)
MONOCYTES NFR BLD: 11.5 % (ref 4–15)
NEUTROPHILS # BLD AUTO: 4.5 K/UL (ref 1.8–7.7)
NEUTROPHILS NFR BLD: 53.1 % (ref 38–73)
NRBC BLD-RTO: 0 /100 WBC
PHOSPHATE SERPL-MCNC: 3.8 MG/DL (ref 2.7–4.5)
PLATELET # BLD AUTO: 272 K/UL (ref 150–350)
PMV BLD AUTO: 9.8 FL (ref 9.2–12.9)
POTASSIUM SERPL-SCNC: 2.9 MMOL/L (ref 3.5–5.1)
POTASSIUM SERPL-SCNC: 3 MMOL/L (ref 3.5–5.1)
RBC # BLD AUTO: 4.43 M/UL (ref 4–5.4)
SODIUM SERPL-SCNC: 135 MMOL/L (ref 136–145)
SODIUM SERPL-SCNC: 137 MMOL/L (ref 136–145)
WBC # BLD AUTO: 8.49 K/UL (ref 3.9–12.7)

## 2020-08-17 PROCEDURE — 63600175 PHARM REV CODE 636 W HCPCS: Performed by: STUDENT IN AN ORGANIZED HEALTH CARE EDUCATION/TRAINING PROGRAM

## 2020-08-17 PROCEDURE — 63600175 PHARM REV CODE 636 W HCPCS: Performed by: INTERNAL MEDICINE

## 2020-08-17 PROCEDURE — C9113 INJ PANTOPRAZOLE SODIUM, VIA: HCPCS | Performed by: STUDENT IN AN ORGANIZED HEALTH CARE EDUCATION/TRAINING PROGRAM

## 2020-08-17 PROCEDURE — 84100 ASSAY OF PHOSPHORUS: CPT

## 2020-08-17 PROCEDURE — 25000003 PHARM REV CODE 250: Performed by: STUDENT IN AN ORGANIZED HEALTH CARE EDUCATION/TRAINING PROGRAM

## 2020-08-17 PROCEDURE — 96376 TX/PRO/DX INJ SAME DRUG ADON: CPT

## 2020-08-17 PROCEDURE — 80048 BASIC METABOLIC PNL TOTAL CA: CPT | Mod: 91

## 2020-08-17 PROCEDURE — 80048 BASIC METABOLIC PNL TOTAL CA: CPT

## 2020-08-17 PROCEDURE — 85025 COMPLETE CBC W/AUTO DIFF WBC: CPT

## 2020-08-17 PROCEDURE — 96375 TX/PRO/DX INJ NEW DRUG ADDON: CPT

## 2020-08-17 PROCEDURE — 83735 ASSAY OF MAGNESIUM: CPT

## 2020-08-17 PROCEDURE — 36415 COLL VENOUS BLD VENIPUNCTURE: CPT

## 2020-08-17 PROCEDURE — G0378 HOSPITAL OBSERVATION PER HR: HCPCS

## 2020-08-17 RX ORDER — POTASSIUM CHLORIDE 7.45 MG/ML
20 INJECTION INTRAVENOUS ONCE
Status: COMPLETED | OUTPATIENT
Start: 2020-08-17 | End: 2020-08-17

## 2020-08-17 RX ORDER — PANTOPRAZOLE SODIUM 40 MG/1
40 TABLET, DELAYED RELEASE ORAL DAILY
Qty: 30 TABLET | Refills: 0 | Status: SHIPPED | OUTPATIENT
Start: 2020-08-17 | End: 2023-08-03

## 2020-08-17 RX ORDER — PANTOPRAZOLE SODIUM 40 MG/1
40 TABLET, DELAYED RELEASE ORAL DAILY
Qty: 30 TABLET | Refills: 0 | Status: SHIPPED | OUTPATIENT
Start: 2020-08-17 | End: 2020-08-17 | Stop reason: SDUPTHER

## 2020-08-17 RX ADMIN — PANTOPRAZOLE SODIUM 40 MG: 40 INJECTION, POWDER, LYOPHILIZED, FOR SOLUTION INTRAVENOUS at 05:08

## 2020-08-17 RX ADMIN — SUCRALFATE 1 G: 1 TABLET ORAL at 11:08

## 2020-08-17 RX ADMIN — SUCRALFATE 1 G: 1 TABLET ORAL at 05:08

## 2020-08-17 RX ADMIN — DICYCLOMINE HYDROCHLORIDE 10 MG: 10 CAPSULE ORAL at 11:08

## 2020-08-17 RX ADMIN — POTASSIUM CHLORIDE 20 MEQ: 7.46 INJECTION, SOLUTION INTRAVENOUS at 05:08

## 2020-08-17 RX ADMIN — DICYCLOMINE HYDROCHLORIDE 10 MG: 10 CAPSULE ORAL at 05:08

## 2020-08-17 RX ADMIN — LORAZEPAM 0.5 MG: 0.5 TABLET ORAL at 03:08

## 2020-08-17 RX ADMIN — SODIUM CHLORIDE, SODIUM LACTATE, POTASSIUM CHLORIDE, AND CALCIUM CHLORIDE: .6; .31; .03; .02 INJECTION, SOLUTION INTRAVENOUS at 01:08

## 2020-08-17 NOTE — PLAN OF CARE
Alert,sitting in bed, turkey sandwich per patient request, denies needs, reports having loose stools while up to urinate, no c/o pain. Protonix RX filled by [facility]. Voiced understanding of discharge teaching

## 2020-08-17 NOTE — PLAN OF CARE
VSS. NAD noted. Pt has not vomited all shift. She has been NPO except for medications with sips of water since midnight which completely resolved her nausea. She has had Ativan twice for anxiety and had melatonin to help her sleep as well as her scheduled medications.

## 2020-08-17 NOTE — PLAN OF CARE
Patient seen and examined this am.   Feeling better, less nausea and ab pain.   Denies vomiting  K low: repletion underway and recheck bmp in pm planned    Will advance to soft diet and if tolerating and K improved will plan to dc later today.    Linda Ngo D.O.  08/17/2020

## 2020-08-17 NOTE — DISCHARGE SUMMARY
[facility] Medicine  Discharge Summary      Patient Name: Eva Escalera  MRN: [id number]  Admission Date: 8/16/2020  Hospital Length of Stay: 0 days  Discharge Date and Time: 8/17/2020  3:50 PM  Attending Physician: Linda Ngo DO   Discharging Provider: Linda Ngo DO  Primary Care Provider: Nunu Galeana Jr, MD      HPI:   34-year-old female with past medical history of colitis, irritable bowel syndrome, anxiety depression presented to ED complaining of abdominal pain described as located diffusely and severe and intermittent with associated nausea and vomiting started about 2 weeks ago.   Patient states that she was planning to have a liposuction done in mid September and went to her doctor's for clearance.  She states that her GI doctor recommended colonoscopy and EGD.  She states that she was admitted for colonoscopy in following procedure had nausea vomiting and abdominal pain that improved prior to discharge.  However she states that she had outpatient EGD performed and again noticed abdominal pain with associated nausea vomiting started 2 hr following EGD.  She presented to ED (8/10) at that time and was treated with antiemetics, IV fluid and CT abdomen performed that showed no acute findings.  She again returned to ED on 8/13, reporting significant epigastric pain with persistent vomiting and nausea.  Again she was treated with antiemetics including IV Haldol and IV fluids.  CT ab performed showed gastritis.  Patient was discharged home.    Today in the ED urine drug screen positive for THC, lipase within normal limits, CT abdomen no acute findings.  Spoke with Dr. Beltrán, who states he gave patient antiemetics and IV fluids but nausea vomiting persisted therefore he is recommending admission for intractable nausea vomiting.        Liposuction in [place]   Went to GI and recommended: lower gi  hydrated      * No surgery found *      Hospital Course:   Patient was  "admitted for intractable n/v and found to be UTox +thc.IVF and antiemetics given. Hot showers encouraged.  Discussed with patient and she observed that n/v has been associated with marijuana use. N/v improved and patient tolerated soft diet. PPI q day for 30 days and f/u with pcp recommended.  Physical exam on day of discharge:     /82 (BP Location: Right arm, Patient Position: Sitting)   Pulse 92   Temp 98.8 °F (37.1 °C) (Oral)   Resp 18   Ht 5' 6" (1.676 m)   Wt 75.8 kg (167 lb)   SpO2 99%   Breastfeeding No   BMI 26.95 kg/m²     Gen: nad, resting comfortably in bed  CV: rrr no mrg  Resp: CTA B/l  AB: +bs soft ntnd  Skin: dry, warm      Consults:   Consults (From admission, onward)        Status Ordering Provider     Inpatient consult to Hospitalist  Once     Provider:  Sadi Ngo, SADI Larose new Assessment & Plan notes have been filed under this hospital service since the last note was generated.  Service: Hospital Medicine    Final Active Diagnoses:    Diagnosis Date Noted POA    PRINCIPAL PROBLEM:  Intractable nausea and vomiting [R11.2] 08/16/2020 Yes    Irritable bowel syndrome [K58.9] 11/12/2018 Yes    Anxiety [F41.9] 04/18/2017 Yes    Major depressive disorder with current active episode [F32.9] 04/18/2017 Yes      Problems Resolved During this Admission:       Discharged Condition: stable    Disposition: Home or Self Care    Follow Up:  Follow-up Information     Schedule an appointment as soon as possible for a visit with Nunu Galeana Jr, MD.    Specialty: Family Medicine  Why: hospital follow up  Contact information:  [address] [place] [address] [phone]                 Patient Instructions:      Diet Adult Regular   Order Comments: BRAT diet  Simple diet and advance slowly as tolerated     Notify your health care provider if you experience any of the following:  persistent nausea and vomiting or diarrhea     Notify your health care " provider if you experience any of the following:  redness, tenderness, or signs of infection (pain, swelling, redness, odor or green/yellow discharge around incision site)     Notify your health care provider if you experience any of the following:  difficulty breathing or increased cough     Notify your health care provider if you experience any of the following:  persistent dizziness, light-headedness, or visual disturbances     Activity as tolerated       Significant Diagnostic Studies: Labs:   CMP   Recent Labs   Lab 08/16/20  0804 08/17/20  0307 08/17/20  1150    135* 137   K 3.2* 2.9* 3.0*    104 104   CO2 22* 21* 24    93 77   BUN 7 <5* <5*   CREATININE 0.9 0.7 0.7   CALCIUM 9.7 8.7 9.2   PROT 8.5*  --   --    ALBUMIN 4.4  --   --    BILITOT 1.0  --   --    ALKPHOS 61  --   --    AST 21  --   --    ALT 26  --   --    ANIONGAP 11 10 9   ESTGFRAFRICA >60.0 >60.0 >60.0   EGFRNONAA >60.0 >60.0 >60.0    and All labs within the past 24 hours have been reviewed    Pending Diagnostic Studies:     None         Medications:  Reconciled Home Medications:      Medication List      START taking these medications    pantoprazole 40 MG tablet  Commonly known as: PROTONIX  Take 1 tablet (40 mg total) by mouth once daily.        CONTINUE taking these medications    cetirizine 10 MG tablet  Commonly known as: ZYRTEC  Take 10 mg by mouth once daily.     cholecalciferol (vitamin D3) 1,250 mcg (50,000 unit) capsule  Take 50,000 Units by mouth every Wednesday.     dicyclomine 10 MG capsule  Commonly known as: BENTYL  Take 10 mg by mouth 4 (four) times daily before meals and nightly.     ferrous sulfate 324 mg (65 mg iron) Tbec  Take 325 mg by mouth once daily.     fish oil-omega-3 fatty acids 300-1,000 mg capsule  Take 1 g by mouth 2 (two) times daily.     folic acid 400 MCG tablet  Commonly known as: FOLVITE  Take 400 mcg by mouth once daily.     medroxyPROGESTERone 150 mg/mL injection  Commonly known as:  DEPO-PROVERA  Inject 150 mg into the muscle every 3 (three) months.     ondansetron 4 MG Tbdl  Commonly known as: ZOFRAN-ODT  Take 1 tablet (4 mg total) by mouth every 8 (eight) hours as needed (nausea/vomiting).     potassium chloride 10 MEQ Tbsr  Commonly known as: KLOR-CON  Take 1 tablet (10 mEq total) by mouth once daily.     promethazine 25 MG tablet  Commonly known as: PHENERGAN  Take 1 tablet (25 mg total) by mouth every 6 (six) hours as needed for Nausea.     sucralfate 1 gram tablet  Commonly known as: CARAFATE  Take 1 tablet (1 g total) by mouth 4 (four) times daily.     valACYclovir 500 MG tablet  Commonly known as: VALTREX  Take 500 mg by mouth 2 (two) times daily as needed.     VITAMIN C 1000 MG tablet  Generic drug: ascorbic acid (vitamin C)  Take 1,000 mg by mouth once daily.        STOP taking these medications    ALPRAZolam 1 MG tablet  Commonly known as: XANAX     betamethasone valerate 0.1% 0.1 % Lotn  Commonly known as: VALISONE     clotrimazole 1 % cream  Commonly known as: LOTRIMIN     ibuprofen 800 MG tablet  Commonly known as: ADVIL,MOTRIN     LINZESS 145 mcg Cap capsule  Generic drug: linaCLOtide     oxyCODONE-acetaminophen 5-325 mg per tablet  Commonly known as: PERCOCET     VITAMIN D2 50,000 unit Cap  Generic drug: ergocalciferol            Indwelling Lines/Drains at time of discharge:   Lines/Drains/Airways     None                 Time spent on the discharge of patient: 32 minutes  Patient was seen and examined on the date of discharge and determined to be suitable for discharge.         Linda Ngo DO  Department [facility]  [facility]

## 2020-08-18 NOTE — PLAN OF CARE
08/18/20 0942   Final Note   Assessment Type Final Discharge Note   Anticipated Discharge Disposition Home   Post-Acute Status   Discharge Delays None known at this time

## 2020-08-27 ENCOUNTER — HOSPITAL ENCOUNTER (EMERGENCY)
Facility: HOSPITAL | Age: 35
Discharge: HOME | End: 2020-08-27
Attending: EMERGENCY MEDICINE
Payer: MEDICAID

## 2020-08-27 VITALS
HEART RATE: 99 BPM | WEIGHT: 173 LBS | BODY MASS INDEX: 27.8 KG/M2 | HEIGHT: 66 IN | RESPIRATION RATE: 20 BRPM | TEMPERATURE: 99 F | OXYGEN SATURATION: 99 % | DIASTOLIC BLOOD PRESSURE: 92 MMHG | SYSTOLIC BLOOD PRESSURE: 133 MMHG
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
## 2020-08-27 DIAGNOSIS — S09.90XA CLOSED HEAD INJURY, INITIAL ENCOUNTER: Primary | ICD-10-CM

## 2020-08-27 DIAGNOSIS — M25.511 ACUTE PAIN OF RIGHT SHOULDER: ICD-10-CM

## 2020-08-27 LAB
B-HCG UR QL: NEGATIVE
CTP QC/QA: YES

## 2020-08-27 PROCEDURE — 25000003 PHARM REV CODE 250: Performed by: EMERGENCY MEDICINE

## 2020-08-27 PROCEDURE — 81025 URINE PREGNANCY TEST: CPT | Performed by: EMERGENCY MEDICINE

## 2020-08-27 PROCEDURE — 99283 EMERGENCY DEPT VISIT LOW MDM: CPT

## 2020-08-27 RX ORDER — CYCLOBENZAPRINE HCL 10 MG
5 TABLET ORAL 3 TIMES DAILY PRN
Qty: 15 TABLET | Refills: 0 | Status: SHIPPED | OUTPATIENT
Start: 2020-08-27 | End: 2020-09-01

## 2020-08-27 RX ORDER — ACETAMINOPHEN 500 MG
1000 TABLET ORAL
Status: COMPLETED | OUTPATIENT
Start: 2020-08-27 | End: 2020-08-27

## 2020-08-27 RX ORDER — LORAZEPAM 1 MG/1
1 TABLET ORAL
Status: COMPLETED | OUTPATIENT
Start: 2020-08-27 | End: 2020-08-27

## 2020-08-27 RX ORDER — CYCLOBENZAPRINE HCL 5 MG
5 TABLET ORAL
Status: COMPLETED | OUTPATIENT
Start: 2020-08-27 | End: 2020-08-27

## 2020-08-27 RX ADMIN — CYCLOBENZAPRINE HYDROCHLORIDE 5 MG: 5 TABLET, FILM COATED ORAL at 08:08

## 2020-08-27 RX ADMIN — ACETAMINOPHEN 1000 MG: 500 TABLET, FILM COATED ORAL at 08:08

## 2020-08-27 RX ADMIN — LORAZEPAM 1 MG: 1 TABLET ORAL at 08:08

## 2020-08-28 NOTE — ED NOTES
Bed: 03  Expected date:   Expected time:   Means of arrival:   Comments:  EMS     Valorie Nielson, Patient Care Assistant  08/27/20 1922
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
## 2020-12-22 ENCOUNTER — CLINICAL SUPPORT (OUTPATIENT)
Dept: URGENT CARE | Facility: CLINIC | Age: 35
End: 2020-12-22

## 2020-12-22 PROCEDURE — 99499 UNLISTED E&M SERVICE: CPT | Mod: S$GLB,,, | Performed by: NURSE PRACTITIONER

## 2020-12-22 PROCEDURE — 99499 PR PHYSICAL - DOT/CDL: ICD-10-PCS | Mod: S$GLB,,, | Performed by: NURSE PRACTITIONER

## 2020-12-23 PROBLEM — K60.2 ANAL FISSURE: Status: ACTIVE | Noted: 2020-12-23

## 2021-03-11 ENCOUNTER — HOSPITAL ENCOUNTER (OUTPATIENT)
Dept: RADIOLOGY | Facility: HOSPITAL | Age: 36
Discharge: HOME | End: 2021-03-11
Payer: MEDICAID

## 2021-03-11 DIAGNOSIS — M25.562 LEFT KNEE PAIN: ICD-10-CM

## 2021-03-11 DIAGNOSIS — M25.532 LEFT WRIST PAIN: ICD-10-CM

## 2021-03-11 PROCEDURE — 73560 X-RAY EXAM OF KNEE 1 OR 2: CPT | Mod: TC,PO,LT

## 2021-03-11 PROCEDURE — 73110 X-RAY EXAM OF WRIST: CPT | Mod: TC,PO,LT

## 2021-04-30 ENCOUNTER — HOSPITAL ENCOUNTER (EMERGENCY)
Facility: HOSPITAL | Age: 36
Discharge: HOME | End: 2021-04-30
Attending: EMERGENCY MEDICINE
Payer: MEDICAID

## 2021-04-30 VITALS
BODY MASS INDEX: 25.83 KG/M2 | HEART RATE: 87 BPM | WEIGHT: 155 LBS | OXYGEN SATURATION: 100 % | RESPIRATION RATE: 18 BRPM | DIASTOLIC BLOOD PRESSURE: 90 MMHG | SYSTOLIC BLOOD PRESSURE: 124 MMHG | TEMPERATURE: 98 F | HEIGHT: 65 IN

## 2021-04-30 DIAGNOSIS — K61.1 PERI-RECTAL ABSCESS: Primary | ICD-10-CM

## 2021-04-30 LAB
B-HCG UR QL: NEGATIVE
BASOPHILS # BLD AUTO: 0.02 K/UL (ref 0–0.2)
BASOPHILS NFR BLD: 0.2 % (ref 0–1.9)
CRP SERPL-MCNC: 1.4 MG/L (ref 0–8.2)
CTP QC/QA: YES
DIFFERENTIAL METHOD: NORMAL
EOSINOPHIL # BLD AUTO: 0.1 K/UL (ref 0–0.5)
EOSINOPHIL NFR BLD: 0.6 % (ref 0–8)
ERYTHROCYTE [DISTWIDTH] IN BLOOD BY AUTOMATED COUNT: 14.5 % (ref 11.5–14.5)
ERYTHROCYTE [SEDIMENTATION RATE] IN BLOOD BY WESTERGREN METHOD: 11 MM/HR (ref 0–20)
HCT VFR BLD AUTO: 42.4 % (ref 37–48.5)
HGB BLD-MCNC: 14 G/DL (ref 12–16)
IMM GRANULOCYTES # BLD AUTO: 0.03 K/UL (ref 0–0.04)
IMM GRANULOCYTES NFR BLD AUTO: 0.3 % (ref 0–0.5)
LYMPHOCYTES # BLD AUTO: 2.9 K/UL (ref 1–4.8)
LYMPHOCYTES NFR BLD: 30.3 % (ref 18–48)
MCH RBC QN AUTO: 28.9 PG (ref 27–31)
MCHC RBC AUTO-ENTMCNC: 33 G/DL (ref 32–36)
MCV RBC AUTO: 88 FL (ref 82–98)
MONOCYTES # BLD AUTO: 0.9 K/UL (ref 0.3–1)
MONOCYTES NFR BLD: 9 % (ref 4–15)
NEUTROPHILS # BLD AUTO: 5.7 K/UL (ref 1.8–7.7)
NEUTROPHILS NFR BLD: 59.6 % (ref 38–73)
NRBC BLD-RTO: 0 /100 WBC
PLATELET # BLD AUTO: 304 K/UL (ref 150–450)
PMV BLD AUTO: 9.3 FL (ref 9.2–12.9)
RBC # BLD AUTO: 4.84 M/UL (ref 4–5.4)
WBC # BLD AUTO: 9.49 K/UL (ref 3.9–12.7)

## 2021-04-30 PROCEDURE — 81025 URINE PREGNANCY TEST: CPT | Performed by: PHYSICIAN ASSISTANT

## 2021-04-30 PROCEDURE — 85651 RBC SED RATE NONAUTOMATED: CPT | Performed by: PHYSICIAN ASSISTANT

## 2021-04-30 PROCEDURE — 99285 EMERGENCY DEPT VISIT HI MDM: CPT | Mod: 25

## 2021-04-30 PROCEDURE — 96374 THER/PROPH/DIAG INJ IV PUSH: CPT

## 2021-04-30 PROCEDURE — 25500020 PHARM REV CODE 255

## 2021-04-30 PROCEDURE — 86140 C-REACTIVE PROTEIN: CPT | Performed by: PHYSICIAN ASSISTANT

## 2021-04-30 PROCEDURE — 85025 COMPLETE CBC W/AUTO DIFF WBC: CPT | Performed by: PHYSICIAN ASSISTANT

## 2021-04-30 PROCEDURE — 36415 COLL VENOUS BLD VENIPUNCTURE: CPT | Performed by: PHYSICIAN ASSISTANT

## 2021-04-30 PROCEDURE — 63600175 PHARM REV CODE 636 W HCPCS: Performed by: PHYSICIAN ASSISTANT

## 2021-04-30 RX ORDER — METRONIDAZOLE 500 MG/1
500 TABLET ORAL EVERY 12 HOURS
Qty: 14 TABLET | Refills: 0 | Status: SHIPPED | OUTPATIENT
Start: 2021-04-30 | End: 2021-05-07

## 2021-04-30 RX ORDER — CIPROFLOXACIN 500 MG/1
500 TABLET ORAL 2 TIMES DAILY
Qty: 14 TABLET | Refills: 0 | Status: SHIPPED | OUTPATIENT
Start: 2021-04-30 | End: 2021-05-07

## 2021-04-30 RX ORDER — HYDROCODONE BITARTRATE AND ACETAMINOPHEN 5; 325 MG/1; MG/1
1 TABLET ORAL EVERY 6 HOURS PRN
Qty: 12 TABLET | Refills: 0 | Status: SHIPPED | OUTPATIENT
Start: 2021-04-30 | End: 2021-05-03

## 2021-04-30 RX ORDER — MUPIROCIN 20 MG/G
OINTMENT TOPICAL 3 TIMES DAILY
Qty: 30 G | Refills: 0 | Status: SHIPPED | OUTPATIENT
Start: 2021-04-30 | End: 2021-05-10

## 2021-04-30 RX ORDER — KETOROLAC TROMETHAMINE 30 MG/ML
10 INJECTION, SOLUTION INTRAMUSCULAR; INTRAVENOUS
Status: COMPLETED | OUTPATIENT
Start: 2021-04-30 | End: 2021-04-30

## 2021-04-30 RX ADMIN — IOHEXOL 75 ML: 350 INJECTION, SOLUTION INTRAVENOUS at 07:04

## 2021-04-30 RX ADMIN — KETOROLAC TROMETHAMINE 10 MG: 30 INJECTION, SOLUTION INTRAMUSCULAR; INTRAVENOUS at 07:04

## 2021-05-06 ENCOUNTER — PATIENT MESSAGE (OUTPATIENT)
Dept: RESEARCH | Facility: HOSPITAL | Age: 36
End: 2021-05-06

## 2021-06-02 PROBLEM — K60.5 ANORECTAL FISTULA: Status: ACTIVE | Noted: 2021-06-02

## 2021-06-02 PROBLEM — K60.30 ANAL FISTULA: Status: ACTIVE | Noted: 2021-06-02

## 2021-06-02 PROBLEM — K60.50 ANORECTAL FISTULA: Status: ACTIVE | Noted: 2021-06-02

## 2021-06-02 PROBLEM — K60.2 ANAL FISSURE: Status: RESOLVED | Noted: 2020-12-23 | Resolved: 2021-06-02

## 2021-06-02 PROBLEM — K60.3 ANAL FISTULA: Status: ACTIVE | Noted: 2021-06-02

## 2021-11-11 ENCOUNTER — HOSPITAL ENCOUNTER (EMERGENCY)
Facility: HOSPITAL | Age: 36
Discharge: HOME | End: 2021-11-11
Attending: EMERGENCY MEDICINE
Payer: MEDICAID

## 2021-11-11 VITALS
HEART RATE: 89 BPM | HEIGHT: 66 IN | DIASTOLIC BLOOD PRESSURE: 89 MMHG | SYSTOLIC BLOOD PRESSURE: 111 MMHG | WEIGHT: 156 LBS | TEMPERATURE: 98 F | BODY MASS INDEX: 25.07 KG/M2 | OXYGEN SATURATION: 98 % | RESPIRATION RATE: 18 BRPM

## 2021-11-11 DIAGNOSIS — S91.311A LACERATION OF RIGHT FOOT, INITIAL ENCOUNTER: Primary | ICD-10-CM

## 2021-11-11 DIAGNOSIS — S91.319A FOOT LACERATION: ICD-10-CM

## 2021-11-11 PROCEDURE — 99284 EMERGENCY DEPT VISIT MOD MDM: CPT

## 2021-11-11 PROCEDURE — 90715 TDAP VACCINE 7 YRS/> IM: CPT | Performed by: NURSE PRACTITIONER

## 2021-11-11 PROCEDURE — 12002 RPR S/N/AX/GEN/TRNK2.6-7.5CM: CPT

## 2021-11-11 PROCEDURE — 63600175 PHARM REV CODE 636 W HCPCS: Performed by: NURSE PRACTITIONER

## 2021-11-11 PROCEDURE — 25000003 PHARM REV CODE 250: Performed by: NURSE PRACTITIONER

## 2021-11-11 PROCEDURE — 90471 IMMUNIZATION ADMIN: CPT | Performed by: NURSE PRACTITIONER

## 2021-11-11 RX ORDER — NAPROXEN 250 MG/1
500 TABLET ORAL
Status: COMPLETED | OUTPATIENT
Start: 2021-11-11 | End: 2021-11-11

## 2021-11-11 RX ORDER — LIDOCAINE HYDROCHLORIDE 10 MG/ML
10 INJECTION, SOLUTION EPIDURAL; INFILTRATION; INTRACAUDAL; PERINEURAL
Status: COMPLETED | OUTPATIENT
Start: 2021-11-11 | End: 2021-11-11

## 2021-11-11 RX ORDER — BACITRACIN 500 [USP'U]/G
OINTMENT TOPICAL
Status: COMPLETED | OUTPATIENT
Start: 2021-11-11 | End: 2021-11-11

## 2021-11-11 RX ADMIN — LIDOCAINE HYDROCHLORIDE 100 MG: 10 INJECTION, SOLUTION EPIDURAL; INFILTRATION; INTRACAUDAL; PERINEURAL at 04:11

## 2021-11-11 RX ADMIN — BACITRACIN: 500 OINTMENT TOPICAL at 04:11

## 2021-11-11 RX ADMIN — CLOSTRIDIUM TETANI TOXOID ANTIGEN (FORMALDEHYDE INACTIVATED), CORYNEBACTERIUM DIPHTHERIAE TOXOID ANTIGEN (FORMALDEHYDE INACTIVATED), BORDETELLA PERTUSSIS TOXOID ANTIGEN (GLUTARALDEHYDE INACTIVATED), BORDETELLA PERTUSSIS FILAMENTOUS HEMAGGLUTININ ANTIGEN (FORMALDEHYDE INACTIVATED), BORDETELLA PERTUSSIS PERTACTIN ANTIGEN, AND BORDETELLA PERTUSSIS FIMBRIAE 2/3 ANTIGEN 0.5 ML: 5; 2; 2.5; 5; 3; 5 INJECTION, SUSPENSION INTRAMUSCULAR at 02:11

## 2021-11-11 RX ADMIN — NAPROXEN 500 MG: 250 TABLET ORAL at 03:11

## 2021-11-24 ENCOUNTER — HOSPITAL ENCOUNTER (EMERGENCY)
Facility: HOSPITAL | Age: 36
Discharge: HOME | End: 2021-11-24
Attending: EMERGENCY MEDICINE
Payer: MEDICAID

## 2021-11-24 VITALS
DIASTOLIC BLOOD PRESSURE: 72 MMHG | HEIGHT: 66 IN | SYSTOLIC BLOOD PRESSURE: 138 MMHG | TEMPERATURE: 98 F | WEIGHT: 156 LBS | RESPIRATION RATE: 18 BRPM | OXYGEN SATURATION: 100 % | BODY MASS INDEX: 25.07 KG/M2 | HEART RATE: 82 BPM

## 2021-11-24 DIAGNOSIS — Z48.02 VISIT FOR SUTURE REMOVAL: Primary | ICD-10-CM

## 2021-11-24 PROCEDURE — 99282 EMERGENCY DEPT VISIT SF MDM: CPT

## 2021-11-24 RX ORDER — CEPHALEXIN 500 MG/1
500 CAPSULE ORAL EVERY 12 HOURS
Qty: 20 CAPSULE | Refills: 0 | Status: SHIPPED | OUTPATIENT
Start: 2021-11-24 | End: 2021-11-29

## 2021-12-21 ENCOUNTER — PATIENT MESSAGE (OUTPATIENT)
Dept: ADMINISTRATIVE | Facility: CLINIC | Age: 36
End: 2021-12-21
Payer: MEDICAID

## 2021-12-21 ENCOUNTER — OFFICE VISIT (OUTPATIENT)
Dept: URGENT CARE | Facility: CLINIC | Age: 36
End: 2021-12-21
Payer: MEDICAID

## 2021-12-21 ENCOUNTER — NURSE TRIAGE (OUTPATIENT)
Dept: ADMINISTRATIVE | Facility: CLINIC | Age: 36
End: 2021-12-21
Payer: MEDICAID

## 2021-12-21 VITALS
HEART RATE: 88 BPM | HEIGHT: 65 IN | SYSTOLIC BLOOD PRESSURE: 122 MMHG | BODY MASS INDEX: 29.32 KG/M2 | WEIGHT: 176 LBS | DIASTOLIC BLOOD PRESSURE: 88 MMHG | TEMPERATURE: 100 F | RESPIRATION RATE: 14 BRPM | OXYGEN SATURATION: 97 %

## 2021-12-21 DIAGNOSIS — U07.1 COVID-19 VIRUS DETECTED: ICD-10-CM

## 2021-12-21 DIAGNOSIS — R51.9 NONINTRACTABLE HEADACHE, UNSPECIFIED CHRONICITY PATTERN, UNSPECIFIED HEADACHE TYPE: Primary | ICD-10-CM

## 2021-12-21 LAB
CTP QC/QA: YES
SARS-COV-2 RDRP RESP QL NAA+PROBE: POSITIVE

## 2021-12-21 PROCEDURE — U0002 COVID-19 LAB TEST NON-CDC: HCPCS | Mod: QW,S$GLB,, | Performed by: NURSE PRACTITIONER

## 2021-12-21 PROCEDURE — 3008F PR BODY MASS INDEX (BMI) DOCUMENTED: ICD-10-PCS | Mod: CPTII,S$GLB,, | Performed by: NURSE PRACTITIONER

## 2021-12-21 PROCEDURE — U0002: ICD-10-PCS | Mod: QW,S$GLB,, | Performed by: NURSE PRACTITIONER

## 2021-12-21 PROCEDURE — 3008F BODY MASS INDEX DOCD: CPT | Mod: CPTII,S$GLB,, | Performed by: NURSE PRACTITIONER

## 2021-12-21 PROCEDURE — 99214 OFFICE O/P EST MOD 30 MIN: CPT | Mod: S$GLB,,, | Performed by: NURSE PRACTITIONER

## 2021-12-21 PROCEDURE — 1159F PR MEDICATION LIST DOCUMENTED IN MEDICAL RECORD: ICD-10-PCS | Mod: CPTII,S$GLB,, | Performed by: NURSE PRACTITIONER

## 2021-12-21 PROCEDURE — 99214 PR OFFICE/OUTPT VISIT, EST, LEVL IV, 30-39 MIN: ICD-10-PCS | Mod: S$GLB,,, | Performed by: NURSE PRACTITIONER

## 2021-12-21 PROCEDURE — 1159F MED LIST DOCD IN RCRD: CPT | Mod: CPTII,S$GLB,, | Performed by: NURSE PRACTITIONER

## 2021-12-21 PROCEDURE — 1160F PR REVIEW ALL MEDS BY PRESCRIBER/CLIN PHARMACIST DOCUMENTED: ICD-10-PCS | Mod: CPTII,S$GLB,, | Performed by: NURSE PRACTITIONER

## 2021-12-21 PROCEDURE — 1160F RVW MEDS BY RX/DR IN RCRD: CPT | Mod: CPTII,S$GLB,, | Performed by: NURSE PRACTITIONER

## 2021-12-21 RX ORDER — CETIRIZINE HYDROCHLORIDE 10 MG/1
10 TABLET ORAL DAILY
Qty: 30 TABLET | Refills: 0 | Status: SHIPPED | OUTPATIENT
Start: 2021-12-21 | End: 2023-08-03

## 2021-12-21 RX ORDER — ONDANSETRON 4 MG/1
4 TABLET, ORALLY DISINTEGRATING ORAL EVERY 8 HOURS PRN
Qty: 20 TABLET | Refills: 0 | Status: SHIPPED | OUTPATIENT
Start: 2021-12-21 | End: 2023-08-03

## 2021-12-21 RX ORDER — GUAIFENESIN 600 MG/1
1200 TABLET, EXTENDED RELEASE ORAL 2 TIMES DAILY
Qty: 40 TABLET | Refills: 0 | Status: SHIPPED | OUTPATIENT
Start: 2021-12-21 | End: 2021-12-31

## 2021-12-21 RX ORDER — ALBUTEROL SULFATE 90 UG/1
2 AEROSOL, METERED RESPIRATORY (INHALATION) EVERY 6 HOURS PRN
Qty: 18 G | Refills: 0 | Status: SHIPPED | OUTPATIENT
Start: 2021-12-21 | End: 2023-08-03

## 2021-12-21 RX ORDER — FLUTICASONE PROPIONATE 50 MCG
1 SPRAY, SUSPENSION (ML) NASAL DAILY
Qty: 15.8 ML | Refills: 0 | Status: SHIPPED | OUTPATIENT
Start: 2021-12-21 | End: 2023-08-03

## 2021-12-21 RX ORDER — PROMETHAZINE HYDROCHLORIDE AND DEXTROMETHORPHAN HYDROBROMIDE 6.25; 15 MG/5ML; MG/5ML
5 SYRUP ORAL EVERY 4 HOURS PRN
Qty: 118 ML | Refills: 0 | Status: SHIPPED | OUTPATIENT
Start: 2021-12-21 | End: 2021-12-31

## 2021-12-21 RX ORDER — BENZONATATE 100 MG/1
100 CAPSULE ORAL 3 TIMES DAILY PRN
Qty: 30 CAPSULE | Refills: 0 | Status: SHIPPED | OUTPATIENT
Start: 2021-12-21 | End: 2021-12-31

## 2021-12-22 ENCOUNTER — NURSE TRIAGE (OUTPATIENT)
Dept: ADMINISTRATIVE | Facility: CLINIC | Age: 36
End: 2021-12-22
Payer: MEDICAID

## 2021-12-23 ENCOUNTER — NURSE TRIAGE (OUTPATIENT)
Dept: ADMINISTRATIVE | Facility: CLINIC | Age: 36
End: 2021-12-23
Payer: MEDICAID

## 2022-02-21 ENCOUNTER — HOSPITAL ENCOUNTER (EMERGENCY)
Facility: HOSPITAL | Age: 37
Discharge: HOME | End: 2022-02-21
Attending: EMERGENCY MEDICINE
Payer: MEDICAID

## 2022-02-21 VITALS
DIASTOLIC BLOOD PRESSURE: 81 MMHG | HEART RATE: 71 BPM | RESPIRATION RATE: 16 BRPM | WEIGHT: 175 LBS | SYSTOLIC BLOOD PRESSURE: 128 MMHG | OXYGEN SATURATION: 99 % | BODY MASS INDEX: 29.12 KG/M2 | TEMPERATURE: 98 F

## 2022-02-21 DIAGNOSIS — M79.606 LEG PAIN: ICD-10-CM

## 2022-02-21 PROCEDURE — 99284 EMERGENCY DEPT VISIT MOD MDM: CPT | Mod: 25

## 2022-02-21 PROCEDURE — 25000003 PHARM REV CODE 250: Performed by: EMERGENCY MEDICINE

## 2022-02-21 RX ORDER — ONDANSETRON 4 MG/1
4 TABLET, ORALLY DISINTEGRATING ORAL
Status: COMPLETED | OUTPATIENT
Start: 2022-02-21 | End: 2022-02-21

## 2022-02-21 RX ORDER — OXYCODONE AND ACETAMINOPHEN 5; 325 MG/1; MG/1
1 TABLET ORAL
Status: COMPLETED | OUTPATIENT
Start: 2022-02-21 | End: 2022-02-21

## 2022-02-21 RX ORDER — OXYCODONE AND ACETAMINOPHEN 5; 325 MG/1; MG/1
1 TABLET ORAL EVERY 4 HOURS PRN
Qty: 18 TABLET | Refills: 0 | Status: SHIPPED | OUTPATIENT
Start: 2022-02-21

## 2022-02-21 RX ADMIN — ONDANSETRON 4 MG: 4 TABLET, ORALLY DISINTEGRATING ORAL at 06:02

## 2022-02-21 RX ADMIN — OXYCODONE HYDROCHLORIDE AND ACETAMINOPHEN 1 TABLET: 5; 325 TABLET ORAL at 06:02

## 2022-02-21 NOTE — ED PROVIDER NOTES
Encounter Date: 2022       History     Chief Complaint   Patient presents with    Knee Pain     Left knee surgery on Tuesday. States pain is not getting better, states she is not taking prescribed pain meds. States knee feels hot and swollen     Patient presents complaining of left knee pain.  Patient states she had surgery on the left knee on Tuesday.  She states she is having pain to the medial quadriceps area.  She states she received a nerve block after surgery.  She denies any fever.        Review of patient's allergies indicates:   Allergen Reactions    Propofol analogues Nausea And Vomiting and Other (See Comments)     Stomach problems     Past Medical History:   Diagnosis Date    Anxiety     Autoimmune disease     SEES RHEUMATOLOGIST, positve STACIE    Depression     General anesthetics causing adverse effect in therapeutic use     allergic to propofol    Herpes     Menometrorrhagia     PONV (postoperative nausea and vomiting)      Past Surgical History:   Procedure Laterality Date    ANAL SPHINCTEROTOMY N/A 2020    Procedure: SPHINCTEROTOMY, ANAL - Fissurectomy;  Surgeon: James Tian MD;  Location: [facility];  Service: General;  Laterality: N/A;     SECTION      COLONOSCOPY N/A 2019    Procedure: COLONOSCOPY;  Surgeon: Camacho Dove MD;  Location: [facility];  Service: Endoscopy;  Laterality: N/A;    COSMETIC SURGERY  2020    liposuction, fat graft to buttocks    DIGITAL RECTAL EXAMINATION UNDER ANESTHESIA N/A 2020    Procedure: EXAM UNDER ANESTHESIA, DIGITAL, RECTUM;  Surgeon: James Tian MD;  Location: [facility] OR;  Service: General;  Laterality: N/A;    EXAMINATION UNDER ANESTHESIA N/A 2021    Procedure: EXAM UNDER ANESTHESIA;  Surgeon: James Tian MD;  Location: [facility] OR;  Service: General;  Laterality: N/A;    FISTULOTOMY N/A 2021    Procedure: FISTULOTOMY;  Surgeon: James Tian MD;  Location: [facility] OR;   Service: General;  Laterality: N/A;    HYSTEROSCOPY WITH DILATION AND CURETTAGE OF UTERUS N/A 12/17/2018    Procedure: HYSTEROSCOPY, WITH DILATION AND CURETTAGE OF UTERUS;  Surgeon: Gloria Umaña MD;  Location: [facility] OR;  Service: OB/GYN;  Laterality: N/A;    LASER ABLATION OF THE CERVIX      LIPOMA RESECTION      LIPOSUCTION OF ABDOMEN      MYOMECTOMY N/A 12/17/2018    Procedure: MYOMECTOMY;  Surgeon: Gloria Umaña MD;  Location: [facility] OR;  Service: OB/GYN;  Laterality: N/A;  myosure REF RM- [id number]  time= 0.45  Aqulex Ref AQL-100P  [id number]     Family History   Adopted: Yes     Social History     Tobacco Use    Smoking status: Current Some Day Smoker     Packs/day: 0.25     Types: Cigarettes    Smokeless tobacco: Never Used   Substance Use Topics    Alcohol use: Yes     Comment: socially    Drug use: Yes     Types: Marijuana     Review of Systems   All other systems reviewed and are negative.      Physical Exam     Initial Vitals [02/21/22 0557]   BP Pulse Resp Temp SpO2   131/83 83 16 98.2 °F (36.8 °C) 99 %      MAP       --         Physical Exam    Nursing note and vitals reviewed.  Constitutional: She appears well-developed and well-nourished.   Pleasant, polite   HENT:   Head: Normocephalic and atraumatic.   Eyes: EOM are normal.   Neck: Neck supple.   Normal range of motion.  Cardiovascular: Normal rate, regular rhythm, normal heart sounds and intact distal pulses.   Pulmonary/Chest: No respiratory distress.   Musculoskeletal:      Cervical back: Normal range of motion and neck supple.      Comments: The incisions appear clean dry and intact.  There is no erythema warmth or cellulitis to the knee.  There is decreased range of motion secondary to pain.  There is mild tenderness to the medial quadriceps area.  There is no erythema warmth or cellulitis there.  There is no abscess.     Neurological: She is alert and oriented to person, place, and time.   Skin: Skin is warm and  dry. Capillary refill takes less than 2 seconds.   Psychiatric: She has a normal mood and affect. Her behavior is normal. Judgment and thought content normal.         ED Course   Procedures  Labs Reviewed - No data to display       Imaging Results          US Lower Extremity Veins Left (Final result)  Result time 02/21/22 07:47:22    Final result by Macario Ngo MD (02/21/22 07:47:22)                 Narrative:    REASON: Pain.    FINDINGS:    Grayscale, color and spectral Doppler analysis of the left lower extremity deep venous system was performed.    There is normal compressibility, color and spectral Doppler analysis, and augmentation in the left lower extremity deep venous system.    IMPRESSION:    No DVT of the left lower extremity veins.    Electronically signed by:  Macario Ngo DO  2/21/2022 7:47 AM [facility] Workstation: [id number]                               Medications   oxyCODONE-acetaminophen 5-325 mg per tablet 1 tablet (1 tablet Oral Given 2/21/22 0644)   ondansetron disintegrating tablet 4 mg (4 mg Oral Given 2/21/22 0644)     Medical Decision Making:   Differential Diagnosis:   Infection, DVT, muscle pain, nerve irritation  ED Management:  Patient's exam is not consistent with infection or cellulitis.  There is no pus erythema warmth or cellulitis.  There is no evidence of DVT on ultrasound.  Patient was given prescription for Percocet.  She was advised follow-up with orthopedic as scheduled.  She was given detailed return precautions.                          Clinical Impression:   Final diagnoses:  [M79.606] Leg pain          ED Disposition Condition    Discharge Stable        ED Prescriptions     Medication Sig Dispense Start Date End Date Auth. Provider    oxyCODONE-acetaminophen (PERCOCET) 5-325 mg per tablet Take 1 tablet by mouth every 4 (four) hours as needed for Pain. 18 tablet 2/21/2022  Teja Burrell MD        Follow-up Information     Follow up With Specialties Details Why Contact  Info    Your orthopedic as scheduled               Teja Burrell MD  02/21/22 0812

## 2022-02-21 NOTE — ED NOTES
Pt states she had surgery on left knee Tuesday. States she has not improved and conts to have pain. States she is having pain in left medial thigh. Pt states she has not been taking pain medications prescribed. States she was told she should be off crutches in 3 days but she is not. Pt states she is concerned for infection. Incision site noted on left knee with sutures intact. No redness edema or drainage noted from area. Pt denies fever.

## 2022-04-05 DIAGNOSIS — Z98.890 HISTORY OF ARTHROSCOPY OF LEFT KNEE: Primary | ICD-10-CM

## 2022-04-07 ENCOUNTER — CLINICAL SUPPORT (OUTPATIENT)
Dept: REHABILITATION | Facility: HOSPITAL | Age: 37
End: 2022-04-07
Payer: MEDICAID

## 2022-04-07 DIAGNOSIS — R26.89 DECREASED FUNCTIONAL MOBILITY: ICD-10-CM

## 2022-04-07 DIAGNOSIS — M25.60 DECREASED RANGE OF MOTION: ICD-10-CM

## 2022-04-07 DIAGNOSIS — M62.89 MUSCLE TIGHTNESS: ICD-10-CM

## 2022-04-07 DIAGNOSIS — R53.1 DECREASED STRENGTH: ICD-10-CM

## 2022-04-07 PROCEDURE — 97110 THERAPEUTIC EXERCISES: CPT | Mod: PN

## 2022-04-07 PROCEDURE — 97161 PT EVAL LOW COMPLEX 20 MIN: CPT | Mod: PN

## 2022-04-07 NOTE — PLAN OF CARE
[facility]  Physical Therapy Initial Evaluation    Name: Eva Escalera  Clinic Number: [id number]    Therapy Diagnosis:   Encounter Diagnoses   Name Primary?    Decreased functional mobility     Decreased strength     Muscle tightness     Decreased range of motion      Physician: Mary Araiza  Physician Orders: PT Eval and Treat  Medical Diagnosis from Referral: Z98.890 (ICD-10-CM) - History of arthroscopy of left knee   Evaluation Date: 2022  Authorization Period Expiration: 2022   Plan of Care Expiration: 2022    Progress Update: 2022  FOTO: 1 / 3   Visit # / Visits authorized:        PRECAUTIONS: Standard Precautions     Time In: 0900  Time Out: 0940  Total Appointment Time (timed & untimed codes): 40 minutes    SUBJECTIVE     Chief complaint:  S/p L knee arthroscope  DOS:  2/15/2022    History of current condition:  S/p L knee arthroscope  DOS:  2/15/2022    Previous episode:  none    Aggravating Factors: walking, prolonged sitting > 10 mins,   Easing Factors:  Rest    Imaging:  See epic.    Prior Therapy: N/A  Social History: Pt lives alone;  Has stairs in her home  Occupation: Pt is a realtor.  Prior Level of Function: Independent with all ADLs  Current Level of Function: 95% of PLOF    Pain:  Current 5 /10, worst 7 /10, best 3 /10   Description: Throbbing and Sharp    Pts goals: Pt reported goal is to be able to walk without pain.    _______________________________________________________  Medical History:   Past Medical History:   Diagnosis Date    Anxiety     Autoimmune disease     SEES RHEUMATOLOGIST, positve STACIE    Depression     General anesthetics causing adverse effect in therapeutic use     allergic to propofol    Herpes     Menometrorrhagia     PONV (postoperative nausea and vomiting)        Surgical History:   Eva Escalera  has a past surgical history that includes  section; Lipoma resection; Laser  ablation of the cervix; Hysteroscopy with dilation and curettage of uterus (N/A, 12/17/2018); Myomectomy (N/A, 12/17/2018); Colonoscopy (N/A, 1/22/2019); Digital rectal examination under anesthesia (N/A, 12/23/2020); Anal sphincterotomy (N/A, 12/23/2020); Cosmetic surgery (09/23/2020); Liposuction of abdomen; Fistulotomy (N/A, 6/2/2021); and Examination under anesthesia (N/A, 6/2/2021).    Medications:   Eva has a current medication list which includes the following prescription(s): albuterol, cetirizine, cholecalciferol (vitamin d3), docusate calcium, fluticasone propionate, ibuprofen, ketorolac, linaclotide, medroxyprogesterone, ondansetron, oxycodone-acetaminophen, pantoprazole, pulse oximeter, and valacyclovir, and the following Facility-Administered Medications: lactated ringers.    Allergies:   Review of patient's allergies indicates:   Allergen Reactions    Propofol analogues Nausea And Vomiting and Other (See Comments)     Stomach problems        OBJECTIVE   (x = not tested due to pain and/or inability to obtain test position)    RANGE OF MOTION:      Hip AROM/PROM Right  4/7/2022 Left  4/7/2022 Goal   Hip Flexion (120) wfl wfl 115     Hip IR (45) wfl wfl 40     Hip ER (45) wfl wfl 40         Knee AROM/PROM Right  4/7/2022 Left  4/7/2022 Goal   Hyper - Zero - Flexion wfl 0-1-105 0-0-135       Ankle/Foot AROM/PROM Right  4/7/2022 Left  4/7/2022 Goal   Dorsiflexion (20) wfl wfl 15     Plantarflexion (50) wfl wfl 45     Great Toe Extension (35) wfl wfl 30         STRENGTH:    L/E MMT Right  4/7/2022 Goal   Hip Flexion  4/5 5/5 B    Hip Extension  4/5 5/5 B   Hip Abduction  4-/5 5/5 B   Hip IR 4/5 5/5 B   Hip ER 4/5 5/5 B   Knee Flexion 4/5 5/5 B   Knee Extension 4/5 5/5 B   Ankle DF 4/5 5/5 B   Ankle PF 4/5 5/5 B   Ankle Inversion 4/5 5/5 B   Ankle Eversion 4/5 5/5 B   Big Toe Extension 4/5 5/5 B     L/E MMT Left  4/7/2022 Goal   Hip Flexion  4-/5 5/5 B    Hip Extension  NT 5/5 B   Hip Abduction  4-/5 5/5 B    Hip IR NT 5/5 B   Hip ER NT 5/5 B   Knee Flexion NT 5/5 B   Knee Extension NT 5/5 B   Ankle DF 4/5 5/5 B   Ankle PF 4/5 5/5 B   Ankle Inversion 4/5 5/5 B   Ankle Eversion 4/5 5/5 B   Big Toe Extension 4/5 5/5 B       MUSCLE LENGTH:     Muscle Tested  Right  4/7/2022 Left   4/7/2022 Goal   Hip Flexors  decreased decreased Normal B   Hamstrings  decreased decreased Normal B   Gastrocnemius  decreased decreased Normal B   Soleus  decreased decreased Normal B     Observation:   Slight edema noted around L knee.  No ecchymosis noted.  Sensation:  Sensation is intact to light touch  Palpation:  No TTP.  Posture:  Pt presents with postural abnormalities which include: forward head, rounded shoulders and ankle/foot pronation  Gait Analysis: The patient ambulated with the following assistive device: none; the pt presents with the following gait abnormalities: decreased step length, edgard, and arm swing; increased forward flexed posture, trunk sway     Movement Analysis:   Functional Test  Outcome   Double Leg Squat 3/4 squat limited by pain and muscular tightnes   Single Leg Step Down NT           TREATMENT   Total Treatment time separate from Evaluation: (10) minutes    Eva received therapeutic exercises to develop strength, endurance, ROM, flexibility, and posture for (10) minutes including: x = exercises performed     TherEx 4/7/2022    Quad sets with towel x 3x10   SLRs x 3x10   Bridges x 3x10          Plan for Next Visit:     Recumbent bike x 5 mins  Quad sets with towel  3x10  SLRs  2x10  Bridges  3x10  Clams with red tb   3x10 B  Heel slides with strap  3x10  Seated hamstring stretch  3x30 secs  Step ups  3x10  SLS in parallel bars   3x30 secs  TKE with ball  3x10  Standing gastroc stretch  3x30 secs       Home Exercises and Patient Education Provided    Education/Self-Care provided:    Patient educated on the impairments noted above and the effects of physical therapy intervention to improve overall condition  and quality of life.    Patient was educated on all the above exercise prior/during/after for proper posture, positioning, and execution for safe performance with home exercise program.     Written Home Exercises Provided: yes. Prefers: Electronic Copies  Exercises were reviewed and Eva was able to demonstrate them prior to the end of the session.  Eva demonstrated good understanding of the education provided.     See EMR under Patient Instructions for exercises provided during therapy sessions.    ASSESSMENT   Eva is a 36 y.o. female referred to outpatient Physical Therapy with a medical diagnosis of Z98.890 (ICD-10-CM) - History of arthroscopy of left knee   . Eva presents with clinical signs and symptoms that support this diagnosis with      decreased knee and hip ROM, decreased hip girdle, quad, and hamstring Strength, patellar joint hypomobility, increased joint and soft tissue edema, and impaired functional mobility.    The above impairments will be addressed through manual therapy techniques, therapeutic exercises, functional training, and modalities as necessary. Patient was treated and educated on exercises for home program, progression of therapy, and benefits of therapy to achieve full functional mobility. Patient will benefit from skilled physical therapy to meet short and long term goals and return to prior level of function.    Pt prognosis is Good.   Pt will benefit from skilled outpatient Physical Therapy to address the deficits stated above and in the chart below, provide pt/family education, and to maximize pt's level of independence.     Plan of care discussed with patient: Yes  Pt's spiritual, cultural and educational needs considered and patient is agreeable to the plan of care and goals as stated below:     Anticipated Barriers for therapy: none    Medical Necessity is demonstrated by the following:   History  Co-morbidities and personal factors that may impact the plan of care  "Co-morbidities:   high BMI    Personal Factors:   no deficits     low   Examination  Body Structures and Functions, activity limitations and participation restrictions that may impact the plan of care Body Regions:   lower extremities    Body Systems:    gross symmetry  ROM  strength  gross coordinated movement  balance  gait  motor control  motor learning    Participation Restrictions:   See above in "Current Level of Function"     Activity limitations:   Learning and applying knowledge  no deficits    General Tasks and Commands  no deficits    Communication  no deficits    Mobility  no deficits    Self care  no deficits    Domestic Life  no deficits    Interactions/Relationships  no deficits    Life Areas  no deficits    Community and Social Life  community life  recreation and leisure  no deficits         low   Clinical Presentation stable and uncomplicated low   Decision Making/ Complexity Score: low       GOALS:  SHORT TERM GOALS: 4 weeks 4/7/2022   1. Recent signs and systems trend is improving in order to progress towards LTG's.    2. Patient will be independent with HEP in order to further progress and return to maximal function.    3. Pain rating at Worst: 5/10 in order to progress towards increased independence with activity.    4. Patient will be able to correct postural deviations in sitting and standing, to decrease pain and promote postural awareness for injury prevention.       LONG TERM GOALS: 8 weeks 4/7/2022   1. Patient will return to normal ADL, recreational, and work related activities with less pain and limitation.     2. Patient will improve AROM to stated goals in order to return to maximal functional potential.     3. Patient will improve Strength to stated goals of appropriate musculature in order to improve functional independence.     4. Pain Rating at Best: 1/10 to improve Quality of Life.     5. Patient will meet predicted functional outcome (FOTO) score: 75% to increase self-worth & " perceived functional ability.    6. Patient will have met/partially met personal goal of being able to walk with no pain          PLAN   Plan of care Certification: 4/7/2022 to 6/30/2022    Outpatient Physical Therapy 2 times weekly for 6 weeks to include any combination of the following interventions: virtual visits, dry needling, modalities, electrical stimulation (IFC, Pre-Mod, Attended with Functional Dry Needling), Manual Therapy, Moist Heat/ Ice, Neuromuscular Re-ed, Patient Education, Self Care, Therapeutic Exercise, Functional Training and Therapeutic Activites     Thank you for this referral.    These services are reasonable and necessary for the conditions set forth above while under my care.    Cruz Carlin, PT, DPT

## 2022-04-11 ENCOUNTER — CLINICAL SUPPORT (OUTPATIENT)
Dept: REHABILITATION | Facility: HOSPITAL | Age: 37
End: 2022-04-11
Payer: MEDICAID

## 2022-04-11 DIAGNOSIS — R53.1 DECREASED STRENGTH: ICD-10-CM

## 2022-04-11 DIAGNOSIS — R26.89 DECREASED FUNCTIONAL MOBILITY: Primary | ICD-10-CM

## 2022-04-11 DIAGNOSIS — M25.60 DECREASED RANGE OF MOTION: ICD-10-CM

## 2022-04-11 DIAGNOSIS — M62.89 MUSCLE TIGHTNESS: ICD-10-CM

## 2022-04-11 PROCEDURE — 97110 THERAPEUTIC EXERCISES: CPT | Mod: PN

## 2022-04-11 NOTE — PROGRESS NOTES
[facility] / [facility]  Physical Therapy Daily Treatment Note     Name: Eva Escalera  Clinic Number: [id number]    Therapy Diagnosis:   Encounter Diagnoses   Name Primary?    Decreased functional mobility Yes    Decreased strength     Muscle tightness     Decreased range of motion      Physician: Mary Araiza    Visit Date: 4/11/2022    Physician: Mary Araiza  Physician Orders: PT Eval and Treat  Medical Diagnosis from Referral: Z98.890 (ICD-10-CM) - History of arthroscopy of left knee   Evaluation Date: 4/7/2022  Authorization Period Expiration: 12/31/2022   Plan of Care Expiration: 6/30/2022                Progress Update: 5/7/2022               FOTO: 1 / 3   Visit # / Visits authorized: 1 / 1        Time In: 1345  Time Out: 1440  Total Billable Time: 55 minutes    Precautions: Standard  Insurance: Payor: MEDICAID / Plan: [place] [employer] / Product Type: Managed Medicaid /     Subjective     Pt reports: that she feels like she is getting stronger and she is not spasming as much.  She was compliant with home exercise program.  Response to previous treatment: positive  Functional change: walking better    Pain: 2/10  Location: left knee      Objective     Eva received therapeutic exercises to develop strength, endurance, ROM and flexibility for 55 minutes including:    Recumbent bike x 5 mins  Quad sets with towel  3x10  SLRs  3x10  Bridges  3x10  Clams with red tb   3x10 B  Heel slides with strap  3x10  Seated hamstring stretch  3x30 secs  Step ups  3x10  SLS in parallel bars   3x30 secs  TKE with ball  3x10  Standing gastroc stretch  3x30 secs    Home Exercises Provided and Patient Education Provided     Education provided:   - low impact cardio.    Written Home Exercises Provided: yes.  Exercises were reviewed and Eva was able to demonstrate them prior to the end of the session.  Eva demonstrated good  understanding of the education provided.     See  EMR under Patient Instructions for exercises provided prior visit.    Assessment     Presents with decreased quad facilitation on L and B hip weakness.  Will continue therapy to increase strength, proprioception and mobility.    Eva is progressing well towards her goals.   Pt prognosis is Good.     Pt will continue to benefit from skilled outpatient physical therapy to address the deficits listed in the problem list box on initial evaluation, provide pt/family education and to maximize pt's level of independence in the home and community environment.     Pt's spiritual, cultural and educational needs considered and pt agreeable to plan of care and goals.    Anticipated barriers to physical therapy: None    Goals:       SHORT TERM GOALS: 4 weeks 4/11/2022     1. Recent signs and systems trend is improving in order to progress towards LTG's. ongoing   2. Patient will be independent with HEP in order to further progress and return to maximal function. ongoing   3. Pain rating at Worst: 5/10 in order to progress towards increased independence with activity. ongoing   4. Patient will be able to correct postural deviations in sitting and standing, to decrease pain and promote postural awareness for injury prevention.  ongoing      LONG TERM GOALS: 8 weeks 4/11/2022     1. Patient will return to normal ADL, recreational, and work related activities with less pain and limitation.  ongoing   2. Patient will improve AROM to stated goals in order to return to maximal functional potential.  ongoing   3. Patient will improve Strength to stated goals of appropriate musculature in order to improve functional independence.  ongoing   4. Pain Rating at Best: 1/10 to improve Quality of Life.  ongoing   5. Patient will meet predicted functional outcome (FOTO) score: 75% to increase self-worth & perceived functional ability.  ongoing   6. Patient will have met/partially met personal goal of being able to walk with no pain ongoing          Plan     Continue POC as previously stated.    Cruz Carlin, PT

## 2022-04-18 ENCOUNTER — CLINICAL SUPPORT (OUTPATIENT)
Dept: REHABILITATION | Facility: HOSPITAL | Age: 37
End: 2022-04-18
Payer: MEDICAID

## 2022-04-18 DIAGNOSIS — R53.1 DECREASED STRENGTH: ICD-10-CM

## 2022-04-18 DIAGNOSIS — M25.60 DECREASED RANGE OF MOTION: ICD-10-CM

## 2022-04-18 DIAGNOSIS — M62.89 MUSCLE TIGHTNESS: ICD-10-CM

## 2022-04-18 DIAGNOSIS — R26.89 DECREASED FUNCTIONAL MOBILITY: Primary | ICD-10-CM

## 2022-04-18 PROCEDURE — 97110 THERAPEUTIC EXERCISES: CPT | Mod: PN

## 2022-04-18 NOTE — PROGRESS NOTES
[facility] / [facility]  Physical Therapy Daily Treatment Note     Name: Eva Escalera  Clinic Number: [id number]    Therapy Diagnosis:   Encounter Diagnoses   Name Primary?    Decreased functional mobility Yes    Decreased strength     Muscle tightness     Decreased range of motion      Physician: Mary Araiza    Visit Date: 4/18/2022    Physician: Mary Araiza  Physician Orders: PT Eval and Treat  Medical Diagnosis from Referral: Z98.890 (ICD-10-CM) - History of arthroscopy of left knee   Evaluation Date: 4/7/2022  Authorization Period Expiration: 12/31/2022   Plan of Care Expiration: 6/30/2022                Progress Update: 5/7/2022               FOTO: 1 / 3   Visit # / Visits authorized: 3/8        Time In: 0931  Time Out: 1015  Total Billable Time: 44 minutes    Precautions: Standard  Insurance: Payor: MEDICAID / Plan: [place] [employer] CONNECT / Product Type: Managed Medicaid /     Subjective     Pt reports: that she feels like she is getting stronger.     She was compliant with home exercise program.  Response to previous treatment: positive  Functional change: walking better    Pain: 2/10  Location: left knee      Objective     Eva received therapeutic exercises to develop strength, endurance, ROM and flexibility for 44 minutes including:    Recumbent bike x 5 mins  Quad sets with towel  3x10  SLRs  3x10  Bridges  3x10  Clams with red tb   3x10 B  Heel slides with strap  3x10  Seated hamstring stretch  3x30 secs  Step ups  3x10  SLS in parallel bars   3x30 secs  TKE with ball  3x10  Standing gastroc stretch  3x30 secs    Home Exercises Provided and Patient Education Provided     Education provided:   - low impact cardio.    Written Home Exercises Provided: yes.  Exercises were reviewed and Eva was able to demonstrate them prior to the end of the session.  Eva demonstrated good  understanding of the education provided.     See EMR under Patient Instructions  for exercises provided prior visit.    Assessment     Presents with improving quad facilitation on L and B hip weakness.  Will continue therapy to increase strength, proprioception and mobility.    Eva is progressing well towards her goals.   Pt prognosis is Good.     Pt will continue to benefit from skilled outpatient physical therapy to address the deficits listed in the problem list box on initial evaluation, provide pt/family education and to maximize pt's level of independence in the home and community environment.     Pt's spiritual, cultural and educational needs considered and pt agreeable to plan of care and goals.    Anticipated barriers to physical therapy: None    Goals:       SHORT TERM GOALS: 4 weeks 4/18/2022     1. Recent signs and systems trend is improving in order to progress towards LTG's. ongoing   2. Patient will be independent with HEP in order to further progress and return to maximal function. ongoing   3. Pain rating at Worst: 5/10 in order to progress towards increased independence with activity. ongoing   4. Patient will be able to correct postural deviations in sitting and standing, to decrease pain and promote postural awareness for injury prevention.  ongoing      LONG TERM GOALS: 8 weeks 4/18/2022     1. Patient will return to normal ADL, recreational, and work related activities with less pain and limitation.  ongoing   2. Patient will improve AROM to stated goals in order to return to maximal functional potential.  ongoing   3. Patient will improve Strength to stated goals of appropriate musculature in order to improve functional independence.  ongoing   4. Pain Rating at Best: 1/10 to improve Quality of Life.  ongoing   5. Patient will meet predicted functional outcome (FOTO) score: 75% to increase self-worth & perceived functional ability.  ongoing   6. Patient will have met/partially met personal goal of being able to walk with no pain ongoing         Plan     Continue POC as  previously stated.    Cruz Carlin, PT

## 2022-04-20 ENCOUNTER — CLINICAL SUPPORT (OUTPATIENT)
Dept: REHABILITATION | Facility: HOSPITAL | Age: 37
End: 2022-04-20
Payer: MEDICAID

## 2022-04-20 DIAGNOSIS — M62.89 MUSCLE TIGHTNESS: ICD-10-CM

## 2022-04-20 DIAGNOSIS — M25.60 DECREASED RANGE OF MOTION: ICD-10-CM

## 2022-04-20 DIAGNOSIS — R53.1 DECREASED STRENGTH: ICD-10-CM

## 2022-04-20 DIAGNOSIS — R26.89 DECREASED FUNCTIONAL MOBILITY: Primary | ICD-10-CM

## 2022-04-20 PROCEDURE — 97110 THERAPEUTIC EXERCISES: CPT | Mod: PN,CQ

## 2022-04-20 NOTE — PROGRESS NOTES
"[facility] / [facility]  Physical Therapy Daily Treatment Note     Name: Eva Escalera  Clinic Number: [id number]    Therapy Diagnosis:   Encounter Diagnoses   Name Primary?    Decreased functional mobility Yes    Decreased strength     Muscle tightness     Decreased range of motion      Physician: Mary Araiza    Visit Date: 4/20/2022    Physician: Mary Araiza  Physician Orders: PT Eval and Treat  Medical Diagnosis from Referral: Z98.890 (ICD-10-CM) - History of arthroscopy of left knee   Evaluation Date: 4/7/2022  Authorization Period Expiration: 12/31/2022   Plan of Care Expiration: 6/30/2022                Progress Update: 5/7/2022               FOTO: 1 / 3   Visit # / Visits authorized: 4/8        Time In: 1132- late arrival  Time Out: 1158  Total Billable Time: 24 minutes    Precautions: Standard  Insurance: Payor: MEDICAID / [place]: [place] [employer] [place] / Product Type: Managed Medicaid /     Subjective     Pt reports: she is doing well, she wants to work on getting stuff from the floor under the bed   She was compliant with home exercise program.  Response to previous treatment: sore  Functional change: walking better    Pain: 0/10  Location: left knee      Objective     Eva received therapeutic exercises to develop strength, endurance, ROM and flexibility for 24 minutes (medicaid) including:    Recumbent bike x 5 mins    Quad sets with towel  3x10 NOT PERFORMED time   SLRs  1# 3x10  Bridges  Red Theraband 3x10  Clams with red tb   3x10 B   NOT PERFORMED time   Heel slides with strap  3x10    Seated hamstring stretch  3x30 secs  NOT PERFORMED time   Step ups 6"  3x10  SLS in parallel bars   3x30 secs  NOT PERFORMED time   TKE with ball  3x10  NOT PERFORMED time   Standing gastroc stretch  3x30 secs     Home Exercises Provided and Patient Education Provided     Education provided:   - low impact cardio.    Written Home Exercises Provided: yes.  Exercises were " reviewed and Eva was able to demonstrate them prior to the end of the session.  Eva demonstrated good  understanding of the education provided.     See EMR under Patient Instructions for exercises provided prior visit.    Assessment     Eva presents with decreased L LE and hip strength, along with genu valgus.  VC to facilitate quad muscle prior to SLR.  Continued strengthening, balance to improve ADL's, functional activities.  Eva is progressing well towards her goals.   Pt prognosis is Good.     Pt will continue to benefit from skilled outpatient physical therapy to address the deficits listed in the problem list box on initial evaluation, provide pt/family education and to maximize pt's level of independence in the home and community environment.     Pt's spiritual, cultural and educational needs considered and pt agreeable to plan of care and goals.    Anticipated barriers to physical therapy: None    Goals:       SHORT TERM GOALS: 4 weeks 4/20/2022     1. Recent signs and systems trend is improving in order to progress towards LTG's. ongoing   2. Patient will be independent with HEP in order to further progress and return to maximal function. ongoing   3. Pain rating at Worst: 5/10 in order to progress towards increased independence with activity. ongoing   4. Patient will be able to correct postural deviations in sitting and standing, to decrease pain and promote postural awareness for injury prevention.  ongoing      LONG TERM GOALS: 8 weeks 4/20/2022     1. Patient will return to normal ADL, recreational, and work related activities with less pain and limitation.  ongoing   2. Patient will improve AROM to stated goals in order to return to maximal functional potential.  ongoing   3. Patient will improve Strength to stated goals of appropriate musculature in order to improve functional independence.  ongoing   4. Pain Rating at Best: 1/10 to improve Quality of Life.  ongoing   5. Patient will meet  predicted functional outcome (FOTO) score: 75% to increase self-worth & perceived functional ability.  ongoing   6. Patient will have met/partially met personal goal of being able to walk with no pain ongoing         Plan     Continue POC as previously stated.    Gabby Godoy, PTA

## 2022-04-26 ENCOUNTER — CLINICAL SUPPORT (OUTPATIENT)
Dept: REHABILITATION | Facility: HOSPITAL | Age: 37
End: 2022-04-26
Payer: MEDICAID

## 2022-04-26 DIAGNOSIS — M62.89 MUSCLE TIGHTNESS: ICD-10-CM

## 2022-04-26 DIAGNOSIS — R53.1 DECREASED STRENGTH: ICD-10-CM

## 2022-04-26 DIAGNOSIS — M25.60 DECREASED RANGE OF MOTION: ICD-10-CM

## 2022-04-26 DIAGNOSIS — R26.89 DECREASED FUNCTIONAL MOBILITY: Primary | ICD-10-CM

## 2022-04-26 PROCEDURE — 97110 THERAPEUTIC EXERCISES: CPT | Mod: PN

## 2022-04-26 NOTE — PROGRESS NOTES
"[facility] / [facility]  Physical Therapy Daily Treatment Note     Name: Eva Escalera  Clinic Number: [id number]    Therapy Diagnosis:   Encounter Diagnoses   Name Primary?    Decreased functional mobility Yes    Decreased strength     Muscle tightness     Decreased range of motion      Physician: Mary Araiza    Visit Date: 4/26/2022    Physician: Mary Araiza  Physician Orders: PT Eval and Treat  Medical Diagnosis from Referral: Z98.890 (ICD-10-CM) - History of arthroscopy of left knee   Evaluation Date: 4/7/2022  Authorization Period Expiration: 12/31/2022   Plan of Care Expiration: 6/30/2022                Progress Update: 5/7/2022               FOTO: 1 / 3   Visit # / Visits authorized: 4/8        Time In: 1237  (Pnt arrived late)  Time Out: 1315  Total Billable Time: 38 minutes    Precautions: Standard  Insurance: Payor: MEDICAID / Plan: [employer] CONNECT / Product Type: Managed Medicaid /     Subjective     Pt reports: she has been in some significant pain the last few days.  States that she has a burning sensation in her knee.    She was compliant with home exercise program.  Response to previous treatment: sore  Functional change: walking better    Pain: 0/10  Location: left knee      Objective     Eva received therapeutic exercises to develop strength, endurance, ROM and flexibility for 38 minutes (medicaid) including:    Recumbent bike x 5 mins    Quad sets with towel  3x10  SLRs  1# 3x10  Bridges  Red Theraband 2x10  Clams with red tb   3x10 B     Heel slides with strap  3x10 - NP  SAQ  3#  3x10    Seated hamstring stretch  3x30 secs    Step ups 6"  3x10  SLS in parallel bars   3x30 secs    TKE with ball  3x10    Standing gastroc stretch  3x30 secs     Home Exercises Provided and Patient Education Provided     Education provided:   - low impact cardio.    Written Home Exercises Provided: yes.  Exercises were reviewed and Eva was able to " demonstrate them prior to the end of the session.  Eva demonstrated good  understanding of the education provided.     See EMR under Patient Instructions for exercises provided prior visit.    Assessment     Eva presents with decreased L LE and hip strength, along with genu valgus.  VC to facilitate quad muscle prior to SLR.  Continued strengthening, balance to improve ADL's, functional activities.  Eva is progressing well towards her goals.   Pt prognosis is Good.     Pt will continue to benefit from skilled outpatient physical therapy to address the deficits listed in the problem list box on initial evaluation, provide pt/family education and to maximize pt's level of independence in the home and community environment.     Pt's spiritual, cultural and educational needs considered and pt agreeable to plan of care and goals.    Anticipated barriers to physical therapy: None    Goals:       SHORT TERM GOALS: 4 weeks 4/26/2022     1. Recent signs and systems trend is improving in order to progress towards LTG's. ongoing   2. Patient will be independent with HEP in order to further progress and return to maximal function. ongoing   3. Pain rating at Worst: 5/10 in order to progress towards increased independence with activity. ongoing   4. Patient will be able to correct postural deviations in sitting and standing, to decrease pain and promote postural awareness for injury prevention.  ongoing      LONG TERM GOALS: 8 weeks 4/26/2022     1. Patient will return to normal ADL, recreational, and work related activities with less pain and limitation.  ongoing   2. Patient will improve AROM to stated goals in order to return to maximal functional potential.  ongoing   3. Patient will improve Strength to stated goals of appropriate musculature in order to improve functional independence.  ongoing   4. Pain Rating at Best: 1/10 to improve Quality of Life.  ongoing   5. Patient will meet predicted functional outcome  (FOTO) score: 75% to increase self-worth & perceived functional ability.  ongoing   6. Patient will have met/partially met personal goal of being able to walk with no pain ongoing         Plan     Continue POC as previously stated.    Cruz Carlin, PT

## 2022-04-28 ENCOUNTER — CLINICAL SUPPORT (OUTPATIENT)
Dept: REHABILITATION | Facility: HOSPITAL | Age: 37
End: 2022-04-28
Payer: MEDICAID

## 2022-04-28 DIAGNOSIS — R26.89 DECREASED FUNCTIONAL MOBILITY: Primary | ICD-10-CM

## 2022-04-28 DIAGNOSIS — M62.89 MUSCLE TIGHTNESS: ICD-10-CM

## 2022-04-28 DIAGNOSIS — M25.60 DECREASED RANGE OF MOTION: ICD-10-CM

## 2022-04-28 DIAGNOSIS — R53.1 DECREASED STRENGTH: ICD-10-CM

## 2022-04-28 PROCEDURE — 97110 THERAPEUTIC EXERCISES: CPT | Mod: PN

## 2022-04-28 NOTE — PROGRESS NOTES
"[facility] / [facility]  Physical Therapy Daily Treatment Note     Name: Eva Escalera  Clinic Number: [id number]    Therapy Diagnosis:   Encounter Diagnoses   Name Primary?    Decreased functional mobility Yes    Decreased strength     Muscle tightness     Decreased range of motion      Physician: Mary Araiza    Visit Date: 4/28/2022    Physician: Mary Araiza  Physician Orders: PT Eval and Treat  Medical Diagnosis from Referral: Z98.890 (ICD-10-CM) - History of arthroscopy of left knee   Evaluation Date: 4/7/2022  Authorization Period Expiration: 12/31/2022   Plan of Care Expiration: 6/30/2022                Progress Update: 5/7/2022               FOTO: 1 / 3   Visit # / Visits authorized: 5/8        Time In: 0953  (Pnt arrived late)  Time Out: 1031  Total Billable Time: 38 minutes    Precautions: Standard  Insurance: Payor: MEDICAID / Plan: [place] [employer] CONNECT / Product Type: Managed Medicaid /     Subjective     Pt reports: she was able to get some percocet yesterday which has helped her pain.    She was compliant with home exercise program.  Response to previous treatment: sore  Functional change: walking better    Pain: 4/10  Location: left knee      Objective     Eva received therapeutic exercises to develop strength, endurance, ROM and flexibility for 38 minutes (medicaid) including:    Recumbent bike x 5 mins    Quad sets with towel  3x10  SLRs  1# 3x10  Bridges  Red Theraband 2x10  Clams with red tb   3x10 B     Hip adduction with ball  3x10  SAQ  3#  3x10    Seated hamstring stretch  3x30 secs    Step ups 6"  3x10  SLS in parallel bars   3x30 secs    TKE with ball  3x10    Standing gastroc stretch  3x30 secs     Home Exercises Provided and Patient Education Provided     Education provided:   - low impact cardio.    Written Home Exercises Provided: yes.  Exercises were reviewed and Eva was able to demonstrate them prior to the end of the session.  " Eva demonstrated good  understanding of the education provided.     See EMR under Patient Instructions for exercises provided prior visit.    Assessment     Eva presents with decreased L LE and hip strength, along with genu valgus.  VC to facilitate quad muscle prior to SLR.  Continued strengthening, balance to improve ADL's, functional activities.  Eva is progressing well towards her goals.   Pt prognosis is Good.     Pt will continue to benefit from skilled outpatient physical therapy to address the deficits listed in the problem list box on initial evaluation, provide pt/family education and to maximize pt's level of independence in the home and community environment.     Pt's spiritual, cultural and educational needs considered and pt agreeable to plan of care and goals.    Anticipated barriers to physical therapy: None    Goals:       SHORT TERM GOALS: 4 weeks 4/28/2022     1. Recent signs and systems trend is improving in order to progress towards LTG's. ongoing   2. Patient will be independent with HEP in order to further progress and return to maximal function. ongoing   3. Pain rating at Worst: 5/10 in order to progress towards increased independence with activity. ongoing   4. Patient will be able to correct postural deviations in sitting and standing, to decrease pain and promote postural awareness for injury prevention.  ongoing      LONG TERM GOALS: 8 weeks 4/28/2022     1. Patient will return to normal ADL, recreational, and work related activities with less pain and limitation.  ongoing   2. Patient will improve AROM to stated goals in order to return to maximal functional potential.  ongoing   3. Patient will improve Strength to stated goals of appropriate musculature in order to improve functional independence.  ongoing   4. Pain Rating at Best: 1/10 to improve Quality of Life.  ongoing   5. Patient will meet predicted functional outcome (FOTO) score: 75% to increase self-worth & perceived  functional ability.  ongoing   6. Patient will have met/partially met personal goal of being able to walk with no pain ongoing         Plan     Continue POC as previously stated.    Cruz Carlin, PT

## 2022-05-02 ENCOUNTER — CLINICAL SUPPORT (OUTPATIENT)
Dept: REHABILITATION | Facility: HOSPITAL | Age: 37
End: 2022-05-02
Payer: MEDICAID

## 2022-05-02 DIAGNOSIS — M62.89 MUSCLE TIGHTNESS: ICD-10-CM

## 2022-05-02 DIAGNOSIS — M25.60 DECREASED RANGE OF MOTION: ICD-10-CM

## 2022-05-02 DIAGNOSIS — R53.1 DECREASED STRENGTH: ICD-10-CM

## 2022-05-02 DIAGNOSIS — R26.89 DECREASED FUNCTIONAL MOBILITY: Primary | ICD-10-CM

## 2022-05-02 PROCEDURE — 97110 THERAPEUTIC EXERCISES: CPT | Mod: PN

## 2022-05-02 NOTE — PROGRESS NOTES
"[facility]  Physical Therapy Daily Treatment Note     Name: Eva Escalera  Clinic Number: [id number]    Therapy Diagnosis:   Encounter Diagnoses   Name Primary?    Decreased functional mobility Yes    Decreased strength     Muscle tightness     Decreased range of motion      Physician: Mary Araiza    Visit Date: 5/2/2022    Physician: Mary Araiza  Physician Orders: PT Eval and Treat  Medical Diagnosis from Referral: Z98.890 (ICD-10-CM) - History of arthroscopy of left knee   Evaluation Date: 4/7/2022  Authorization Period Expiration: 12/31/2022   Plan of Care Expiration: 6/30/2022                Progress Update: 5/7/2022               FOTO: 1 / 3   Visit # / Visits authorized: 6/8        Time In: 0933  Time Out: 1026  Total Billable Time: 53 minutes    Precautions: Standard  Insurance: Payor: MEDICAID / Plan: [place] [employer] CONNECT / Product Type: Managed Medicaid /     Subjective     Pt reports: that she is really sore today.    She was compliant with home exercise program.  Response to previous treatment: sore  Functional change: walking better    Pain: 6/10  Location: left knee      Objective     Eva received therapeutic exercises to develop strength, endurance, ROM and flexibility for 53 minutes (medicaid) including:    Recumbent bike x 5 mins    Quad sets with towel  3x10  SLRs  1# 3x10  Bridges green theraband 2x10  Clams with green tb   3x10 B     Hip adduction with ball  3x10  SAQ  3#  3x10    Seated hamstring stretch  3x30 secs    Step ups 6"  3x10  SLS in parallel bars   3x30 secs    TKE with ball  3x10    Standing gastroc stretch  3x30 secs     Home Exercises Provided and Patient Education Provided     Education provided:   - low impact cardio.    Written Home Exercises Provided: yes.  Exercises were reviewed and Eva was able to demonstrate them prior to the end of the session.  Eva demonstrated good  understanding of the education " provided.     See EMR under Patient Instructions for exercises provided prior visit.    Assessment     Eva presents with decreased L LE and hip strength, along with genu valgus.  VC to facilitate quad muscle prior to SLR.  Continued strengthening, balance to improve ADL's, functional activities.  Eva is progressing well towards her goals.   Pt prognosis is Good.     Pt will continue to benefit from skilled outpatient physical therapy to address the deficits listed in the problem list box on initial evaluation, provide pt/family education and to maximize pt's level of independence in the home and community environment.     Pt's spiritual, cultural and educational needs considered and pt agreeable to plan of care and goals.    Anticipated barriers to physical therapy: None    Goals:       SHORT TERM GOALS: 4 weeks 5/2/2022     1. Recent signs and systems trend is improving in order to progress towards LTG's. ongoing   2. Patient will be independent with HEP in order to further progress and return to maximal function. ongoing   3. Pain rating at Worst: 5/10 in order to progress towards increased independence with activity. ongoing   4. Patient will be able to correct postural deviations in sitting and standing, to decrease pain and promote postural awareness for injury prevention.  ongoing      LONG TERM GOALS: 8 weeks 5/2/2022     1. Patient will return to normal ADL, recreational, and work related activities with less pain and limitation.  ongoing   2. Patient will improve AROM to stated goals in order to return to maximal functional potential.  ongoing   3. Patient will improve Strength to stated goals of appropriate musculature in order to improve functional independence.  ongoing   4. Pain Rating at Best: 1/10 to improve Quality of Life.  ongoing   5. Patient will meet predicted functional outcome (FOTO) score: 75% to increase self-worth & perceived functional ability.  ongoing   6. Patient will have  met/partially met personal goal of being able to walk with no pain ongoing         Plan     Continue POC as previously stated.    Cruz Carlin, PT

## 2022-05-11 ENCOUNTER — CLINICAL SUPPORT (OUTPATIENT)
Dept: REHABILITATION | Facility: HOSPITAL | Age: 37
End: 2022-05-11
Payer: MEDICAID

## 2022-05-11 DIAGNOSIS — M62.89 MUSCLE TIGHTNESS: ICD-10-CM

## 2022-05-11 DIAGNOSIS — M25.60 DECREASED RANGE OF MOTION: ICD-10-CM

## 2022-05-11 DIAGNOSIS — R53.1 DECREASED STRENGTH: ICD-10-CM

## 2022-05-11 DIAGNOSIS — R26.89 DECREASED FUNCTIONAL MOBILITY: Primary | ICD-10-CM

## 2022-05-11 PROCEDURE — 97110 THERAPEUTIC EXERCISES: CPT | Mod: PN

## 2022-05-11 NOTE — PROGRESS NOTES
"[facility]  Physical Therapy Daily Treatment Note     Name: Eva Gasca Escalera  Clinic Number: [id number]    Therapy Diagnosis:   Encounter Diagnoses   Name Primary?    Decreased functional mobility Yes    Decreased strength     Muscle tightness     Decreased range of motion      Physician: Mary Araiza    Visit Date: 5/11/2022    Physician: Mary Araiza  Physician Orders: PT Eval and Treat  Medical Diagnosis from Referral: Z98.890 (ICD-10-CM) - History of arthroscopy of left knee   Evaluation Date: 4/7/2022  Authorization Period Expiration: 12/31/2022   Plan of Care Expiration: 6/30/2022                Progress Update: 5/7/2022               FOTO: 1 / 3   Visit # / Visits authorized: 6/8        Time In: 0923 (Pnt arrived early)  Time Out: 1016  Total Billable Time: 53 minutes    Precautions: Standard  Insurance: Payor: MEDICAID / Plan: [place] [employer] CONNECT / Product Type: Managed Medicaid /     Subjective     Pt reports: that she has been good but has been getting muscle cramps.   States that she has a nerve conduction test on Monday.  She was compliant with home exercise program.  Response to previous treatment: sore  Functional change: walking better    Pain: 0/10  Location: left knee      Objective     Eva received therapeutic exercises to develop strength, endurance, ROM and flexibility for 53 minutes (medicaid) including:    Recumbent bike x 5 mins    Quad sets with towel  3x10  SLRs  2# 3x10  Bridges green theraband 2x10  Clams with green tb   3x10 B     Hip adduction with ball  3x10  SAQ  3#  3x10    Seated hamstring stretch  3x30 secs    Step ups 6"  3x10  SLS in parallel bars   3x30 secs    TKE with ball  3x10    Standing gastroc stretch  3x30 secs     Home Exercises Provided and Patient Education Provided     Education provided:   - low impact cardio.    Written Home Exercises Provided: yes.  Exercises were reviewed and Eva was able to " demonstrate them prior to the end of the session.  Eva demonstrated good  understanding of the education provided.     See EMR under Patient Instructions for exercises provided prior visit.    Assessment     Eva presents with decreased L LE and hip strength, along with genu valgus.  VC to facilitate quad muscle prior to SLR.  Continued strengthening, balance to improve ADL's, functional activities.  Eva is progressing well towards her goals.   Pt prognosis is Good.     Pt will continue to benefit from skilled outpatient physical therapy to address the deficits listed in the problem list box on initial evaluation, provide pt/family education and to maximize pt's level of independence in the home and community environment.     Pt's spiritual, cultural and educational needs considered and pt agreeable to plan of care and goals.    Anticipated barriers to physical therapy: None    Goals:       SHORT TERM GOALS: 4 weeks 5/11/2022     1. Recent signs and systems trend is improving in order to progress towards LTG's. ongoing   2. Patient will be independent with HEP in order to further progress and return to maximal function. ongoing   3. Pain rating at Worst: 5/10 in order to progress towards increased independence with activity. ongoing   4. Patient will be able to correct postural deviations in sitting and standing, to decrease pain and promote postural awareness for injury prevention.  ongoing      LONG TERM GOALS: 8 weeks 5/11/2022     1. Patient will return to normal ADL, recreational, and work related activities with less pain and limitation.  ongoing   2. Patient will improve AROM to stated goals in order to return to maximal functional potential.  ongoing   3. Patient will improve Strength to stated goals of appropriate musculature in order to improve functional independence.  ongoing   4. Pain Rating at Best: 1/10 to improve Quality of Life.  ongoing   5. Patient will meet predicted functional outcome  (FOTO) score: 75% to increase self-worth & perceived functional ability.  ongoing   6. Patient will have met/partially met personal goal of being able to walk with no pain ongoing         Plan     Continue POC as previously stated.    Cruz Carlin, PT

## 2022-05-19 ENCOUNTER — CLINICAL SUPPORT (OUTPATIENT)
Dept: REHABILITATION | Facility: HOSPITAL | Age: 37
End: 2022-05-19
Payer: MEDICAID

## 2022-05-19 DIAGNOSIS — M62.89 MUSCLE TIGHTNESS: ICD-10-CM

## 2022-05-19 DIAGNOSIS — R53.1 DECREASED STRENGTH: ICD-10-CM

## 2022-05-19 DIAGNOSIS — M25.60 DECREASED RANGE OF MOTION: ICD-10-CM

## 2022-05-19 DIAGNOSIS — R26.89 DECREASED FUNCTIONAL MOBILITY: Primary | ICD-10-CM

## 2022-05-19 PROCEDURE — 97110 THERAPEUTIC EXERCISES: CPT | Mod: PN

## 2022-05-19 NOTE — PROGRESS NOTES
"[facility] / [facility]  Physical Therapy Daily Treatment Note     Name: Eva Escalera  Clinic Number: [id number]    Therapy Diagnosis:   Encounter Diagnoses   Name Primary?    Decreased functional mobility Yes    Decreased strength     Muscle tightness     Decreased range of motion      Physician: Mary Araiza    Visit Date: 5/19/2022    Physician: Mary Araiza  Physician Orders: PT Eval and Treat  Medical Diagnosis from Referral: Z98.890 (ICD-10-CM) - History of arthroscopy of left knee   Evaluation Date: 4/7/2022  Authorization Period Expiration: 12/31/2022   Plan of Care Expiration: 6/30/2022                Progress Update: 5/7/2022               FOTO: 1 / 3   Visit # / Visits authorized: 7/8        Time In: 0907   (Pnt arrived late)  Time Out: 0945  Total Billable Time: 38 minutes    Precautions: Standard  Insurance: Payor: MEDICAID / Plan: [employer] CONNECT / Product Type: Managed Medicaid /     Subjective     Pt reports: that she is still having pain.  States that she had her nerve conduction test yesterday and they did not find any abnormalities.    She was compliant with home exercise program.  Response to previous treatment: sore  Functional change: walking better    Pain: 4/10  Location: left knee      Objective     Eva received therapeutic exercises to develop strength, endurance, ROM and flexibility for 38 minutes (medicaid) including:    Recumbent bike x 5 mins    TKE on step 3x10  Seated hamstring stretch  3x30 secs    Step ups 6"  3x10  SLS in parallel bars   3x30 secs    TKE with ball  3x10    Standing gastroc stretch  3x30 secs   Shuttle leg press 87#  3x10  SL Shuttle leg press  37#  3x10  Lateral walks in [place]  2 laps    Home Exercises Provided and Patient Education Provided     Education provided:   - low impact cardio.    Written Home Exercises Provided: yes.  Exercises were reviewed and Eva was able to demonstrate them prior to the end " of the session.  Eva demonstrated good  understanding of the education provided.     See EMR under Patient Instructions for exercises provided prior visit.    Assessment     Eva presents with decreased L LE and hip strength, along with genu valgus.  VC to facilitate quad muscle prior to SLR.  Continued strengthening, balance to improve ADL's, functional activities.  Eva is progressing well towards her goals.   Pt prognosis is Good.     Pt will continue to benefit from skilled outpatient physical therapy to address the deficits listed in the problem list box on initial evaluation, provide pt/family education and to maximize pt's level of independence in the home and community environment.     Pt's spiritual, cultural and educational needs considered and pt agreeable to plan of care and goals.    Anticipated barriers to physical therapy: None    Goals:       SHORT TERM GOALS: 4 weeks 5/19/2022     1. Recent signs and systems trend is improving in order to progress towards LTG's. ongoing   2. Patient will be independent with HEP in order to further progress and return to maximal function. ongoing   3. Pain rating at Worst: 5/10 in order to progress towards increased independence with activity. ongoing   4. Patient will be able to correct postural deviations in sitting and standing, to decrease pain and promote postural awareness for injury prevention.  ongoing      LONG TERM GOALS: 8 weeks 5/19/2022     1. Patient will return to normal ADL, recreational, and work related activities with less pain and limitation.  ongoing   2. Patient will improve AROM to stated goals in order to return to maximal functional potential.  ongoing   3. Patient will improve Strength to stated goals of appropriate musculature in order to improve functional independence.  ongoing   4. Pain Rating at Best: 1/10 to improve Quality of Life.  ongoing   5. Patient will meet predicted functional outcome (FOTO) score: 75% to increase  self-worth & perceived functional ability.  ongoing   6. Patient will have met/partially met personal goal of being able to walk with no pain ongoing         Plan     Continue POC as previously stated.    Cruz Carlin, PT

## 2022-05-24 ENCOUNTER — CLINICAL SUPPORT (OUTPATIENT)
Dept: REHABILITATION | Facility: HOSPITAL | Age: 37
End: 2022-05-24
Payer: MEDICAID

## 2022-05-24 DIAGNOSIS — M25.60 DECREASED RANGE OF MOTION: ICD-10-CM

## 2022-05-24 DIAGNOSIS — M62.89 MUSCLE TIGHTNESS: ICD-10-CM

## 2022-05-24 DIAGNOSIS — R53.1 DECREASED STRENGTH: ICD-10-CM

## 2022-05-24 DIAGNOSIS — R26.89 DECREASED FUNCTIONAL MOBILITY: Primary | ICD-10-CM

## 2022-05-24 PROCEDURE — 97110 THERAPEUTIC EXERCISES: CPT | Mod: PN

## 2022-05-24 NOTE — PROGRESS NOTES
"[facility] / [facility]  Physical Therapy Daily Treatment Note     Name: Eva Escalera  Clinic Number: [id number]    Therapy Diagnosis:   Encounter Diagnoses   Name Primary?    Decreased functional mobility Yes    Decreased strength     Muscle tightness     Decreased range of motion      Physician: Mary Araiza    Visit Date: 5/24/2022    Physician: Mary Araiza  Physician Orders: PT Eval and Treat  Medical Diagnosis from Referral: Z98.890 (ICD-10-CM) - History of arthroscopy of left knee   Evaluation Date: 4/7/2022  Authorization Period Expiration: 12/31/2022   Plan of Care Expiration: 6/30/2022                Progress Update: 5/7/2022               FOTO: 1 / 3   Visit # / Visits authorized: 9/8        Time In: 1408   (Pnt arrived late)  Time Out: 1445  Total Billable Time: 38 minutes    Precautions: Standard  Insurance: Payor: MEDICAID / Plan: [employer] CONNECT / Product Type: Managed Medicaid /     Subjective     Pt reports: that pain is about the same as last visit.    She was compliant with home exercise program.  Response to previous treatment: sore  Functional change: walking better    Pain: 4/10  Location: left knee      Objective     Eva received therapeutic exercises to develop strength, endurance, ROM and flexibility for 38 minutes (medicaid) including:    Recumbent bike x 5 mins    TKE on step 3x10  Seated hamstring stretch  3x30 secs   Step ups 6"  3x10  SLS in parallel bars   3x30 secs    TKE with ball  3x10    Standing gastroc stretch  3x30 secs   Shuttle leg press 87#  3x10  SL Shuttle leg press  37#  3x10  Lateral walks in [place]  2 laps    Home Exercises Provided and Patient Education Provided     Education provided:   - low impact cardio.    Written Home Exercises Provided: yes.  Exercises were reviewed and Eva was able to demonstrate them prior to the end of the session.  Eva demonstrated good  understanding of the education provided. "     See EMR under Patient Instructions for exercises provided prior visit.    Assessment     Eva presents with decreased L LE and hip strength, along with genu valgus.  VC to facilitate quad muscle prior to SLR.  Continued strengthening, balance to improve ADL's, functional activities.  Eva is progressing well towards her goals.   Pt prognosis is Good.     Pt will continue to benefit from skilled outpatient physical therapy to address the deficits listed in the problem list box on initial evaluation, provide pt/family education and to maximize pt's level of independence in the home and community environment.     Pt's spiritual, cultural and educational needs considered and pt agreeable to plan of care and goals.    Anticipated barriers to physical therapy: None    Goals:       SHORT TERM GOALS: 4 weeks 5/24/2022     1. Recent signs and systems trend is improving in order to progress towards LTG's. ongoing   2. Patient will be independent with HEP in order to further progress and return to maximal function. ongoing   3. Pain rating at Worst: 5/10 in order to progress towards increased independence with activity. ongoing   4. Patient will be able to correct postural deviations in sitting and standing, to decrease pain and promote postural awareness for injury prevention.  ongoing      LONG TERM GOALS: 8 weeks 5/24/2022     1. Patient will return to normal ADL, recreational, and work related activities with less pain and limitation.  ongoing   2. Patient will improve AROM to stated goals in order to return to maximal functional potential.  ongoing   3. Patient will improve Strength to stated goals of appropriate musculature in order to improve functional independence.  ongoing   4. Pain Rating at Best: 1/10 to improve Quality of Life.  ongoing   5. Patient will meet predicted functional outcome (FOTO) score: 75% to increase self-worth & perceived functional ability.  ongoing   6. Patient will have met/partially  met personal goal of being able to walk with no pain ongoing         Plan     Continue POC as previously stated.    Cruz Carlin, PT

## 2022-05-26 ENCOUNTER — CLINICAL SUPPORT (OUTPATIENT)
Dept: REHABILITATION | Facility: HOSPITAL | Age: 37
End: 2022-05-26
Payer: MEDICAID

## 2022-05-26 DIAGNOSIS — M25.60 DECREASED RANGE OF MOTION: ICD-10-CM

## 2022-05-26 DIAGNOSIS — R26.89 DECREASED FUNCTIONAL MOBILITY: Primary | ICD-10-CM

## 2022-05-26 DIAGNOSIS — R53.1 DECREASED STRENGTH: ICD-10-CM

## 2022-05-26 DIAGNOSIS — M62.89 MUSCLE TIGHTNESS: ICD-10-CM

## 2022-05-26 PROCEDURE — 97110 THERAPEUTIC EXERCISES: CPT | Mod: PN

## 2022-05-26 NOTE — PROGRESS NOTES
"[facility] / [facility]  Physical Therapy Daily Treatment Note     Name: Eva Escalera  Clinic Number: [id number]    Therapy Diagnosis:   Encounter Diagnoses   Name Primary?    Decreased functional mobility Yes    Decreased strength     Muscle tightness     Decreased range of motion      Physician: Mary Araiza    Visit Date: 5/26/2022    Physician: Mary Araiza  Physician Orders: PT Eval and Treat  Medical Diagnosis from Referral: Z98.890 (ICD-10-CM) - History of arthroscopy of left knee   Evaluation Date: 4/7/2022  Authorization Period Expiration: 12/31/2022   Plan of Care Expiration: 6/30/2022                Progress Update: 5/7/2022               FOTO: 1 / 3   Visit # / Visits authorized: 10/8        Time In: 0952  (Pnt arrived late)  Time Out: 1045  Total Billable Time: 53 minutes    Precautions: Standard  Insurance: Payor: MEDICAID / Plan: [employer] CONNECT / Product Type: Managed Medicaid /     Subjective     Pt reports: that pain is about the same as last visit.    She was compliant with home exercise program.  Response to previous treatment: sore  Functional change: walking better    Pain: 3/10  Location: left knee      Objective     Eva received therapeutic exercises to develop strength, endurance, ROM and flexibility for 53 minutes (medicaid) including:    Recumbent bike x 5 mins    TKE on step 3x10  Seated hamstring stretch  3x30 secs   Step ups 6"  3x10  SLS in parallel bars   3x30 secs    TKE with ball  3x10    Standing gastroc stretch  3x30 secs   Shuttle leg press 87#  3x10  SL Shuttle leg press  37#  3x10  Lateral walks in [place]  2 laps    Home Exercises Provided and Patient Education Provided     Education provided:   - low impact cardio.    Written Home Exercises Provided: yes.  Exercises were reviewed and Eva was able to demonstrate them prior to the end of the session.  Eva demonstrated good  understanding of the education provided. "     See EMR under Patient Instructions for exercises provided prior visit.    Assessment     Eva presents with decreased L LE and hip strength, along with genu valgus.  VC to facilitate quad muscle prior to SLR.  Continued strengthening, balance to improve ADL's, functional activities.  Eva is progressing well towards her goals.   Pt prognosis is Good.     Pt will continue to benefit from skilled outpatient physical therapy to address the deficits listed in the problem list box on initial evaluation, provide pt/family education and to maximize pt's level of independence in the home and community environment.     Pt's spiritual, cultural and educational needs considered and pt agreeable to plan of care and goals.    Anticipated barriers to physical therapy: None    Goals:       SHORT TERM GOALS: 4 weeks 5/26/2022     1. Recent signs and systems trend is improving in order to progress towards LTG's. ongoing   2. Patient will be independent with HEP in order to further progress and return to maximal function. ongoing   3. Pain rating at Worst: 5/10 in order to progress towards increased independence with activity. ongoing   4. Patient will be able to correct postural deviations in sitting and standing, to decrease pain and promote postural awareness for injury prevention.  ongoing      LONG TERM GOALS: 8 weeks 5/26/2022     1. Patient will return to normal ADL, recreational, and work related activities with less pain and limitation.  ongoing   2. Patient will improve AROM to stated goals in order to return to maximal functional potential.  ongoing   3. Patient will improve Strength to stated goals of appropriate musculature in order to improve functional independence.  ongoing   4. Pain Rating at Best: 1/10 to improve Quality of Life.  ongoing   5. Patient will meet predicted functional outcome (FOTO) score: 75% to increase self-worth & perceived functional ability.  ongoing   6. Patient will have met/partially  met personal goal of being able to walk with no pain ongoing         Plan     Continue POC as previously stated.    Cruz Carlin, PT

## 2022-06-01 ENCOUNTER — CLINICAL SUPPORT (OUTPATIENT)
Dept: REHABILITATION | Facility: HOSPITAL | Age: 37
End: 2022-06-01
Payer: MEDICAID

## 2022-06-01 DIAGNOSIS — M62.89 MUSCLE TIGHTNESS: ICD-10-CM

## 2022-06-01 DIAGNOSIS — M25.60 DECREASED RANGE OF MOTION: ICD-10-CM

## 2022-06-01 DIAGNOSIS — R26.89 DECREASED FUNCTIONAL MOBILITY: Primary | ICD-10-CM

## 2022-06-01 DIAGNOSIS — R53.1 DECREASED STRENGTH: ICD-10-CM

## 2022-06-01 PROCEDURE — 97110 THERAPEUTIC EXERCISES: CPT | Mod: PN

## 2022-06-01 NOTE — PROGRESS NOTES
"[facility] / [facility]  Physical Therapy Daily Treatment Note     Name: Eva Escalera  Clinic Number: [id number]    Therapy Diagnosis:   Encounter Diagnoses   Name Primary?    Decreased functional mobility Yes    Decreased strength     Muscle tightness     Decreased range of motion      Physician: Mary Araiza    Visit Date: 6/1/2022    Physician: Mary Araiza  Physician Orders: PT Eval and Treat  Medical Diagnosis from Referral: Z98.890 (ICD-10-CM) - History of arthroscopy of left knee   Evaluation Date: 4/7/2022  Authorization Period Expiration: 12/31/2022   Plan of Care Expiration: 6/30/2022                Progress Update: 5/7/2022               FOTO: 1 / 3   Visit # / Visits authorized: 14/12        Time In: 1150   (Pnt arrived late)  Time Out: 1230  Total Billable Time: 40 minutes    Precautions: Standard  Insurance: Payor: MEDICAID / Plan: [employer] CONNECT / Product Type: Managed Medicaid /     Subjective     Pt reports: that pain is about the same as last visit.    She was compliant with home exercise program.  Response to previous treatment: sore  Functional change: walking better    Pain: 3/10  Location: left knee      Objective     Eva received therapeutic exercises to develop strength, endurance, ROM and flexibility for 40 minutes (medicaid) including:    Recumbent bike x 5 mins    TKE on step 3x10  Seated hamstring stretch  3x30 secs   Step ups 6"  3x10   SLS in parallel bars   3x30 secs    TKE with ball  3x10    Standing gastroc stretch  3x30 secs   Shuttle leg press 87#  3x10  SL Shuttle leg press  37#  3x10  Lateral walks in [place]  2 laps  Lunges on BOSU  3x10    Home Exercises Provided and Patient Education Provided     Education provided:   - low impact cardio.    Written Home Exercises Provided: yes.  Exercises were reviewed and Eva was able to demonstrate them prior to the end of the session.  Eva demonstrated good  understanding of the " education provided.     See EMR under Patient Instructions for exercises provided prior visit.    Assessment     Eva presents with decreased L LE and hip strength, along with genu valgus.    Eva is progressing well towards her goals.   Pt prognosis is Good.     Pt will continue to benefit from skilled outpatient physical therapy to address the deficits listed in the problem list box on initial evaluation, provide pt/family education and to maximize pt's level of independence in the home and community environment.     Pt's spiritual, cultural and educational needs considered and pt agreeable to plan of care and goals.    Anticipated barriers to physical therapy: None    Goals:       SHORT TERM GOALS: 4 weeks 6/1/2022     1. Recent signs and systems trend is improving in order to progress towards LTG's. ongoing   2. Patient will be independent with HEP in order to further progress and return to maximal function. ongoing   3. Pain rating at Worst: 5/10 in order to progress towards increased independence with activity. ongoing   4. Patient will be able to correct postural deviations in sitting and standing, to decrease pain and promote postural awareness for injury prevention.  ongoing      LONG TERM GOALS: 8 weeks 6/1/2022     1. Patient will return to normal ADL, recreational, and work related activities with less pain and limitation.  ongoing   2. Patient will improve AROM to stated goals in order to return to maximal functional potential.  ongoing   3. Patient will improve Strength to stated goals of appropriate musculature in order to improve functional independence.  ongoing   4. Pain Rating at Best: 1/10 to improve Quality of Life.  ongoing   5. Patient will meet predicted functional outcome (FOTO) score: 75% to increase self-worth & perceived functional ability.  ongoing   6. Patient will have met/partially met personal goal of being able to walk with no pain ongoing         Plan     Continue POC as  previously stated.    Cruz Carlin, PT

## 2022-12-14 DIAGNOSIS — N64.89 OTHER SPECIFIED DISORDERS OF BREAST: Primary | ICD-10-CM

## 2022-12-23 DIAGNOSIS — N64.89 OTHER SPECIFIED DISORDERS OF BREAST: Primary | ICD-10-CM

## 2023-01-17 ENCOUNTER — HOSPITAL ENCOUNTER (OUTPATIENT)
Dept: RADIOLOGY | Facility: HOSPITAL | Age: 38
Discharge: HOME | End: 2023-01-17
Attending: SPECIALIST
Payer: MEDICAID

## 2023-01-17 DIAGNOSIS — N64.89 OTHER SPECIFIED DISORDERS OF BREAST: ICD-10-CM

## 2023-01-17 PROCEDURE — 77066 DX MAMMO INCL CAD BI: CPT | Mod: TC,PO

## 2023-01-17 PROCEDURE — 76642 ULTRASOUND BREAST LIMITED: CPT | Mod: TC,PO,RT

## 2023-02-28 ENCOUNTER — OCCUPATIONAL HEALTH (OUTPATIENT)
Dept: URGENT CARE | Facility: CLINIC | Age: 38
End: 2023-02-28

## 2023-02-28 DIAGNOSIS — Z00.00 ENCOUNTER FOR PHYSICAL EXAMINATION: Primary | ICD-10-CM

## 2023-02-28 LAB — COLLECTION ONLY: NORMAL

## 2023-02-28 PROCEDURE — 99499 DOT PHYSICAL: ICD-10-PCS | Mod: S$GLB,,,

## 2023-02-28 PROCEDURE — 99499 UNLISTED E&M SERVICE: CPT | Mod: S$GLB,,,

## 2023-07-05 DIAGNOSIS — R92.8 MAMMOGRAM ABNORMAL: Primary | ICD-10-CM

## 2023-07-31 ENCOUNTER — OFFICE VISIT (OUTPATIENT)
Dept: URGENT CARE | Facility: CLINIC | Age: 38
End: 2023-07-31
Payer: MEDICAID

## 2023-07-31 VITALS
DIASTOLIC BLOOD PRESSURE: 78 MMHG | RESPIRATION RATE: 16 BRPM | BODY MASS INDEX: 28.66 KG/M2 | WEIGHT: 172 LBS | HEART RATE: 89 BPM | HEIGHT: 65 IN | TEMPERATURE: 97 F | OXYGEN SATURATION: 98 % | SYSTOLIC BLOOD PRESSURE: 128 MMHG

## 2023-07-31 DIAGNOSIS — M79.643 TENDERNESS OF ANATOMICAL SNUFFBOX: Primary | ICD-10-CM

## 2023-07-31 PROCEDURE — 73130 XR HAND COMPLETE 3 VIEW RIGHT: ICD-10-PCS | Mod: RT,S$GLB,, | Performed by: RADIOLOGY

## 2023-07-31 PROCEDURE — 99214 PR OFFICE/OUTPT VISIT, EST, LEVL IV, 30-39 MIN: ICD-10-PCS | Mod: S$GLB,,,

## 2023-07-31 PROCEDURE — 73130 X-RAY EXAM OF HAND: CPT | Mod: RT,S$GLB,, | Performed by: RADIOLOGY

## 2023-07-31 PROCEDURE — 99214 OFFICE O/P EST MOD 30 MIN: CPT | Mod: S$GLB,,,

## 2023-07-31 RX ORDER — DICYCLOMINE HYDROCHLORIDE 10 MG/1
10 CAPSULE ORAL 3 TIMES DAILY
COMMUNITY
Start: 2023-06-20 | End: 2024-01-05

## 2023-07-31 RX ORDER — GABAPENTIN 300 MG/1
300 CAPSULE ORAL 3 TIMES DAILY
COMMUNITY
Start: 2023-02-06

## 2023-07-31 RX ORDER — ERGOCALCIFEROL 1.25 MG/1
1 CAPSULE ORAL
COMMUNITY
End: 2023-08-03 | Stop reason: ALTCHOICE

## 2023-07-31 RX ORDER — KETOCONAZOLE 20 MG/ML
SHAMPOO, SUSPENSION TOPICAL
COMMUNITY
Start: 2023-04-06

## 2023-07-31 NOTE — PROGRESS NOTES
"Subjective:      Patient ID: Eva Escalera is a 37 y.o. female.    Vitals:  height is 5' 5" (1.651 m) and weight is 78 kg (172 lb). Her temperature is 97 °F (36.1 °C). Her blood pressure is 128/78 and her pulse is 89. Her respiration is 16 and oxygen saturation is 98%.     Chief Complaint: Hand Pain    Ms. Escalera, has a past medical history of chronic pain, presents to clinic with a chief complaint of nontraumatic right thenar eminence burning pain for 4 days.  She is no known mechanism of injury, denies trauma she reports the pain is burning in nature pain is worse with flexion of thumb, or pressure.  She is taken gabapentin with significant relief.  Patient is right-hand dominant.  Denies any radiation of pain proximally or distally.  Patient does report some paresthesias, and numbness and tingling to the 1st and 2nd finger.  Tinel and Phalen test not perform due to pain.    Hand Pain   The incident occurred 3 to 5 days ago (x's 3-4 days). There was no injury mechanism. The pain is present in the right hand. The quality of the pain is described as burning and stabbing. The pain does not radiate. The pain is at a severity of 3/10. The pain is mild. The pain has been Fluctuating since the incident. Associated symptoms include numbness and tingling. Associated symptoms comments: At bedtime. The symptoms are aggravated by movement, palpation and lifting. Treatments tried: neurontin, motrin. The treatment provided moderate relief.       Constitution: Negative for chills.   HENT:  Negative for sinus pain.    Neck: Negative for painful lymph nodes.   Cardiovascular:  Negative for palpitations.   Eyes:  Negative for vision loss.   Respiratory:  Negative for cough.    Gastrointestinal:  Negative for nausea and vomiting.   Endocrine: cold intolerance and heat intolerance.   Genitourinary:  Negative for dysuria, frequency and urgency.   Musculoskeletal:  Positive for pain and abnormal ROM of joint.   Skin:  Negative " for rash.   Allergic/Immunologic: Negative for environmental allergies and seasonal allergies.   Neurological:  Positive for numbness. Negative for dizziness and altered mental status.   Hematologic/Lymphatic: Negative for swollen lymph nodes.   Psychiatric/Behavioral:  Negative for altered mental status.       Objective:     Physical Exam   Constitutional: She is oriented to person, place, and time. She appears well-developed. She is cooperative.  Non-toxic appearance. She appears ill. No distress. normal  HENT:   Head: Normocephalic and atraumatic.   Ears:   Right Ear: Hearing and external ear normal.   Left Ear: Hearing, tympanic membrane, external ear and ear canal normal.   Nose: Nose normal. No mucosal edema or nasal deformity. No epistaxis. Right sinus exhibits no maxillary sinus tenderness and no frontal sinus tenderness. Left sinus exhibits no maxillary sinus tenderness and no frontal sinus tenderness.   Mouth/Throat: Uvula is midline and mucous membranes are normal. Mucous membranes are moist. No trismus in the jaw. Normal dentition. No uvula swelling.   Eyes: Conjunctivae and lids are normal. Pupils are equal, round, and reactive to light. Extraocular movement intact   Neck: Trachea normal and phonation normal. Neck supple.   Cardiovascular: Normal rate, regular rhythm, normal heart sounds and normal pulses.   Pulmonary/Chest: Effort normal and breath sounds normal.   Abdominal: Normal appearance.   Musculoskeletal:         General: Tenderness present.      Right hand: She exhibits normal capillary refill. Decreased sensation noted. Decreased sensation is present in the radial distribution. Right thumb: Exhibits tenderness. Motor /Testing: Index Finger: 5/5. Thumb FPL: 5/5. Pinch Mechanism: 3/5. Atrophy: There is right thenar atrophy. There is right hypothenar atrophy.        Hands:    Neurological: no focal deficit. She is alert and oriented to person, place, and time. She exhibits normal muscle tone.    Skin: Skin is warm, dry, intact and not diaphoretic. Capillary refill takes 2 to 3 seconds.   Psychiatric: Her speech is normal and behavior is normal. Mood, judgment and thought content normal.   Nursing note and vitals reviewed.      Assessment:     1. Tenderness of anatomical snuffbox        Plan:       Tenderness of anatomical snuffbox  -     XR HAND COMPLETE 3 VIEW RIGHT; Future; Expected date: 07/31/2023  -     E - OTHER  -     Ambulatory referral/consult to Orthopedics      Reading Physician Reading Date Result Priority   Pawel Thomas MD  [phone]  [phone] 7/31/2023 STAT     Narrative & Impression  EXAMINATION:  XR HAND COMPLETE 3 VIEW RIGHT     CLINICAL HISTORY:  Pain in unspecified hand     TECHNIQUE:  PA, lateral, and oblique views of the right hand were performed.     COMPARISON:  None     FINDINGS:  No displaced fracture.  No traumatic malalignment.  Preserved joint spaces.  No radiopaque retained foreign body.        Electronically signed by: Pawel Thomas  Date:                                            07/31/2023  Time:                                           11:02

## 2023-08-03 ENCOUNTER — HOSPITAL ENCOUNTER (INPATIENT)
Facility: HOSPITAL | Age: 38
LOS: 3 days | Discharge: HOME | DRG: 872 | End: 2023-08-06
Attending: EMERGENCY MEDICINE | Admitting: STUDENT IN AN ORGANIZED HEALTH CARE EDUCATION/TRAINING PROGRAM
Payer: MEDICAID

## 2023-08-03 ENCOUNTER — CLINICAL SUPPORT (OUTPATIENT)
Dept: SMOKING CESSATION | Facility: CLINIC | Age: 38
End: 2023-08-03
Payer: MEDICAID

## 2023-08-03 DIAGNOSIS — R11.2 INTRACTABLE NAUSEA AND VOMITING: ICD-10-CM

## 2023-08-03 DIAGNOSIS — K52.9 COLITIS: ICD-10-CM

## 2023-08-03 DIAGNOSIS — Z72.0 TOBACCO USE: Primary | ICD-10-CM

## 2023-08-03 DIAGNOSIS — F12.188 CANNABIS HYPEREMESIS SYNDROME CONCURRENT WITH AND DUE TO CANNABIS ABUSE: Primary | ICD-10-CM

## 2023-08-03 DIAGNOSIS — R07.9 CHEST PAIN: ICD-10-CM

## 2023-08-03 DIAGNOSIS — R10.9 ABDOMINAL PAIN, UNSPECIFIED ABDOMINAL LOCATION: ICD-10-CM

## 2023-08-03 PROBLEM — A41.9 SEPSIS: Status: ACTIVE | Noted: 2023-08-03

## 2023-08-03 PROBLEM — F12.20 TETRAHYDROCANNABINOL (THC) DEPENDENCE: Status: ACTIVE | Noted: 2023-08-03

## 2023-08-03 LAB
ALBUMIN SERPL BCP-MCNC: 4.1 G/DL (ref 3.5–5.2)
ALP SERPL-CCNC: 74 U/L (ref 55–135)
ALT SERPL W/O P-5'-P-CCNC: 21 U/L (ref 10–44)
AMPHET+METHAMPHET UR QL: NEGATIVE
ANION GAP SERPL CALC-SCNC: 8 MMOL/L (ref 8–16)
AST SERPL-CCNC: 25 U/L (ref 10–40)
B-HCG UR QL: NEGATIVE
BARBITURATES UR QL SCN>200 NG/ML: NEGATIVE
BASOPHILS # BLD AUTO: 0.04 K/UL (ref 0–0.2)
BASOPHILS NFR BLD: 0.3 % (ref 0–1.9)
BENZODIAZ UR QL SCN>200 NG/ML: NEGATIVE
BILIRUB SERPL-MCNC: 0.2 MG/DL (ref 0.1–1)
BILIRUB UR QL STRIP: NEGATIVE
BUN SERPL-MCNC: 9 MG/DL (ref 6–20)
BZE UR QL SCN: NEGATIVE
CALCIUM SERPL-MCNC: 9.2 MG/DL (ref 8.7–10.5)
CANNABINOIDS UR QL SCN: ABNORMAL
CHLORIDE SERPL-SCNC: 111 MMOL/L (ref 95–110)
CLARITY UR: CLEAR
CO2 SERPL-SCNC: 21 MMOL/L (ref 23–29)
COLOR UR: YELLOW
CREAT SERPL-MCNC: 0.8 MG/DL (ref 0.5–1.4)
CREAT SERPL-MCNC: 0.9 MG/DL (ref 0.5–1.4)
CREAT UR-MCNC: 121 MG/DL (ref 15–325)
CTP QC/QA: YES
DIFFERENTIAL METHOD: ABNORMAL
EOSINOPHIL # BLD AUTO: 0.1 K/UL (ref 0–0.5)
EOSINOPHIL NFR BLD: 0.4 % (ref 0–8)
ERYTHROCYTE [DISTWIDTH] IN BLOOD BY AUTOMATED COUNT: 13 % (ref 11.5–14.5)
EST. GFR  (NO RACE VARIABLE): >60 ML/MIN/1.73 M^2
GLUCOSE SERPL-MCNC: 139 MG/DL (ref 70–110)
GLUCOSE UR QL STRIP: NEGATIVE
HCT VFR BLD AUTO: 42.3 % (ref 37–48.5)
HGB BLD-MCNC: 14.3 G/DL (ref 12–16)
HGB UR QL STRIP: NEGATIVE
IMM GRANULOCYTES # BLD AUTO: 0.06 K/UL (ref 0–0.04)
IMM GRANULOCYTES NFR BLD AUTO: 0.4 % (ref 0–0.5)
KETONES UR QL STRIP: NEGATIVE
LEUKOCYTE ESTERASE UR QL STRIP: NEGATIVE
LIPASE SERPL-CCNC: 25 U/L (ref 4–60)
LYMPHOCYTES # BLD AUTO: 3.4 K/UL (ref 1–4.8)
LYMPHOCYTES NFR BLD: 24.6 % (ref 18–48)
MCH RBC QN AUTO: 29.5 PG (ref 27–31)
MCHC RBC AUTO-ENTMCNC: 33.8 G/DL (ref 32–36)
MCV RBC AUTO: 87 FL (ref 82–98)
MONOCYTES # BLD AUTO: 0.9 K/UL (ref 0.3–1)
MONOCYTES NFR BLD: 6.4 % (ref 4–15)
NEUTROPHILS # BLD AUTO: 9.4 K/UL (ref 1.8–7.7)
NEUTROPHILS NFR BLD: 67.9 % (ref 38–73)
NITRITE UR QL STRIP: NEGATIVE
NRBC BLD-RTO: 0 /100 WBC
OPIATES UR QL SCN: NEGATIVE
PCP UR QL SCN>25 NG/ML: NEGATIVE
PH UR STRIP: 7 [PH] (ref 5–8)
PLATELET # BLD AUTO: 323 K/UL (ref 150–450)
PMV BLD AUTO: 10.1 FL (ref 9.2–12.9)
POTASSIUM SERPL-SCNC: 3.6 MMOL/L (ref 3.5–5.1)
PROT SERPL-MCNC: 8 G/DL (ref 6–8.4)
PROT UR QL STRIP: ABNORMAL
RBC # BLD AUTO: 4.84 M/UL (ref 4–5.4)
SAMPLE: NORMAL
SODIUM SERPL-SCNC: 140 MMOL/L (ref 136–145)
SP GR UR STRIP: 1.01 (ref 1–1.03)
TOXICOLOGY INFORMATION: ABNORMAL
URN SPEC COLLECT METH UR: ABNORMAL
UROBILINOGEN UR STRIP-ACNC: NEGATIVE EU/DL
WBC # BLD AUTO: 13.82 K/UL (ref 3.9–12.7)

## 2023-08-03 PROCEDURE — 99285 EMERGENCY DEPT VISIT HI MDM: CPT | Mod: 25

## 2023-08-03 PROCEDURE — 25000003 PHARM REV CODE 250: Performed by: EMERGENCY MEDICINE

## 2023-08-03 PROCEDURE — 63600175 PHARM REV CODE 636 W HCPCS: Performed by: EMERGENCY MEDICINE

## 2023-08-03 PROCEDURE — 96376 TX/PRO/DX INJ SAME DRUG ADON: CPT

## 2023-08-03 PROCEDURE — 80307 DRUG TEST PRSMV CHEM ANLYZR: CPT | Performed by: EMERGENCY MEDICINE

## 2023-08-03 PROCEDURE — 81003 URINALYSIS AUTO W/O SCOPE: CPT | Mod: 59 | Performed by: EMERGENCY MEDICINE

## 2023-08-03 PROCEDURE — G0378 HOSPITAL OBSERVATION PER HR: HCPCS

## 2023-08-03 PROCEDURE — 96367 TX/PROPH/DG ADDL SEQ IV INF: CPT

## 2023-08-03 PROCEDURE — 99406 BEHAV CHNG SMOKING 3-10 MIN: CPT | Mod: S$GLB,,, | Performed by: INTERNAL MEDICINE

## 2023-08-03 PROCEDURE — 96361 HYDRATE IV INFUSION ADD-ON: CPT

## 2023-08-03 PROCEDURE — 85025 COMPLETE CBC W/AUTO DIFF WBC: CPT | Performed by: EMERGENCY MEDICINE

## 2023-08-03 PROCEDURE — 96366 THER/PROPH/DIAG IV INF ADDON: CPT

## 2023-08-03 PROCEDURE — 96375 TX/PRO/DX INJ NEW DRUG ADDON: CPT

## 2023-08-03 PROCEDURE — 96365 THER/PROPH/DIAG IV INF INIT: CPT

## 2023-08-03 PROCEDURE — 83690 ASSAY OF LIPASE: CPT | Performed by: EMERGENCY MEDICINE

## 2023-08-03 PROCEDURE — C9113 INJ PANTOPRAZOLE SODIUM, VIA: HCPCS | Performed by: EMERGENCY MEDICINE

## 2023-08-03 PROCEDURE — 80053 COMPREHEN METABOLIC PANEL: CPT | Performed by: EMERGENCY MEDICINE

## 2023-08-03 PROCEDURE — 25500020 PHARM REV CODE 255: Performed by: EMERGENCY MEDICINE

## 2023-08-03 PROCEDURE — 81025 URINE PREGNANCY TEST: CPT | Performed by: EMERGENCY MEDICINE

## 2023-08-03 PROCEDURE — 63600175 PHARM REV CODE 636 W HCPCS: Performed by: STUDENT IN AN ORGANIZED HEALTH CARE EDUCATION/TRAINING PROGRAM

## 2023-08-03 PROCEDURE — 82565 ASSAY OF CREATININE: CPT

## 2023-08-03 PROCEDURE — 99406 PT REFUSED TOBACCO CESSATION: ICD-10-PCS | Mod: S$GLB,,, | Performed by: INTERNAL MEDICINE

## 2023-08-03 PROCEDURE — 12000002 HC ACUTE/MED SURGE SEMI-PRIVATE ROOM

## 2023-08-03 RX ORDER — HYOSCYAMINE SULFATE 0.5 MG/ML
0.5 INJECTION, SOLUTION SUBCUTANEOUS
Status: COMPLETED | OUTPATIENT
Start: 2023-08-03 | End: 2023-08-03

## 2023-08-03 RX ORDER — LORAZEPAM 2 MG/ML
0.5 INJECTION INTRAMUSCULAR
Status: DISCONTINUED | OUTPATIENT
Start: 2023-08-03 | End: 2023-08-04

## 2023-08-03 RX ORDER — MEDROXYPROGESTERONE ACETATE 150 MG/ML
150 INJECTION, SUSPENSION INTRAMUSCULAR
COMMUNITY
Start: 2023-07-14

## 2023-08-03 RX ORDER — METRONIDAZOLE 500 MG/100ML
500 INJECTION, SOLUTION INTRAVENOUS
Status: DISCONTINUED | OUTPATIENT
Start: 2023-08-03 | End: 2023-08-03

## 2023-08-03 RX ORDER — DROPERIDOL 2.5 MG/ML
1.25 INJECTION, SOLUTION INTRAMUSCULAR; INTRAVENOUS ONCE
Status: COMPLETED | OUTPATIENT
Start: 2023-08-03 | End: 2023-08-03

## 2023-08-03 RX ORDER — LEVOFLOXACIN 5 MG/ML
500 INJECTION, SOLUTION INTRAVENOUS
Status: COMPLETED | OUTPATIENT
Start: 2023-08-03 | End: 2023-08-03

## 2023-08-03 RX ORDER — NALOXONE HCL 0.4 MG/ML
0.02 VIAL (ML) INJECTION
Status: DISCONTINUED | OUTPATIENT
Start: 2023-08-03 | End: 2023-08-06 | Stop reason: HOSPADM

## 2023-08-03 RX ORDER — METRONIDAZOLE 500 MG/100ML
500 INJECTION, SOLUTION INTRAVENOUS
Status: DISCONTINUED | OUTPATIENT
Start: 2023-08-03 | End: 2023-08-06 | Stop reason: HOSPADM

## 2023-08-03 RX ORDER — PROCHLORPERAZINE EDISYLATE 5 MG/ML
5 INJECTION INTRAMUSCULAR; INTRAVENOUS EVERY 6 HOURS PRN
Status: DISCONTINUED | OUTPATIENT
Start: 2023-08-03 | End: 2023-08-06 | Stop reason: HOSPADM

## 2023-08-03 RX ORDER — TALC
9 POWDER (GRAM) TOPICAL NIGHTLY PRN
Status: DISCONTINUED | OUTPATIENT
Start: 2023-08-04 | End: 2023-08-06 | Stop reason: HOSPADM

## 2023-08-03 RX ORDER — PANTOPRAZOLE SODIUM 40 MG/10ML
40 INJECTION, POWDER, LYOPHILIZED, FOR SOLUTION INTRAVENOUS
Status: COMPLETED | OUTPATIENT
Start: 2023-08-03 | End: 2023-08-03

## 2023-08-03 RX ORDER — ONDANSETRON 2 MG/ML
8 INJECTION INTRAMUSCULAR; INTRAVENOUS
Status: COMPLETED | OUTPATIENT
Start: 2023-08-03 | End: 2023-08-03

## 2023-08-03 RX ORDER — SODIUM CHLORIDE 0.9 % (FLUSH) 0.9 %
10 SYRINGE (ML) INJECTION
Status: DISCONTINUED | OUTPATIENT
Start: 2023-08-03 | End: 2023-08-06 | Stop reason: HOSPADM

## 2023-08-03 RX ORDER — CIPROFLOXACIN 2 MG/ML
400 INJECTION, SOLUTION INTRAVENOUS
Status: DISCONTINUED | OUTPATIENT
Start: 2023-08-03 | End: 2023-08-06 | Stop reason: HOSPADM

## 2023-08-03 RX ORDER — ONDANSETRON 2 MG/ML
4 INJECTION INTRAMUSCULAR; INTRAVENOUS EVERY 8 HOURS PRN
Status: DISCONTINUED | OUTPATIENT
Start: 2023-08-03 | End: 2023-08-04

## 2023-08-03 RX ORDER — ACETAMINOPHEN 325 MG/1
650 TABLET ORAL EVERY 8 HOURS PRN
Status: DISCONTINUED | OUTPATIENT
Start: 2023-08-03 | End: 2023-08-06 | Stop reason: HOSPADM

## 2023-08-03 RX ORDER — SODIUM CHLORIDE, SODIUM LACTATE, POTASSIUM CHLORIDE, CALCIUM CHLORIDE 600; 310; 30; 20 MG/100ML; MG/100ML; MG/100ML; MG/100ML
INJECTION, SOLUTION INTRAVENOUS CONTINUOUS
Status: DISCONTINUED | OUTPATIENT
Start: 2023-08-03 | End: 2023-08-04

## 2023-08-03 RX ADMIN — METRONIDAZOLE 500 MG: 500 INJECTION, SOLUTION INTRAVENOUS at 10:08

## 2023-08-03 RX ADMIN — ONDANSETRON 4 MG: 2 INJECTION INTRAMUSCULAR; INTRAVENOUS at 01:08

## 2023-08-03 RX ADMIN — LORAZEPAM 0.5 MG: 2 INJECTION INTRAMUSCULAR at 08:08

## 2023-08-03 RX ADMIN — CIPROFLOXACIN 400 MG: 2 INJECTION, SOLUTION INTRAVENOUS at 07:08

## 2023-08-03 RX ADMIN — METRONIDAZOLE 500 MG: 500 INJECTION, SOLUTION INTRAVENOUS at 05:08

## 2023-08-03 RX ADMIN — SODIUM CHLORIDE 1000 ML: 0.9 INJECTION, SOLUTION INTRAVENOUS at 03:08

## 2023-08-03 RX ADMIN — PROMETHAZINE HYDROCHLORIDE 12.5 MG: 25 INJECTION INTRAMUSCULAR; INTRAVENOUS at 05:08

## 2023-08-03 RX ADMIN — IOHEXOL 100 ML: 350 INJECTION, SOLUTION INTRAVENOUS at 05:08

## 2023-08-03 RX ADMIN — LEVOFLOXACIN 500 MG: 5 INJECTION, SOLUTION INTRAVENOUS at 06:08

## 2023-08-03 RX ADMIN — LORAZEPAM 0.5 MG: 2 INJECTION INTRAMUSCULAR at 11:08

## 2023-08-03 RX ADMIN — HYOSCYAMINE SULFATE 0.5 MG: 0.5 INJECTION, SOLUTION SUBCUTANEOUS at 07:08

## 2023-08-03 RX ADMIN — LORAZEPAM 0.5 MG: 2 INJECTION INTRAMUSCULAR at 05:08

## 2023-08-03 RX ADMIN — PANTOPRAZOLE SODIUM 40 MG: 40 INJECTION, POWDER, FOR SOLUTION INTRAVENOUS at 03:08

## 2023-08-03 RX ADMIN — LORAZEPAM 0.5 MG: 2 INJECTION INTRAMUSCULAR at 01:08

## 2023-08-03 RX ADMIN — DROPERIDOL 1.25 MG: 2.5 INJECTION, SOLUTION INTRAMUSCULAR; INTRAVENOUS at 03:08

## 2023-08-03 RX ADMIN — PROCHLORPERAZINE EDISYLATE 5 MG: 5 INJECTION INTRAMUSCULAR; INTRAVENOUS at 08:08

## 2023-08-03 RX ADMIN — ONDANSETRON 8 MG: 2 INJECTION INTRAMUSCULAR; INTRAVENOUS at 03:08

## 2023-08-03 RX ADMIN — SODIUM CHLORIDE, SODIUM LACTATE, POTASSIUM CHLORIDE, AND CALCIUM CHLORIDE: .6; .31; .03; .02 INJECTION, SOLUTION INTRAVENOUS at 09:08

## 2023-08-03 NOTE — PLAN OF CARE
[facility]  Initial Discharge Assessment       Primary Care Provider: [facility]    Admission Diagnosis: Colitis [K52.9]    Admission Date: 8/3/2023  Expected Discharge Date:     Transition of Care Barriers: None    Payor: MEDICAID / Plan: [place] [employer] / Product Type: Managed Medicaid /     Extended Emergency Contact Information  Primary Emergency Contact: Grace Chapman  Mobile Phone: [phone]  Relation: Relative  Preferred language: English   needed? No    Discharge Plan A: Home  Discharge Plan B: Home with family      [id number] - [place] - [address] [facility] [address]  [address]  [facility] [address]  Phone: [phone] Fax: [phone]    DC assessment completed at bedside with pt, information on facesheet verified. Lives at listed address with son and uncle. Son will drive her home. PCP is [facility]. [facility]. Denies coumadin, HH, HD, DME. Independent at baseline, drives self. Insurance verified. Denies POA. Denies recent admission. Planning to DC home when clear.     Initial Assessment (most recent)       Adult Discharge Assessment - 08/03/23 1117          Discharge Assessment    Assessment Type Discharge Planning Assessment     Confirmed/corrected address, phone number and insurance Yes     Confirmed Demographics Correct on Facesheet     Source of Information patient     Does patient/caregiver understand observation status Yes     Communicated CHEKO with patient/caregiver Yes     People in Home child(gabby), dependent;other relative(s)     Facility Arrived From: home     Do you expect to return to your current living situation? Yes     Do you have help at home or someone to help you manage your care at home? Yes     Who are your caregiver(s) and their phone number(s)? son     Prior to hospitilization cognitive status: Unable to Assess     Current cognitive  status: Alert/Oriented     Equipment Currently Used at Home none     Readmission within 30 days? No     Patient currently being followed by outpatient case management? No     Do you currently have service(s) that help you manage your care at home? No     Do you take prescription medications? Yes     Do you have prescription coverage? Yes     Coverage [place] [employer]     Do you have any problems affording any of your prescribed medications? No     Is the patient taking medications as prescribed? yes     Who is going to help you get home at discharge? son     How do you get to doctors appointments? car, drives self     Are you on dialysis? No     Do you take coumadin? No     Discharge Plan A Home     Discharge Plan B Home with family     DME Needed Upon Discharge  none     Discharge Plan discussed with: Patient     Transition of Care Barriers None

## 2023-08-03 NOTE — HPI
Eva Escalera is a 37 year old female with a past medical history of CRISTINE/MDD, IBS, and THC use, who presents with one day of lower abdominal pain associated with NBNB emesis and diarrhea as well as subjective fevers and chills. She states that she has not been around recently ill friends or family members. She has had difficulty tolerating PO. She received 2 L bolus IV fluids in the ED with Levaquin and Flagyl as well as PRN antiemetics in the ED. Hospital Medicine was consulted for admission.

## 2023-08-03 NOTE — PATIENT INSTRUCTIONS
Pt states that she is not currently smoking. Has been tobacco free. Seen for inpatient smoking cessation on 8/3/2023

## 2023-08-03 NOTE — PHARMACY MED REC
"            Admission Medication History     The home medication history was taken by Corrie Gabriel.    You may go to "Admission" then "Reconcile Home Medications" tabs to review and/or act upon these items.     The home medication list has been updated by the Pharmacy department.   Please read ALL comments highlighted in yellow.   Please address this information as you see fit.    Feel free to contact us if you have any questions or require assistance.      The medications listed below were removed from the home medication list. Please reorder if appropriate:  Patient reports no longer taking the following medication(s):  Ventolin inh  Zyrtec  Ergocalciferol  Flonase  Ibuprofen 800mg  Toradol  Zofran odt  Percocet 5/325mg -flagged  Protonix          Medications listed below were obtained from: Patient/family and Analytic software- [employer]  Current Facility-Administered Medications on File Prior to Encounter   Medication Dose Route Frequency Provider Last Rate Last Admin    lactated ringers infusion   Intravenous Continuous Josiah Wolfe MD   Stopped at 06/02/21 1630     Current Outpatient Medications on File Prior to Encounter   Medication Sig Dispense Refill    cholecalciferol, vitamin D3, 1,250 mcg (50,000 unit) capsule Take 50,000 Units by mouth every Wednesday.       dicyclomine (BENTYL) 10 MG capsule Take 10 mg by mouth 3 (three) times daily.      linaCLOtide (LINZESS) 145 mcg Cap capsule Take 145 mcg by mouth before breakfast.      medroxyPROGESTERone (DEPO-PROVERA) 150 mg/mL Syrg Inject 150 mg into the muscle every 3 (three) months.      gabapentin (NEURONTIN) 300 MG capsule Take 300 mg by mouth 3 (three) times daily.      ketoconazole (NIZORAL) 2 % shampoo USE TO WASH GROIN AREA EVERY OTHER DAY FOR 15 DAYS      oxyCODONE-acetaminophen (PERCOCET) 5-325 mg per tablet Take 1 tablet by mouth every 4 (four) hours as needed for Pain. 18 tablet 0    pulse oximeter (PULSE OXIMETER) device by Apply Externally " route 2 (two) times a day. Use twice daily at 8 AM and 3 PM and record the value in Dinetouchhart as directed. 1 each 0    valacyclovir (VALTREX) 500 MG tablet Take 500 mg by mouth 2 (two) times daily as needed.       [DISCONTINUED] albuterol (VENTOLIN HFA) 90 mcg/actuation inhaler Inhale 2 puffs into the lungs every 6 (six) hours as needed for Wheezing. Rescue 18 g 0    [DISCONTINUED] cetirizine (ZYRTEC) 10 MG tablet Take 1 tablet (10 mg total) by mouth once daily. 30 tablet 0    [DISCONTINUED] docusate calcium (SURFAK) 240 mg capsule Take 1 capsule (240 mg total) by mouth 2 (two) times daily.  0    [DISCONTINUED] ergocalciferol (ERGOCALCIFEROL) 50,000 unit Cap Take 1 capsule by mouth every 7 days.      [DISCONTINUED] fluticasone propionate (FLONASE) 50 mcg/actuation nasal spray 1 spray (50 mcg total) by Each Nostril route once daily. 15.8 mL 0    [DISCONTINUED] ibuprofen (ADVIL,MOTRIN) 800 MG tablet Take 800 mg by mouth 3 (three) times daily as needed for Pain.      [DISCONTINUED] ketorolac (TORADOL) 10 mg tablet Take 1 tablet (10 mg total) by mouth every 6 (six) hours.      [DISCONTINUED] medroxyPROGESTERone (DEPO-PROVERA) 150 mg/mL injection Inject 150 mg into the muscle every 3 (three) months.      [DISCONTINUED] ondansetron (ZOFRAN-ODT) 4 MG TbDL Take 1 tablet (4 mg total) by mouth every 8 (eight) hours as needed (nausea, vomiting). 20 tablet 0    [DISCONTINUED] pantoprazole (PROTONIX) 40 MG tablet Take 1 tablet (40 mg total) by mouth once daily. (Patient taking differently: Take 40 mg by mouth daily as needed. ) 30 tablet 0           Corrie Gabriel  [id number]      .

## 2023-08-03 NOTE — DISCHARGE INSTRUCTIONS
Rest and drink adequate fluids. Take medications as prescribed. Monitor your blood pressure at home three times a day and bring a record with you to your follow up appointment. Return to ED for any worsening symptoms or concerns. It is important to refrain from using THC to avoid worsening symptoms.     Discharge Instructions, [facility] Medicine    Thank you for choosing [facility] for your medical care.     You were admitted to the hospital with colitis and intractable nausea and vomiting.     Please note your discharge instructions, including diet/activity restrictions, follow-up appointments, and medication changes.  If you have any questions about your medical issues, prescriptions, or any other questions, please feel free to contact the [facility] Medicine Dept at [phone] and we will help.    If you are previously with Home health, outpatient PT/OT or under a therapy program, you are cleared to return to those programs.    Please direct all long term medication refills and follow up to your primary care provider, [facility]. Thank you again for letting us take care of your health care needs.    Please note the following discharge instructions per your discharging physician-  Kenzie Watson NP

## 2023-08-03 NOTE — ED PROVIDER NOTES
Encounter Date: 8/3/2023       History     Chief Complaint   Patient presents with    Abdominal Pain     Beginning at midnight. N/V/D. Pt actively vomiting during triage, having diarrhea. Tried taking Bentyl with no relief.      37-year-old female presented emergency department with nausea vomiting and diarrhea and abdominal pain and cramps.  Patient has diffuse abdominal tenderness associated with nausea and vomiting.  Patient has cramps in right lower abdomen as well.  Denies fever or chills.  Denies chest pain or shortness of breath.  Patient states she takes Linzess which sometimes can cause diarrhea.  Patient also admits to using marijuana when asked about it.  Denies fever or chills.      Review of patient's allergies indicates:   Allergen Reactions    Propofol analogues Nausea And Vomiting and Other (See Comments)     Stomach problems     Past Medical History:   Diagnosis Date    Anxiety     Autoimmune disease     SEES RHEUMATOLOGIST, positve STACIE    Depression     General anesthetics causing adverse effect in therapeutic use     allergic to propofol    Herpes     Menometrorrhagia     PONV (postoperative nausea and vomiting)      Past Surgical History:   Procedure Laterality Date    ANAL SPHINCTEROTOMY N/A 2020    Procedure: SPHINCTEROTOMY, ANAL - Fissurectomy;  Surgeon: James Tian MD;  Location: [facility];  Service: General;  Laterality: N/A;    BREAST RECONSTRUCTION       SECTION      COLONOSCOPY N/A 2019    Procedure: COLONOSCOPY;  Surgeon: Camacho Dove MD;  Location: [facility];  Service: Endoscopy;  Laterality: N/A;    COSMETIC SURGERY  2020    liposuction, fat graft to buttocks    DIGITAL RECTAL EXAMINATION UNDER ANESTHESIA N/A 2020    Procedure: EXAM UNDER ANESTHESIA, DIGITAL, RECTUM;  Surgeon: James Tian MD;  Location: [facility] OR;  Service: General;  Laterality: N/A;    EXAMINATION UNDER ANESTHESIA N/A 2021    Procedure: EXAM UNDER  ANESTHESIA;  Surgeon: James Tian MD;  Location: [facility] OR;  Service: General;  Laterality: N/A;    FISTULOTOMY N/A 06/02/2021    Procedure: FISTULOTOMY;  Surgeon: James Tian MD;  Location: [facility] OR;  Service: General;  Laterality: N/A;    HYSTEROSCOPY WITH DILATION AND CURETTAGE OF UTERUS N/A 12/17/2018    Procedure: HYSTEROSCOPY, WITH DILATION AND CURETTAGE OF UTERUS;  Surgeon: Gloria Umaña MD;  Location: [facility] OR;  Service: OB/GYN;  Laterality: N/A;    LASER ABLATION OF THE CERVIX      LIPOMA RESECTION      LIPOSUCTION OF ABDOMEN      MYOMECTOMY N/A 12/17/2018    Procedure: MYOMECTOMY;  Surgeon: Gloria Umaña MD;  Location: [facility] OR;  Service: OB/GYN;  Laterality: N/A;  myosure REF RM- [id number]  time= 0.45  Aqulex Ref AQL-100P  [id number]     Family History   Adopted: Yes     Social History     Tobacco Use    Smoking status: Some Days     Current packs/day: 0.25     Types: Cigarettes    Smokeless tobacco: Never   Substance Use Topics    Alcohol use: Yes     Comment: socially    Drug use: Yes     Types: Marijuana     Review of Systems   Constitutional: Negative.    HENT: Negative.     Eyes: Negative.    Respiratory: Negative.     Cardiovascular: Negative.  Negative for chest pain.   Gastrointestinal:  Positive for abdominal pain, diarrhea, nausea and vomiting.   Endocrine: Negative.    Genitourinary: Negative.    Musculoskeletal: Negative.    Skin: Negative.    Allergic/Immunologic: Negative.    Neurological: Negative.    Hematological: Negative.    Psychiatric/Behavioral: Negative.     All other systems reviewed and are negative.      Physical Exam     Initial Vitals [08/03/23 0320]   BP Pulse Resp Temp SpO2   (!) 148/78 74 20 98.7 °F (37.1 °C) 99 %      MAP       --         Physical Exam    Nursing note and vitals reviewed.  Constitutional: She appears well-developed and well-nourished.   HENT:   Head: Normocephalic and atraumatic.   Nose: Nose normal.   Mouth/Throat:  Oropharynx is clear and moist.   Eyes: Conjunctivae and EOM are normal. Pupils are equal, round, and reactive to light.   Neck: Neck supple. No thyromegaly present. No tracheal deviation present. No JVD present.   Normal range of motion.  Cardiovascular:  Normal rate, regular rhythm, normal heart sounds and intact distal pulses.           No murmur heard.  Pulmonary/Chest: Breath sounds normal. No stridor. No respiratory distress. She has no wheezes. She has no rales.   Abdominal: Abdomen is soft. Bowel sounds are normal. There is abdominal tenderness.   Diffuse abdominal tenderness   Musculoskeletal:         General: No edema. Normal range of motion.      Cervical back: Normal range of motion and neck supple.     Neurological: She is alert and oriented to person, place, and time. She has normal strength. GCS score is 15. GCS eye subscore is 4. GCS verbal subscore is 5. GCS motor subscore is 6.   Skin: Skin is warm. Capillary refill takes less than 2 seconds.   Psychiatric: She has a normal mood and affect. Thought content normal.         ED Course   Procedures  Labs Reviewed   CBC W/ AUTO DIFFERENTIAL - Abnormal; Notable for the following components:       Result Value    WBC 13.82 (*)     Gran # (ANC) 9.4 (*)     Immature Grans (Abs) 0.06 (*)     All other components within normal limits   COMPREHENSIVE METABOLIC PANEL - Abnormal; Notable for the following components:    Chloride 111 (*)     CO2 21 (*)     Glucose 139 (*)     All other components within normal limits   URINALYSIS, REFLEX TO URINE CULTURE - Abnormal; Notable for the following components:    Protein, UA Trace (*)     All other components within normal limits    Narrative:     Specimen Source->Urine   DRUG SCREEN PANEL, URINE EMERGENCY - Abnormal; Notable for the following components:    THC Presumptive Positive (*)     All other components within normal limits    Narrative:     Specimen Source->Urine   LIPASE   POCT URINE PREGNANCY   ISTAT  CREATININE   POCT CREATININE          Imaging Results              CT Abdomen Pelvis With Contrast (Final result)  Result time 08/03/23 05:50:26      Final result by Dallas Chang MD (08/03/23 05:50:26)                   Narrative:    EXAM DESCRIPTION:  CT ABDOMEN PELVIS WITH CONTRAST  RadLex: CT ABDOMEN PELVIS WITH IV CONTRAST    CLINICAL HISTORY:  37 years  Female;  Abdominal abscess/infection suspected;    TECHNIQUE:  CT of the abdomen and pelvis [with] intravenous contrast.  All CT scans at this facility use dose modulation, iterative reconstruction, and/or weight based dosing when appropriate to reduce radiation dose to as low as reasonably achievable.    COMPARISON: 8/16/2020    FINDINGS:  Lung bases are clear  Abdomen:  Stomach:Within normal limits  Liver:No focal lesions. No intrahepatic ductal distention.  Gallbladder:Nondistended  Pancreas:Within normal limits  Spleen:Within normal limits  Right kidney:No hydronephrosis. No focal lesion.  Left kidney:No hydronephrosis. No focal lesion.  Adrenal glands:Within normal limits  Vascular structures:No aneurysm, dissection, or significant stenosis  Nodes:No lymphadenopathy by size criteria    Pelvis:  Small bowel:No significant distention.  Appendix:Within normal limits  Colon: Diffuse colonic wall thickening suggests infectious/inflammatory colitis.  No free intraperitoneal fluid or air.  Bones: No acute bone findings.  Bladder: Unremarkable.  No pelvic mass or adenopathy.    IMPRESSION:  1.  Infectious/inflammatory colitis        Electronically signed by:  Dallas Chang MD  8/3/2023 5:50 AM CDT Workstation: [id number]                                     Medications   lactated ringers bolus 1,000 mL (has no administration in time range)   promethazine (PHENERGAN) 12.5 mg in dextrose 5 % (D5W) 50 mL IVPB (12.5 mg Intravenous New Bag 8/3/23 0546)   levoFLOXacin 500 mg/100 mL IVPB 500 mg (has no administration in time range)   metronidazole IVPB 500 mg (has no  "administration in time range)   sodium chloride 0.9% bolus 1,000 mL 1,000 mL (1,000 mLs Intravenous New Bag 8/3/23 0341)   pantoprazole injection 40 mg (40 mg Intravenous Given 8/3/23 0336)   ondansetron injection 8 mg (8 mg Intravenous Given 8/3/23 0332)   droPERidol injection 1.25 mg (1.25 mg Intravenous Given 8/3/23 4619)   iohexoL (OMNIPAQUE 350) injection 100 mL (100 mLs Intravenous Given 8/3/23 7294)     Medical Decision Making:   Differential Diagnosis:   37-year-old female with abdominal pain and nausea vomiting and diarrhea.  Patient has symptoms consistent with gastroenteritis however I believe these symptoms are more likely secondary to hyperemesis from marijuana use.  Patient is still using marijuana.  Screening labs reviewed and CT scan of the abdomen done given persistent symptoms.  Patient did have improvement of symptoms with treatment in the emergency department.  Patient hydrated.  Patient tolerating oral fluids.  Patient however still states is nauseated.  Independently Interpreted Test(s):   I have ordered and independently interpreted X-rays - see prior notes.  Clinical Tests:   Lab Tests: Reviewed  Radiological Study: Reviewed  Sepsis Perfusion Assessment: "I attest a sepsis perfusion exam was performed within 6 hours of sepsis, severe sepsis, or septic shock presentation, following fluid resuscitation."  ED Management:  Patient while in the emergency department improved however still has persistent nausea and vomiting and pain and CT scan shows evidence of colitis.  Patient's symptoms are likely secondary to combination of colitis and marijuana induced nausea and vomiting.  Patient hydrated.  Given persistent symptoms and infection and likely needing antibiotics as well Hospital Medicine consulted for evaluation for further management.  Other:   I have discussed this case with another health care provider.       <> Summary of the Discussion: Hospitalist consulted for evaluation for further " management and treatment of intractable nausea and vomiting and abdominal pain and colitis                          Clinical Impression:   Final diagnoses:  [F12.188] Cannabis hyperemesis syndrome concurrent with and due to cannabis abuse (Primary)  [K52.9] Colitis  [R10.9] Abdominal pain, unspecified abdominal location  [R11.2] Intractable nausea and vomiting        ED Disposition Condition    Admit Stable                Clement Harrison MD  08/03/23 0579

## 2023-08-03 NOTE — H&P
[facility] - Emergency Dept  Hospital Medicine  History & Physical    Patient Name: Eva Escalera  MRN: [id number]  Patient Class: OP- Observation  Admission Date: 8/3/2023  Attending Physician: Tray Dinero MD  Primary Care Provider: [facility]         Patient information was obtained from patient, past medical records and ER records.     Subjective:     Principal Problem:Sepsis    Chief Complaint:   Chief Complaint   Patient presents with    Abdominal Pain     Beginning at midnight. N/V/D. Pt actively vomiting during triage, having diarrhea. Tried taking Bentyl with no relief.         HPI: Eva Escalera is a 37 year old female with a past medical history of CRISTINE/MDD, IBS, and THC use, who presents with one day of lower abdominal pain associated with NBNB emesis and diarrhea as well as subjective fevers and chills. She states that she has not been around recently ill friends or family members. She has had difficulty tolerating PO. She received 2 L bolus IV fluids in the ED with Levaquin and Flagyl as well as PRN antiemetics in the ED. Hospital Medicine was consulted for admission.       Past Medical History:   Diagnosis Date    Anxiety     Autoimmune disease     SEES RHEUMATOLOGIST, positve STACIE    Depression     General anesthetics causing adverse effect in therapeutic use     allergic to propofol    Herpes     Menometrorrhagia     PONV (postoperative nausea and vomiting)        Past Surgical History:   Procedure Laterality Date    ANAL SPHINCTEROTOMY N/A 2020    Procedure: SPHINCTEROTOMY, ANAL - Fissurectomy;  Surgeon: James Tian MD;  Location: [facility] OR;  Service: General;  Laterality: N/A;    BREAST RECONSTRUCTION       SECTION      COLONOSCOPY N/A 2019    Procedure: COLONOSCOPY;  Surgeon: Camacho Dove MD;  Location: [facility];  Service: Endoscopy;  Laterality: N/A;    COSMETIC SURGERY  2020     liposuction, fat graft to buttocks    DIGITAL RECTAL EXAMINATION UNDER ANESTHESIA N/A 12/23/2020    Procedure: EXAM UNDER ANESTHESIA, DIGITAL, RECTUM;  Surgeon: James Tian MD;  Location: [facility] OR;  Service: General;  Laterality: N/A;    EXAMINATION UNDER ANESTHESIA N/A 06/02/2021    Procedure: EXAM UNDER ANESTHESIA;  Surgeon: James Tian MD;  Location: [facility] OR;  Service: General;  Laterality: N/A;    FISTULOTOMY N/A 06/02/2021    Procedure: FISTULOTOMY;  Surgeon: James Tian MD;  Location: [facility] OR;  Service: General;  Laterality: N/A;    HYSTEROSCOPY WITH DILATION AND CURETTAGE OF UTERUS N/A 12/17/2018    Procedure: HYSTEROSCOPY, WITH DILATION AND CURETTAGE OF UTERUS;  Surgeon: Gloria Umaña MD;  Location: [facility] OR;  Service: OB/GYN;  Laterality: N/A;    LASER ABLATION OF THE CERVIX      LIPOMA RESECTION      LIPOSUCTION OF ABDOMEN      MYOMECTOMY N/A 12/17/2018    Procedure: MYOMECTOMY;  Surgeon: Gloria Umaña MD;  Location: [facility] OR;  Service: OB/GYN;  Laterality: N/A;  myosure REF RM- [id number]  time= 0.45  Aqulex Ref AQL-100P  [id number]       Review of patient's allergies indicates:   Allergen Reactions    Propofol analogues Nausea And Vomiting and Other (See Comments)     Stomach problems       Current Facility-Administered Medications on File Prior to Encounter   Medication    lactated ringers infusion     Current Outpatient Medications on File Prior to Encounter   Medication Sig    cholecalciferol, vitamin D3, 1,250 mcg (50,000 unit) capsule Take 50,000 Units by mouth every Wednesday.     dicyclomine (BENTYL) 10 MG capsule Take 10 mg by mouth 3 (three) times daily.    linaCLOtide (LINZESS) 145 mcg Cap capsule Take 145 mcg by mouth before breakfast.    medroxyPROGESTERone (DEPO-PROVERA) 150 mg/mL Syrg Inject 150 mg into the muscle every 3 (three) months.    gabapentin (NEURONTIN) 300 MG capsule Take 300 mg by mouth 3 (three) times daily.     ketoconazole (NIZORAL) 2 % shampoo USE TO WASH GROIN AREA EVERY OTHER DAY FOR 15 DAYS    oxyCODONE-acetaminophen (PERCOCET) 5-325 mg per tablet Take 1 tablet by mouth every 4 (four) hours as needed for Pain.    pulse oximeter (PULSE OXIMETER) device by Apply Externally route 2 (two) times a day. Use twice daily at 8 AM and 3 PM and record the value in MyChart as directed.    valacyclovir (VALTREX) 500 MG tablet Take 500 mg by mouth 2 (two) times daily as needed.     [DISCONTINUED] albuterol (VENTOLIN HFA) 90 mcg/actuation inhaler Inhale 2 puffs into the lungs every 6 (six) hours as needed for Wheezing. Rescue    [DISCONTINUED] cetirizine (ZYRTEC) 10 MG tablet Take 1 tablet (10 mg total) by mouth once daily.    [DISCONTINUED] docusate calcium (SURFAK) 240 mg capsule Take 1 capsule (240 mg total) by mouth 2 (two) times daily.    [DISCONTINUED] ergocalciferol (ERGOCALCIFEROL) 50,000 unit Cap Take 1 capsule by mouth every 7 days.    [DISCONTINUED] fluticasone propionate (FLONASE) 50 mcg/actuation nasal spray 1 spray (50 mcg total) by Each Nostril route once daily.    [DISCONTINUED] ibuprofen (ADVIL,MOTRIN) 800 MG tablet Take 800 mg by mouth 3 (three) times daily as needed for Pain.    [DISCONTINUED] ketorolac (TORADOL) 10 mg tablet Take 1 tablet (10 mg total) by mouth every 6 (six) hours.    [DISCONTINUED] medroxyPROGESTERone (DEPO-PROVERA) 150 mg/mL injection Inject 150 mg into the muscle every 3 (three) months.    [DISCONTINUED] ondansetron (ZOFRAN-ODT) 4 MG TbDL Take 1 tablet (4 mg total) by mouth every 8 (eight) hours as needed (nausea, vomiting).    [DISCONTINUED] pantoprazole (PROTONIX) 40 MG tablet Take 1 tablet (40 mg total) by mouth once daily. (Patient taking differently: Take 40 mg by mouth daily as needed. )     Family History    None       Tobacco Use    Smoking status: Some Days     Current packs/day: 0.25     Types: Cigarettes    Smokeless tobacco: Never   Substance and Sexual Activity     Alcohol use: Yes     Comment: socially    Drug use: Yes     Types: Marijuana    Sexual activity: Yes     Birth control/protection: Implant     Review of Systems   Constitutional:  Positive for appetite change and fever.   Gastrointestinal:  Positive for abdominal pain, diarrhea, nausea and vomiting. Negative for abdominal distention.     Objective:     Vital Signs (Most Recent):  Temp: 98.2 °F (36.8 °C) (08/03/23 0658)  Pulse: 77 (08/03/23 0658)  Resp: 15 (08/03/23 0658)  BP: 135/80 (08/03/23 0658)  SpO2: 97 % (08/03/23 0658) Vital Signs (24h Range):  Temp:  [98.2 °F (36.8 °C)-98.7 °F (37.1 °C)] 98.2 °F (36.8 °C)  Pulse:  [63-77] 77  Resp:  [15-20] 15  SpO2:  [96 %-100 %] 97 %  BP: (135-157)/(78-97) 135/80     Weight: 78 kg (172 lb)  Body mass index is 28.62 kg/m².     Physical Exam  Vitals and nursing note reviewed.   Constitutional:       General: She is not in acute distress.     Appearance: She is ill-appearing.   HENT:      Head: Normocephalic and atraumatic.      Right Ear: External ear normal.      Left Ear: External ear normal.      Nose: Nose normal.      Mouth/Throat:      Mouth: Mucous membranes are moist.      Pharynx: Oropharynx is clear.   Eyes:      Extraocular Movements: Extraocular movements intact.      Conjunctiva/sclera: Conjunctivae normal.   Cardiovascular:      Rate and Rhythm: Tachycardia present.      Pulses: Normal pulses.      Heart sounds: Normal heart sounds.   Pulmonary:      Effort: Pulmonary effort is normal.      Breath sounds: Normal breath sounds.   Abdominal:      General: Bowel sounds are normal. There is no distension.      Palpations: Abdomen is soft.      Tenderness: There is no abdominal tenderness.   Musculoskeletal:         General: Normal range of motion.      Cervical back: Normal range of motion and neck supple.      Right lower leg: No edema.      Left lower leg: No edema.   Skin:     General: Skin is dry.   Neurological:      Mental Status: She is alert. Mental  status is at baseline.   Psychiatric:         Mood and Affect: Mood normal.         Behavior: Behavior normal.                Significant Labs: All pertinent labs within the past 24 hours have been reviewed.    Significant Imaging: I have reviewed all pertinent imaging results/findings within the past 24 hours.    Assessment/Plan:     * Sepsis  Secondary to colitis. S/p 2 L IV bolus.  -Cipro and Flagyl  - cc/hr  -PRN antiemetics  -CLQ diet    Intractable nausea and vomiting  -Cipro and Flagyl  - cc/hr  -PRN antiemetics  -CLQ diet    Colitis  -Cipro and Flagyl  - cc/hr  -PRN antiemetics  -CLQ diet    Tetrahydrocannabinol (THC) dependence  -Patient to be counseled cessation      Tobacco use  Patient states she is no longer smoking.      Irritable bowel syndrome  -Hold home Linzess        VTE Risk Mitigation (From admission, onward)    None             On 08/03/2023, patient should be placed in hospital observation services under my care.        Tray Dinero MD  Department of Hospital Medicine  [facility] - Emergency Dept

## 2023-08-03 NOTE — SUBJECTIVE & OBJECTIVE
Past Medical History:   Diagnosis Date    Anxiety     Autoimmune disease     SEES RHEUMATOLOGIST, positve STACIE    Depression     General anesthetics causing adverse effect in therapeutic use     allergic to propofol    Herpes     Menometrorrhagia     PONV (postoperative nausea and vomiting)        Past Surgical History:   Procedure Laterality Date    ANAL SPHINCTEROTOMY N/A 2020    Procedure: SPHINCTEROTOMY, ANAL - Fissurectomy;  Surgeon: James Tian MD;  Location: [facility] OR;  Service: General;  Laterality: N/A;    BREAST RECONSTRUCTION       SECTION      COLONOSCOPY N/A 2019    Procedure: COLONOSCOPY;  Surgeon: Camacho Dove MD;  Location: [facility] ENDO;  Service: Endoscopy;  Laterality: N/A;    COSMETIC SURGERY  2020    liposuction, fat graft to buttocks    DIGITAL RECTAL EXAMINATION UNDER ANESTHESIA N/A 2020    Procedure: EXAM UNDER ANESTHESIA, DIGITAL, RECTUM;  Surgeon: James Tian MD;  Location: [facility] OR;  Service: General;  Laterality: N/A;    EXAMINATION UNDER ANESTHESIA N/A 2021    Procedure: EXAM UNDER ANESTHESIA;  Surgeon: James Tian MD;  Location: [facility] OR;  Service: General;  Laterality: N/A;    FISTULOTOMY N/A 2021    Procedure: FISTULOTOMY;  Surgeon: James Tian MD;  Location: [facility] OR;  Service: General;  Laterality: N/A;    HYSTEROSCOPY WITH DILATION AND CURETTAGE OF UTERUS N/A 2018    Procedure: HYSTEROSCOPY, WITH DILATION AND CURETTAGE OF UTERUS;  Surgeon: Gloria Umaña MD;  Location: [facility] OR;  Service: OB/GYN;  Laterality: N/A;    LASER ABLATION OF THE CERVIX      LIPOMA RESECTION      LIPOSUCTION OF ABDOMEN      MYOMECTOMY N/A 2018    Procedure: MYOMECTOMY;  Surgeon: Gloria Umaña MD;  Location: [facility] OR;  Service: OB/GYN;  Laterality: N/A;  myosure REF RM- [id number]  time= 0.45  Aqulex Ref AQL-100P  [id number]       Review of patient's allergies indicates:   Allergen Reactions     Propofol analogues Nausea And Vomiting and Other (See Comments)     Stomach problems       Current Facility-Administered Medications on File Prior to Encounter   Medication    lactated ringers infusion     Current Outpatient Medications on File Prior to Encounter   Medication Sig    cholecalciferol, vitamin D3, 1,250 mcg (50,000 unit) capsule Take 50,000 Units by mouth every Wednesday.     dicyclomine (BENTYL) 10 MG capsule Take 10 mg by mouth 3 (three) times daily.    linaCLOtide (LINZESS) 145 mcg Cap capsule Take 145 mcg by mouth before breakfast.    medroxyPROGESTERone (DEPO-PROVERA) 150 mg/mL Syrg Inject 150 mg into the muscle every 3 (three) months.    gabapentin (NEURONTIN) 300 MG capsule Take 300 mg by mouth 3 (three) times daily.    ketoconazole (NIZORAL) 2 % shampoo USE TO WASH GROIN AREA EVERY OTHER DAY FOR 15 DAYS    oxyCODONE-acetaminophen (PERCOCET) 5-325 mg per tablet Take 1 tablet by mouth every 4 (four) hours as needed for Pain.    pulse oximeter (PULSE OXIMETER) device by Apply Externally route 2 (two) times a day. Use twice daily at 8 AM and 3 PM and record the value in [employer] as directed.    valacyclovir (VALTREX) 500 MG tablet Take 500 mg by mouth 2 (two) times daily as needed.     [DISCONTINUED] albuterol (VENTOLIN HFA) 90 mcg/actuation inhaler Inhale 2 puffs into the lungs every 6 (six) hours as needed for Wheezing. Rescue    [DISCONTINUED] cetirizine (ZYRTEC) 10 MG tablet Take 1 tablet (10 mg total) by mouth once daily.    [DISCONTINUED] docusate calcium (SURFAK) 240 mg capsule Take 1 capsule (240 mg total) by mouth 2 (two) times daily.    [DISCONTINUED] ergocalciferol (ERGOCALCIFEROL) 50,000 unit Cap Take 1 capsule by mouth every 7 days.    [DISCONTINUED] fluticasone propionate (FLONASE) 50 mcg/actuation nasal spray 1 spray (50 mcg total) by Each Nostril route once daily.    [DISCONTINUED] ibuprofen (ADVIL,MOTRIN) 800 MG tablet Take 800 mg by mouth 3 (three) times daily as needed for Pain.     [DISCONTINUED] ketorolac (TORADOL) 10 mg tablet Take 1 tablet (10 mg total) by mouth every 6 (six) hours.    [DISCONTINUED] medroxyPROGESTERone (DEPO-PROVERA) 150 mg/mL injection Inject 150 mg into the muscle every 3 (three) months.    [DISCONTINUED] ondansetron (ZOFRAN-ODT) 4 MG TbDL Take 1 tablet (4 mg total) by mouth every 8 (eight) hours as needed (nausea, vomiting).    [DISCONTINUED] pantoprazole (PROTONIX) 40 MG tablet Take 1 tablet (40 mg total) by mouth once daily. (Patient taking differently: Take 40 mg by mouth daily as needed. )     Family History    None       Tobacco Use    Smoking status: Some Days     Current packs/day: 0.25     Types: Cigarettes    Smokeless tobacco: Never   Substance and Sexual Activity    Alcohol use: Yes     Comment: socially    Drug use: Yes     Types: Marijuana    Sexual activity: Yes     Birth control/protection: Implant     Review of Systems   Constitutional:  Positive for appetite change and fever.   Gastrointestinal:  Positive for abdominal pain, diarrhea, nausea and vomiting. Negative for abdominal distention.     Objective:     Vital Signs (Most Recent):  Temp: 98.2 °F (36.8 °C) (08/03/23 0658)  Pulse: 77 (08/03/23 0658)  Resp: 15 (08/03/23 0658)  BP: 135/80 (08/03/23 0658)  SpO2: 97 % (08/03/23 0658) Vital Signs (24h Range):  Temp:  [98.2 °F (36.8 °C)-98.7 °F (37.1 °C)] 98.2 °F (36.8 °C)  Pulse:  [63-77] 77  Resp:  [15-20] 15  SpO2:  [96 %-100 %] 97 %  BP: (135-157)/(78-97) 135/80     Weight: 78 kg (172 lb)  Body mass index is 28.62 kg/m².     Physical Exam  Vitals and nursing note reviewed.   Constitutional:       General: She is not in acute distress.     Appearance: She is ill-appearing.   HENT:      Head: Normocephalic and atraumatic.      Right Ear: External ear normal.      Left Ear: External ear normal.      Nose: Nose normal.      Mouth/Throat:      Mouth: Mucous membranes are moist.      Pharynx: Oropharynx is clear.   Eyes:      Extraocular Movements:  Extraocular movements intact.      Conjunctiva/sclera: Conjunctivae normal.   Cardiovascular:      Rate and Rhythm: Tachycardia present.      Pulses: Normal pulses.      Heart sounds: Normal heart sounds.   Pulmonary:      Effort: Pulmonary effort is normal.      Breath sounds: Normal breath sounds.   Abdominal:      General: Bowel sounds are normal. There is no distension.      Palpations: Abdomen is soft.      Tenderness: There is no abdominal tenderness.   Musculoskeletal:         General: Normal range of motion.      Cervical back: Normal range of motion and neck supple.      Right lower leg: No edema.      Left lower leg: No edema.   Skin:     General: Skin is dry.   Neurological:      Mental Status: She is alert. Mental status is at baseline.   Psychiatric:         Mood and Affect: Mood normal.         Behavior: Behavior normal.                Significant Labs: All pertinent labs within the past 24 hours have been reviewed.    Significant Imaging: I have reviewed all pertinent imaging results/findings within the past 24 hours.

## 2023-08-04 LAB
ALBUMIN SERPL BCP-MCNC: 4 G/DL (ref 3.5–5.2)
ALP SERPL-CCNC: 56 U/L (ref 55–135)
ALT SERPL W/O P-5'-P-CCNC: 20 U/L (ref 10–44)
ANION GAP SERPL CALC-SCNC: 7 MMOL/L (ref 8–16)
AST SERPL-CCNC: 20 U/L (ref 10–40)
BASOPHILS # BLD AUTO: 0.01 K/UL (ref 0–0.2)
BASOPHILS NFR BLD: 0.1 % (ref 0–1.9)
BILIRUB SERPL-MCNC: 0.7 MG/DL (ref 0.1–1)
BUN SERPL-MCNC: 7 MG/DL (ref 6–20)
CALCIUM SERPL-MCNC: 8.9 MG/DL (ref 8.7–10.5)
CHLORIDE SERPL-SCNC: 101 MMOL/L (ref 95–110)
CO2 SERPL-SCNC: 22 MMOL/L (ref 23–29)
CREAT SERPL-MCNC: 0.8 MG/DL (ref 0.5–1.4)
DIFFERENTIAL METHOD: ABNORMAL
EOSINOPHIL # BLD AUTO: 0 K/UL (ref 0–0.5)
EOSINOPHIL NFR BLD: 0 % (ref 0–8)
ERYTHROCYTE [DISTWIDTH] IN BLOOD BY AUTOMATED COUNT: 13.1 % (ref 11.5–14.5)
EST. GFR  (NO RACE VARIABLE): >60 ML/MIN/1.73 M^2
GLUCOSE SERPL-MCNC: 114 MG/DL (ref 70–110)
HCT VFR BLD AUTO: 39.9 % (ref 37–48.5)
HGB BLD-MCNC: 13.9 G/DL (ref 12–16)
IMM GRANULOCYTES # BLD AUTO: 0.05 K/UL (ref 0–0.04)
IMM GRANULOCYTES NFR BLD AUTO: 0.4 % (ref 0–0.5)
LYMPHOCYTES # BLD AUTO: 1.5 K/UL (ref 1–4.8)
LYMPHOCYTES NFR BLD: 13.4 % (ref 18–48)
MAGNESIUM SERPL-MCNC: 1.8 MG/DL (ref 1.6–2.6)
MCH RBC QN AUTO: 29.8 PG (ref 27–31)
MCHC RBC AUTO-ENTMCNC: 34.8 G/DL (ref 32–36)
MCV RBC AUTO: 86 FL (ref 82–98)
MONOCYTES # BLD AUTO: 1.2 K/UL (ref 0.3–1)
MONOCYTES NFR BLD: 10.2 % (ref 4–15)
NEUTROPHILS # BLD AUTO: 8.6 K/UL (ref 1.8–7.7)
NEUTROPHILS NFR BLD: 75.9 % (ref 38–73)
NRBC BLD-RTO: 0 /100 WBC
PHOSPHATE SERPL-MCNC: 3.6 MG/DL (ref 2.7–4.5)
PLATELET # BLD AUTO: 277 K/UL (ref 150–450)
PMV BLD AUTO: 9.6 FL (ref 9.2–12.9)
POTASSIUM SERPL-SCNC: 3 MMOL/L (ref 3.5–5.1)
PROT SERPL-MCNC: 7.8 G/DL (ref 6–8.4)
RBC # BLD AUTO: 4.66 M/UL (ref 4–5.4)
SODIUM SERPL-SCNC: 130 MMOL/L (ref 136–145)
WBC # BLD AUTO: 11.39 K/UL (ref 3.9–12.7)

## 2023-08-04 PROCEDURE — 80053 COMPREHEN METABOLIC PANEL: CPT | Performed by: STUDENT IN AN ORGANIZED HEALTH CARE EDUCATION/TRAINING PROGRAM

## 2023-08-04 PROCEDURE — 85025 COMPLETE CBC W/AUTO DIFF WBC: CPT | Performed by: STUDENT IN AN ORGANIZED HEALTH CARE EDUCATION/TRAINING PROGRAM

## 2023-08-04 PROCEDURE — 63600175 PHARM REV CODE 636 W HCPCS: Performed by: NURSE PRACTITIONER

## 2023-08-04 PROCEDURE — 96376 TX/PRO/DX INJ SAME DRUG ADON: CPT

## 2023-08-04 PROCEDURE — 96366 THER/PROPH/DIAG IV INF ADDON: CPT

## 2023-08-04 PROCEDURE — 12000002 HC ACUTE/MED SURGE SEMI-PRIVATE ROOM

## 2023-08-04 PROCEDURE — 83735 ASSAY OF MAGNESIUM: CPT | Performed by: STUDENT IN AN ORGANIZED HEALTH CARE EDUCATION/TRAINING PROGRAM

## 2023-08-04 PROCEDURE — 25000003 PHARM REV CODE 250: Performed by: NURSE PRACTITIONER

## 2023-08-04 PROCEDURE — G0378 HOSPITAL OBSERVATION PER HR: HCPCS

## 2023-08-04 PROCEDURE — 25000003 PHARM REV CODE 250: Performed by: STUDENT IN AN ORGANIZED HEALTH CARE EDUCATION/TRAINING PROGRAM

## 2023-08-04 PROCEDURE — 36415 COLL VENOUS BLD VENIPUNCTURE: CPT | Performed by: STUDENT IN AN ORGANIZED HEALTH CARE EDUCATION/TRAINING PROGRAM

## 2023-08-04 PROCEDURE — 63600175 PHARM REV CODE 636 W HCPCS: Performed by: INTERNAL MEDICINE

## 2023-08-04 PROCEDURE — 25000003 PHARM REV CODE 250: Performed by: INTERNAL MEDICINE

## 2023-08-04 PROCEDURE — 84100 ASSAY OF PHOSPHORUS: CPT | Performed by: STUDENT IN AN ORGANIZED HEALTH CARE EDUCATION/TRAINING PROGRAM

## 2023-08-04 PROCEDURE — 63600175 PHARM REV CODE 636 W HCPCS: Performed by: STUDENT IN AN ORGANIZED HEALTH CARE EDUCATION/TRAINING PROGRAM

## 2023-08-04 RX ORDER — SODIUM,POTASSIUM PHOSPHATES 280-250MG
2 POWDER IN PACKET (EA) ORAL
Status: DISCONTINUED | OUTPATIENT
Start: 2023-08-04 | End: 2023-08-06 | Stop reason: HOSPADM

## 2023-08-04 RX ORDER — LANOLIN ALCOHOL/MO/W.PET/CERES
800 CREAM (GRAM) TOPICAL
Status: DISCONTINUED | OUTPATIENT
Start: 2023-08-04 | End: 2023-08-06 | Stop reason: HOSPADM

## 2023-08-04 RX ORDER — SODIUM CHLORIDE 9 MG/ML
INJECTION, SOLUTION INTRAVENOUS CONTINUOUS
Status: DISCONTINUED | OUTPATIENT
Start: 2023-08-04 | End: 2023-08-05

## 2023-08-04 RX ORDER — ONDANSETRON 2 MG/ML
4 INJECTION INTRAMUSCULAR; INTRAVENOUS EVERY 6 HOURS PRN
Status: DISCONTINUED | OUTPATIENT
Start: 2023-08-04 | End: 2023-08-06 | Stop reason: HOSPADM

## 2023-08-04 RX ADMIN — METRONIDAZOLE 500 MG: 500 INJECTION, SOLUTION INTRAVENOUS at 01:08

## 2023-08-04 RX ADMIN — SODIUM CHLORIDE: 0.9 INJECTION, SOLUTION INTRAVENOUS at 09:08

## 2023-08-04 RX ADMIN — Medication 9 MG: at 12:08

## 2023-08-04 RX ADMIN — LORAZEPAM 0.5 MG: 2 INJECTION INTRAMUSCULAR at 03:08

## 2023-08-04 RX ADMIN — METRONIDAZOLE 500 MG: 500 INJECTION, SOLUTION INTRAVENOUS at 08:08

## 2023-08-04 RX ADMIN — ACETAMINOPHEN 650 MG: 325 TABLET ORAL at 04:08

## 2023-08-04 RX ADMIN — ONDANSETRON 4 MG: 2 INJECTION INTRAMUSCULAR; INTRAVENOUS at 10:08

## 2023-08-04 RX ADMIN — PROMETHAZINE HYDROCHLORIDE 12.5 MG: 25 INJECTION INTRAMUSCULAR; INTRAVENOUS at 12:08

## 2023-08-04 RX ADMIN — Medication 800 MG: at 09:08

## 2023-08-04 RX ADMIN — ONDANSETRON 4 MG: 2 INJECTION INTRAMUSCULAR; INTRAVENOUS at 03:08

## 2023-08-04 RX ADMIN — Medication 9 MG: at 10:08

## 2023-08-04 RX ADMIN — LORAZEPAM 0.5 MG: 2 INJECTION INTRAMUSCULAR at 08:08

## 2023-08-04 RX ADMIN — CIPROFLOXACIN 400 MG: 2 INJECTION, SOLUTION INTRAVENOUS at 06:08

## 2023-08-04 RX ADMIN — SODIUM CHLORIDE: 0.9 INJECTION, SOLUTION INTRAVENOUS at 06:08

## 2023-08-04 RX ADMIN — POTASSIUM BICARBONATE 60 MEQ: 391 TABLET, EFFERVESCENT ORAL at 09:08

## 2023-08-04 RX ADMIN — METRONIDAZOLE 500 MG: 500 INJECTION, SOLUTION INTRAVENOUS at 04:08

## 2023-08-04 RX ADMIN — SODIUM CHLORIDE, SODIUM LACTATE, POTASSIUM CHLORIDE, AND CALCIUM CHLORIDE: .6; .31; .03; .02 INJECTION, SOLUTION INTRAVENOUS at 03:08

## 2023-08-04 NOTE — PROGRESS NOTES
[facility]  Progress Note    Patient Name: Eva Escalera  MRN: [id number]  Patient Class: OP- Observation   Admission Date: 8/3/2023  Length of Stay: 0 days  Attending Physician: Wilfredo Michaud MD  Primary Care Provider: [facility]        Subjective:     Principal Problem:Sepsis        HPI:  Eva Escalera is a 37 year old female with a past medical history of CRISTINE/MDD, IBS, and THC use, who presents with one day of lower abdominal pain associated with NBNB emesis and diarrhea as well as subjective fevers and chills. She states that she has not been around recently ill friends or family members. She has had difficulty tolerating PO. She received 2 L bolus IV fluids in the ED with Levaquin and Flagyl as well as PRN antiemetics in the ED. Hospital Medicine was consulted for admission.       Overview/Hospital Course:  No notes on file    Interval History: Notes reviewed, no acute events overnight. Pt reports feeling fatigue and weak. She is still experiencing nausea and lower abd discomfort. She denies vomiting or diarrhea since arriving to floor. She is tolerating clear liquids. Will cont to monitor closely and cont supportive care.     Review of Systems   Constitutional:  Positive for activity change, appetite change and fatigue. Negative for chills and fever.   HENT:  Negative for congestion, sore throat and trouble swallowing.    Respiratory:  Negative for cough and shortness of breath.    Cardiovascular:  Negative for chest pain.   Gastrointestinal:  Positive for abdominal pain and nausea. Negative for diarrhea and vomiting.   Genitourinary:  Negative for difficulty urinating, dysuria and urgency.   Musculoskeletal:  Negative for arthralgias, back pain and gait problem.   Skin:  Negative for color change, pallor, rash and wound.   Neurological:  Negative for dizziness, weakness and light-headedness.   Psychiatric/Behavioral:  Negative for  agitation, behavioral problems and confusion.    All other systems reviewed and are negative.    Objective:     Vital Signs (Most Recent):  Temp: 98.6 °F (37 °C) (08/04/23 1100)  Pulse: 71 (08/04/23 1100)  Resp: 18 (08/04/23 1100)  BP: 139/76 (08/04/23 1100)  SpO2: 98 % (08/04/23 1100) Vital Signs (24h Range):  Temp:  [98.1 °F (36.7 °C)-99.5 °F (37.5 °C)] 98.6 °F (37 °C)  Pulse:  [71-92] 71  Resp:  [17-18] 18  SpO2:  [96 %-100 %] 98 %  BP: (116-139)/(65-84) 139/76     Weight: 78 kg (171 lb 15.3 oz)  Body mass index is 28.18 kg/m².    Intake/Output Summary (Last 24 hours) at 8/4/2023 1510  Last data filed at 8/4/2023 1258  Gross per 24 hour   Intake 560 ml   Output 450 ml   Net 110 ml         Physical Exam  Vitals and nursing note reviewed.   Constitutional:       General: She is not in acute distress.     Appearance: Normal appearance. She is well-developed. She is not ill-appearing or diaphoretic.   HENT:      Head: Normocephalic and atraumatic.      Right Ear: External ear normal.      Left Ear: External ear normal.      Nose: Nose normal. No congestion or rhinorrhea.      Mouth/Throat:      Mouth: Mucous membranes are dry.      Pharynx: Oropharynx is clear. No oropharyngeal exudate or posterior oropharyngeal erythema.   Eyes:      General: No scleral icterus.     Conjunctiva/sclera: Conjunctivae normal.      Pupils: Pupils are equal, round, and reactive to light.   Neck:      Vascular: No JVD.   Cardiovascular:      Rate and Rhythm: Normal rate and regular rhythm.      Pulses: Normal pulses.      Heart sounds: Normal heart sounds. No murmur heard.  Pulmonary:      Effort: Pulmonary effort is normal. No respiratory distress.      Breath sounds: Normal breath sounds. No stridor. No wheezing, rhonchi or rales.   Abdominal:      General: Bowel sounds are normal. There is no distension.      Palpations: Abdomen is soft.      Tenderness: There is abdominal tenderness.      Comments: Generalized lower abd discomfort    Musculoskeletal:         General: No swelling or tenderness. Normal range of motion.      Cervical back: Normal range of motion and neck supple.   Skin:     General: Skin is warm and dry.      Capillary Refill: Capillary refill takes 2 to 3 seconds.      Coloration: Skin is not jaundiced or pale.      Findings: No erythema.   Neurological:      General: No focal deficit present.      Mental Status: She is alert and oriented to person, place, and time.      Cranial Nerves: No cranial nerve deficit.      Sensory: No sensory deficit.   Psychiatric:         Mood and Affect: Mood normal.         Behavior: Behavior normal.         Thought Content: Thought content normal.             Significant Labs: All pertinent labs within the past 24 hours have been reviewed.  CBC:   Recent Labs   Lab 08/03/23  0342 08/04/23  0500   WBC 13.82* 11.39   HGB 14.3 13.9   HCT 42.3 39.9    277     CMP:   Recent Labs   Lab 08/03/23  0342 08/04/23  0500    130*   K 3.6 3.0*   * 101   CO2 21* 22*   * 114*   BUN 9 7   CREATININE 0.9 0.8   CALCIUM 9.2 8.9   PROT 8.0 7.8   ALBUMIN 4.1 4.0   BILITOT 0.2 0.7   ALKPHOS 74 56   AST 25 20   ALT 21 20   ANIONGAP 8 7*       Significant Imaging: I have reviewed all pertinent imaging results/findings within the past 24 hours.      Assessment/Plan:      * Sepsis  Secondary to colitis. S/p 2 L IV bolus.  -Cipro and Flagyl  -cont IVFs switch LR to  cc/hr  -PRN antiemetics  -CLQ diet    Tetrahydrocannabinol (THC) dependence  -Patient to be counseled cessation      Tobacco use  Patient states she is no longer smoking.      Irritable bowel syndrome  -Hold home Linzess      Intractable nausea and vomiting  - cc/hr  -PRN antiemetics  -CLQ diet    Colitis  -CT reviewed  -Cipro and Flagyl  - cc/hr  -PRN antiemetics  -CLQ diet      VTE Risk Mitigation (From admission, onward)         Ordered     IP VTE LOW RISK PATIENT  Once         08/03/23 0828     Place sequential  compression device  Until discontinued         08/03/23 0828                Discharge Planning   CHEKO: 8/5/2023     Code Status: Full Code   Is the patient medically ready for discharge?:     Reason for patient still in hospital (select all that apply): Patient trending condition and Treatment  Discharge Plan A: Home                  Kenzie Watson NP  Department of Hospital Medicine   [facility]

## 2023-08-04 NOTE — SUBJECTIVE & OBJECTIVE
Interval History: Notes reviewed, no acute events overnight. Pt reports feeling fatigue and weak. She is still experiencing nausea and lower abd discomfort. She denies vomiting or diarrhea since arriving to floor. She is tolerating clear liquids. Will cont to monitor closely and cont supportive care.     Review of Systems   Constitutional:  Positive for activity change, appetite change and fatigue. Negative for chills and fever.   HENT:  Negative for congestion, sore throat and trouble swallowing.    Respiratory:  Negative for cough and shortness of breath.    Cardiovascular:  Negative for chest pain.   Gastrointestinal:  Positive for abdominal pain and nausea. Negative for diarrhea and vomiting.   Genitourinary:  Negative for difficulty urinating, dysuria and urgency.   Musculoskeletal:  Negative for arthralgias, back pain and gait problem.   Skin:  Negative for color change, pallor, rash and wound.   Neurological:  Negative for dizziness, weakness and light-headedness.   Psychiatric/Behavioral:  Negative for agitation, behavioral problems and confusion.    All other systems reviewed and are negative.    Objective:     Vital Signs (Most Recent):  Temp: 98.6 °F (37 °C) (08/04/23 1100)  Pulse: 71 (08/04/23 1100)  Resp: 18 (08/04/23 1100)  BP: 139/76 (08/04/23 1100)  SpO2: 98 % (08/04/23 1100) Vital Signs (24h Range):  Temp:  [98.1 °F (36.7 °C)-99.5 °F (37.5 °C)] 98.6 °F (37 °C)  Pulse:  [71-92] 71  Resp:  [17-18] 18  SpO2:  [96 %-100 %] 98 %  BP: (116-139)/(65-84) 139/76     Weight: 78 kg (171 lb 15.3 oz)  Body mass index is 28.18 kg/m².    Intake/Output Summary (Last 24 hours) at 8/4/2023 1510  Last data filed at 8/4/2023 1258  Gross per 24 hour   Intake 560 ml   Output 450 ml   Net 110 ml         Physical Exam  Vitals and nursing note reviewed.   Constitutional:       General: She is not in acute distress.     Appearance: Normal appearance. She is well-developed. She is not ill-appearing or diaphoretic.   HENT:       Head: Normocephalic and atraumatic.      Right Ear: External ear normal.      Left Ear: External ear normal.      Nose: Nose normal. No congestion or rhinorrhea.      Mouth/Throat:      Mouth: Mucous membranes are dry.      Pharynx: Oropharynx is clear. No oropharyngeal exudate or posterior oropharyngeal erythema.   Eyes:      General: No scleral icterus.     Conjunctiva/sclera: Conjunctivae normal.      Pupils: Pupils are equal, round, and reactive to light.   Neck:      Vascular: No JVD.   Cardiovascular:      Rate and Rhythm: Normal rate and regular rhythm.      Pulses: Normal pulses.      Heart sounds: Normal heart sounds. No murmur heard.  Pulmonary:      Effort: Pulmonary effort is normal. No respiratory distress.      Breath sounds: Normal breath sounds. No stridor. No wheezing, rhonchi or rales.   Abdominal:      General: Bowel sounds are normal. There is no distension.      Palpations: Abdomen is soft.      Tenderness: There is abdominal tenderness.      Comments: Generalized lower abd discomfort   Musculoskeletal:         General: No swelling or tenderness. Normal range of motion.      Cervical back: Normal range of motion and neck supple.   Skin:     General: Skin is warm and dry.      Capillary Refill: Capillary refill takes 2 to 3 seconds.      Coloration: Skin is not jaundiced or pale.      Findings: No erythema.   Neurological:      General: No focal deficit present.      Mental Status: She is alert and oriented to person, place, and time.      Cranial Nerves: No cranial nerve deficit.      Sensory: No sensory deficit.   Psychiatric:         Mood and Affect: Mood normal.         Behavior: Behavior normal.         Thought Content: Thought content normal.             Significant Labs: All pertinent labs within the past 24 hours have been reviewed.  CBC:   Recent Labs   Lab 08/03/23  0342 08/04/23  0500   WBC 13.82* 11.39   HGB 14.3 13.9   HCT 42.3 39.9    277     CMP:   Recent Labs   Lab  08/03/23  0342 08/04/23  0500    130*   K 3.6 3.0*   * 101   CO2 21* 22*   * 114*   BUN 9 7   CREATININE 0.9 0.8   CALCIUM 9.2 8.9   PROT 8.0 7.8   ALBUMIN 4.1 4.0   BILITOT 0.2 0.7   ALKPHOS 74 56   AST 25 20   ALT 21 20   ANIONGAP 8 7*       Significant Imaging: I have reviewed all pertinent imaging results/findings within the past 24 hours.

## 2023-08-04 NOTE — ASSESSMENT & PLAN NOTE
Secondary to colitis. S/p 2 L IV bolus.  -Cipro and Flagyl  -cont IVFs switch LR to  cc/hr  -PRN antiemetics  -CLQ diet

## 2023-08-05 LAB
ALBUMIN SERPL BCP-MCNC: 3.1 G/DL (ref 3.5–5.2)
ALP SERPL-CCNC: 42 U/L (ref 55–135)
ALT SERPL W/O P-5'-P-CCNC: 17 U/L (ref 10–44)
ANION GAP SERPL CALC-SCNC: -1 MMOL/L (ref 8–16)
ANION GAP SERPL CALC-SCNC: 3 MMOL/L (ref 8–16)
ANION GAP SERPL CALC-SCNC: 8 MMOL/L (ref 8–16)
AST SERPL-CCNC: 18 U/L (ref 10–40)
BASOPHILS # BLD AUTO: 0.01 K/UL (ref 0–0.2)
BASOPHILS NFR BLD: 0.1 % (ref 0–1.9)
BILIRUB SERPL-MCNC: 0.8 MG/DL (ref 0.1–1)
BUN SERPL-MCNC: 5 MG/DL (ref 6–20)
BUN SERPL-MCNC: 5 MG/DL (ref 6–20)
BUN SERPL-MCNC: <5 MG/DL (ref 6–20)
CALCIUM SERPL-MCNC: 7 MG/DL (ref 8.7–10.5)
CALCIUM SERPL-MCNC: 8.1 MG/DL (ref 8.7–10.5)
CALCIUM SERPL-MCNC: 9 MG/DL (ref 8.7–10.5)
CHLORIDE SERPL-SCNC: 102 MMOL/L (ref 95–110)
CHLORIDE SERPL-SCNC: 105 MMOL/L (ref 95–110)
CHLORIDE SERPL-SCNC: 116 MMOL/L (ref 95–110)
CO2 SERPL-SCNC: 20 MMOL/L (ref 23–29)
CO2 SERPL-SCNC: 21 MMOL/L (ref 23–29)
CO2 SERPL-SCNC: 23 MMOL/L (ref 23–29)
CREAT SERPL-MCNC: 0.6 MG/DL (ref 0.5–1.4)
CREAT SERPL-MCNC: 0.8 MG/DL (ref 0.5–1.4)
CREAT SERPL-MCNC: 0.8 MG/DL (ref 0.5–1.4)
DIFFERENTIAL METHOD: ABNORMAL
EOSINOPHIL # BLD AUTO: 0 K/UL (ref 0–0.5)
EOSINOPHIL NFR BLD: 0 % (ref 0–8)
ERYTHROCYTE [DISTWIDTH] IN BLOOD BY AUTOMATED COUNT: 12.6 % (ref 11.5–14.5)
EST. GFR  (NO RACE VARIABLE): >60 ML/MIN/1.73 M^2
GLUCOSE SERPL-MCNC: 113 MG/DL (ref 70–110)
GLUCOSE SERPL-MCNC: 136 MG/DL (ref 70–110)
GLUCOSE SERPL-MCNC: 91 MG/DL (ref 70–110)
HCT VFR BLD AUTO: 36.5 % (ref 37–48.5)
HGB BLD-MCNC: 12.5 G/DL (ref 12–16)
IMM GRANULOCYTES # BLD AUTO: 0.04 K/UL (ref 0–0.04)
IMM GRANULOCYTES NFR BLD AUTO: 0.4 % (ref 0–0.5)
LYMPHOCYTES # BLD AUTO: 2 K/UL (ref 1–4.8)
LYMPHOCYTES NFR BLD: 19.9 % (ref 18–48)
MAGNESIUM SERPL-MCNC: 1.5 MG/DL (ref 1.6–2.6)
MCH RBC QN AUTO: 29.8 PG (ref 27–31)
MCHC RBC AUTO-ENTMCNC: 34.2 G/DL (ref 32–36)
MCV RBC AUTO: 87 FL (ref 82–98)
MONOCYTES # BLD AUTO: 1 K/UL (ref 0.3–1)
MONOCYTES NFR BLD: 10.1 % (ref 4–15)
NEUTROPHILS # BLD AUTO: 6.8 K/UL (ref 1.8–7.7)
NEUTROPHILS NFR BLD: 69.5 % (ref 38–73)
NRBC BLD-RTO: 0 /100 WBC
PHOSPHATE SERPL-MCNC: 2.4 MG/DL (ref 2.7–4.5)
PLATELET # BLD AUTO: 246 K/UL (ref 150–450)
PMV BLD AUTO: 9.8 FL (ref 9.2–12.9)
POTASSIUM SERPL-SCNC: 2.4 MMOL/L (ref 3.5–5.1)
POTASSIUM SERPL-SCNC: 2.9 MMOL/L (ref 3.5–5.1)
POTASSIUM SERPL-SCNC: 3.8 MMOL/L (ref 3.5–5.1)
PROT SERPL-MCNC: 6 G/DL (ref 6–8.4)
RBC # BLD AUTO: 4.2 M/UL (ref 4–5.4)
SODIUM SERPL-SCNC: 129 MMOL/L (ref 136–145)
SODIUM SERPL-SCNC: 133 MMOL/L (ref 136–145)
SODIUM SERPL-SCNC: 135 MMOL/L (ref 136–145)
WBC # BLD AUTO: 9.83 K/UL (ref 3.9–12.7)

## 2023-08-05 PROCEDURE — 12000002 HC ACUTE/MED SURGE SEMI-PRIVATE ROOM

## 2023-08-05 PROCEDURE — 80048 BASIC METABOLIC PNL TOTAL CA: CPT | Performed by: NURSE PRACTITIONER

## 2023-08-05 PROCEDURE — 63600175 PHARM REV CODE 636 W HCPCS: Performed by: INTERNAL MEDICINE

## 2023-08-05 PROCEDURE — 36415 COLL VENOUS BLD VENIPUNCTURE: CPT | Performed by: STUDENT IN AN ORGANIZED HEALTH CARE EDUCATION/TRAINING PROGRAM

## 2023-08-05 PROCEDURE — 84100 ASSAY OF PHOSPHORUS: CPT | Performed by: STUDENT IN AN ORGANIZED HEALTH CARE EDUCATION/TRAINING PROGRAM

## 2023-08-05 PROCEDURE — 96366 THER/PROPH/DIAG IV INF ADDON: CPT

## 2023-08-05 PROCEDURE — 25000003 PHARM REV CODE 250: Performed by: NURSE PRACTITIONER

## 2023-08-05 PROCEDURE — 80053 COMPREHEN METABOLIC PANEL: CPT | Performed by: STUDENT IN AN ORGANIZED HEALTH CARE EDUCATION/TRAINING PROGRAM

## 2023-08-05 PROCEDURE — 83735 ASSAY OF MAGNESIUM: CPT | Performed by: STUDENT IN AN ORGANIZED HEALTH CARE EDUCATION/TRAINING PROGRAM

## 2023-08-05 PROCEDURE — 96376 TX/PRO/DX INJ SAME DRUG ADON: CPT

## 2023-08-05 PROCEDURE — 25000003 PHARM REV CODE 250: Performed by: STUDENT IN AN ORGANIZED HEALTH CARE EDUCATION/TRAINING PROGRAM

## 2023-08-05 PROCEDURE — 85025 COMPLETE CBC W/AUTO DIFF WBC: CPT | Performed by: STUDENT IN AN ORGANIZED HEALTH CARE EDUCATION/TRAINING PROGRAM

## 2023-08-05 PROCEDURE — 63600175 PHARM REV CODE 636 W HCPCS: Performed by: STUDENT IN AN ORGANIZED HEALTH CARE EDUCATION/TRAINING PROGRAM

## 2023-08-05 PROCEDURE — 63600175 PHARM REV CODE 636 W HCPCS: Performed by: NURSE PRACTITIONER

## 2023-08-05 PROCEDURE — 36415 COLL VENOUS BLD VENIPUNCTURE: CPT | Performed by: NURSE PRACTITIONER

## 2023-08-05 RX ORDER — POTASSIUM CHLORIDE 20 MEQ/1
60 TABLET, EXTENDED RELEASE ORAL ONCE
Status: COMPLETED | OUTPATIENT
Start: 2023-08-05 | End: 2023-08-05

## 2023-08-05 RX ORDER — MAGNESIUM SULFATE HEPTAHYDRATE 40 MG/ML
2 INJECTION, SOLUTION INTRAVENOUS ONCE
Status: COMPLETED | OUTPATIENT
Start: 2023-08-05 | End: 2023-08-05

## 2023-08-05 RX ORDER — SODIUM CHLORIDE AND POTASSIUM CHLORIDE 150; 900 MG/100ML; MG/100ML
INJECTION, SOLUTION INTRAVENOUS CONTINUOUS
Status: DISCONTINUED | OUTPATIENT
Start: 2023-08-05 | End: 2023-08-06 | Stop reason: HOSPADM

## 2023-08-05 RX ORDER — DICYCLOMINE HYDROCHLORIDE 10 MG/1
10 CAPSULE ORAL 3 TIMES DAILY PRN
Status: DISCONTINUED | OUTPATIENT
Start: 2023-08-05 | End: 2023-08-06 | Stop reason: HOSPADM

## 2023-08-05 RX ORDER — POTASSIUM CHLORIDE 20 MEQ/1
40 TABLET, EXTENDED RELEASE ORAL ONCE
Status: COMPLETED | OUTPATIENT
Start: 2023-08-05 | End: 2023-08-05

## 2023-08-05 RX ORDER — POTASSIUM CHLORIDE 7.45 MG/ML
10 INJECTION INTRAVENOUS
Status: DISCONTINUED | OUTPATIENT
Start: 2023-08-05 | End: 2023-08-05

## 2023-08-05 RX ADMIN — PROCHLORPERAZINE EDISYLATE 5 MG: 5 INJECTION INTRAMUSCULAR; INTRAVENOUS at 10:08

## 2023-08-05 RX ADMIN — ONDANSETRON 4 MG: 2 INJECTION INTRAMUSCULAR; INTRAVENOUS at 03:08

## 2023-08-05 RX ADMIN — MAGNESIUM SULFATE IN WATER 2 G: 40 INJECTION, SOLUTION INTRAVENOUS at 10:08

## 2023-08-05 RX ADMIN — SODIUM CHLORIDE: 0.9 INJECTION, SOLUTION INTRAVENOUS at 04:08

## 2023-08-05 RX ADMIN — SODIUM CHLORIDE AND POTASSIUM CHLORIDE: .9; .15 SOLUTION INTRAVENOUS at 11:08

## 2023-08-05 RX ADMIN — METRONIDAZOLE 500 MG: 500 INJECTION, SOLUTION INTRAVENOUS at 08:08

## 2023-08-05 RX ADMIN — CIPROFLOXACIN 400 MG: 2 INJECTION, SOLUTION INTRAVENOUS at 10:08

## 2023-08-05 RX ADMIN — CIPROFLOXACIN 400 MG: 2 INJECTION, SOLUTION INTRAVENOUS at 06:08

## 2023-08-05 RX ADMIN — METRONIDAZOLE 500 MG: 500 INJECTION, SOLUTION INTRAVENOUS at 05:08

## 2023-08-05 RX ADMIN — PROCHLORPERAZINE EDISYLATE 5 MG: 5 INJECTION INTRAMUSCULAR; INTRAVENOUS at 06:08

## 2023-08-05 RX ADMIN — POTASSIUM CHLORIDE 60 MEQ: 1500 TABLET, EXTENDED RELEASE ORAL at 10:08

## 2023-08-05 RX ADMIN — PROCHLORPERAZINE EDISYLATE 5 MG: 5 INJECTION INTRAMUSCULAR; INTRAVENOUS at 12:08

## 2023-08-05 RX ADMIN — ACETAMINOPHEN 650 MG: 325 TABLET ORAL at 11:08

## 2023-08-05 RX ADMIN — METRONIDAZOLE 500 MG: 500 INJECTION, SOLUTION INTRAVENOUS at 01:08

## 2023-08-05 RX ADMIN — POTASSIUM CHLORIDE 40 MEQ: 1500 TABLET, EXTENDED RELEASE ORAL at 03:08

## 2023-08-05 NOTE — ASSESSMENT & PLAN NOTE
Likely contributing to nausea, vomting and abd discomfort  Pt reports frequent showers yesterday as this relieves her discomfort  Educated patient on importance of cessation of THC as this may be contributing to her abdominal symptoms

## 2023-08-05 NOTE — ASSESSMENT & PLAN NOTE
Secondary to colitis. S/p 2 L IV bolus.  Cont Cipro and Flagyl  -cont IVFs  cc/hr - adding Kcl today for severe hypokalemia  -PRN antiemetics  -CLQ diet, ok to advance as tolerated

## 2023-08-05 NOTE — PROGRESS NOTES
[facility] Medicine  Progress Note    Patient Name: Eva Escalera  MRN: [id number]  Patient Class: IP- Inpatient   Admission Date: 8/3/2023  Length of Stay: 0 days  Attending Physician: Eric Sanders MD  [facility]        Subjective:     Principal Problem:Sepsis        HPI:  Eva Escalera is a 37 year old female with a past medical history of CRISTINE/MDD, IBS, and THC use, who presents with one day of lower abdominal pain associated with NBNB emesis and diarrhea as well as subjective fevers and chills. She states that she has not been around recently ill friends or family members. She has had difficulty tolerating PO. She received 2 L bolus IV fluids in the ED with Levaquin and Flagyl as well as PRN antiemetics in the ED. Hospital Medicine was consulted for admission.       Overview/Hospital Course:  No notes on file    Interval History: Notes reviewed, no acute events overnight. Pt reports she feels a little better today.  She vomited once this morning.  She denies diarrhea.  She states her abdominal pain and nausea are improving.  Patient with severe hypokalemia on labs today.  Will replace and repeat BNP at 1500 and continue replacement further.  Advised patient to continue clear liquids for now and if she feels up to advancing diet later I am okay with that.  Will continue to monitor closely.      Review of Systems   Constitutional:  Positive for activity change, appetite change and fatigue. Negative for chills and fever.   HENT:  Negative for congestion, sore throat and trouble swallowing.    Respiratory:  Negative for cough and shortness of breath.    Cardiovascular:  Negative for chest pain.   Gastrointestinal:  Positive for abdominal pain, nausea and vomiting. Negative for diarrhea.   Genitourinary:  Negative for difficulty urinating, dysuria and urgency.   Musculoskeletal:  Negative for arthralgias, back pain and  gait problem.   Skin:  Negative for color change, pallor, rash and wound.   Neurological:  Negative for dizziness, weakness and light-headedness.   Psychiatric/Behavioral:  Negative for agitation, behavioral problems and confusion.    All other systems reviewed and are negative.    Objective:     Vital Signs (Most Recent):  Temp: 99 °F (37.2 °C) (08/05/23 0718)  Pulse: 81 (08/05/23 0718)  Resp: 18 (08/05/23 0718)  BP: (!) 156/96 (08/05/23 0718)  SpO2: 99 % (08/05/23 0718) Vital Signs (24h Range):  Temp:  [98.5 °F (36.9 °C)-99.5 °F (37.5 °C)] 99 °F (37.2 °C)  Pulse:  [74-83] 81  Resp:  [17-18] 18  SpO2:  [96 %-100 %] 99 %  BP: (137-156)/() 156/96     Weight: 78 kg (171 lb 15.3 oz)  Body mass index is 28.18 kg/m².    Intake/Output Summary (Last 24 hours) at 8/5/2023 1145  Last data filed at 8/5/2023 0421  Gross per 24 hour   Intake 1955 ml   Output 300 ml   Net 1655 ml           Physical Exam  Vitals and nursing note reviewed.   Constitutional:       General: She is not in acute distress.     Appearance: Normal appearance. She is well-developed. She is not ill-appearing or diaphoretic.   HENT:      Head: Normocephalic and atraumatic.      Right Ear: External ear normal.      Left Ear: External ear normal.      Nose: Nose normal. No congestion or rhinorrhea.      Mouth/Throat:      Mouth: Mucous membranes are dry.      Pharynx: Oropharynx is clear. No oropharyngeal exudate or posterior oropharyngeal erythema.   Eyes:      General: No scleral icterus.     Conjunctiva/sclera: Conjunctivae normal.      Pupils: Pupils are equal, round, and reactive to light.   Neck:      Vascular: No JVD.   Cardiovascular:      Rate and Rhythm: Normal rate and regular rhythm.      Pulses: Normal pulses.      Heart sounds: Normal heart sounds. No murmur heard.  Pulmonary:      Effort: Pulmonary effort is normal. No respiratory distress.      Breath sounds: Normal breath sounds. No stridor. No wheezing, rhonchi or rales.   Abdominal:       General: Bowel sounds are normal. There is no distension.      Palpations: Abdomen is soft.      Tenderness: There is abdominal tenderness.      Comments: Generalized lower abd discomfort   Musculoskeletal:         General: No swelling or tenderness. Normal range of motion.      Cervical back: Normal range of motion and neck supple.   Skin:     General: Skin is warm and dry.      Capillary Refill: Capillary refill takes 2 to 3 seconds.      Coloration: Skin is not jaundiced or pale.      Findings: No erythema.   Neurological:      General: No focal deficit present.      Mental Status: She is alert and oriented to person, place, and time.      Cranial Nerves: No cranial nerve deficit.      Sensory: No sensory deficit.   Psychiatric:         Mood and Affect: Mood normal.         Behavior: Behavior normal.         Thought Content: Thought content normal.             Significant Labs: All pertinent labs within the past 24 hours have been reviewed.  CBC:   Recent Labs   Lab 08/04/23  0500 08/05/23  0513   WBC 11.39 9.83   HGB 13.9 12.5   HCT 39.9 36.5*    246       CMP:   Recent Labs   Lab 08/04/23  0500 08/05/23  0513   * 135*   K 3.0* 2.4*    116*   CO2 22* 20*   * 91   BUN 7 5*   CREATININE 0.8 0.6   CALCIUM 8.9 7.0*   PROT 7.8 6.0   ALBUMIN 4.0 3.1*   BILITOT 0.7 0.8   ALKPHOS 56 42*   AST 20 18   ALT 20 17   ANIONGAP 7* -1*         Significant Imaging: I have reviewed all pertinent imaging results/findings within the past 24 hours.      Assessment/Plan:      * Sepsis  Secondary to colitis. S/p 2 L IV bolus.  Cont Cipro and Flagyl  -cont IVFs  cc/hr - adding Kcl today for severe hypokalemia  -PRN antiemetics  -CLQ diet, ok to advance as tolerated    Tetrahydrocannabinol (THC) dependence  Likely contributing to nausea, vomting and abd discomfort  Pt reports frequent showers yesterday as this relieves her discomfort  Educated patient on importance of cessation of THC as this may be  contributing to her abdominal symptoms      Tobacco use  Patient states she is no longer smoking.      Irritable bowel syndrome  Chronic  presented with imaging confirming colitis  Hold home Linzess      Intractable nausea and vomiting  Secondary to THC use and colitis  Symptoms slowly improving   Continue p.r.n. antiemetics   Patient counseled extensively on cessation of THC use and cause of abdominal symptoms  IVFs    Colitis  Symptoms slightly improved today  CT reviewed  Cont Cipro and Flagyl  NS with 20KCL @ 125 cc/hr  Cont PRN antiemetics and pain control  CLQ diet - advance as tolerated      VTE Risk Mitigation (From admission, onward)         Ordered     IP VTE LOW RISK PATIENT  Once         08/03/23 0828     Place sequential compression device  Until discontinued         08/03/23 0828                Discharge Planning   CHEKO: 8/5/2023     Code Status: Full Code   Is the patient medically ready for discharge?:     Reason for patient still in hospital (select all that apply): Patient trending condition and Treatment  Discharge Plan A: Home                  Kenzie Watson NP  Department of Hospital Medicine   [facility]

## 2023-08-05 NOTE — ASSESSMENT & PLAN NOTE
Symptoms slightly improved today  CT reviewed  Cont Cipro and Flagyl  NS with 20KCL @ 125 cc/hr  Cont PRN antiemetics and pain control  CLQ diet - advance as tolerated

## 2023-08-05 NOTE — SUBJECTIVE & OBJECTIVE
Interval History: Notes reviewed, no acute events overnight. Pt reports she feels a little better today.  She vomited once this morning.  She denies diarrhea.  She states her abdominal pain and nausea are improving.  Patient with severe hypokalemia on labs today.  Will replace and repeat BNP at 1500 and continue replacement further.  Advised patient to continue clear liquids for now and if she feels up to advancing diet later I am okay with that.  Will continue to monitor closely.      Review of Systems   Constitutional:  Positive for activity change, appetite change and fatigue. Negative for chills and fever.   HENT:  Negative for congestion, sore throat and trouble swallowing.    Respiratory:  Negative for cough and shortness of breath.    Cardiovascular:  Negative for chest pain.   Gastrointestinal:  Positive for abdominal pain, nausea and vomiting. Negative for diarrhea.   Genitourinary:  Negative for difficulty urinating, dysuria and urgency.   Musculoskeletal:  Negative for arthralgias, back pain and gait problem.   Skin:  Negative for color change, pallor, rash and wound.   Neurological:  Negative for dizziness, weakness and light-headedness.   Psychiatric/Behavioral:  Negative for agitation, behavioral problems and confusion.    All other systems reviewed and are negative.    Objective:     Vital Signs (Most Recent):  Temp: 99 °F (37.2 °C) (08/05/23 0718)  Pulse: 81 (08/05/23 0718)  Resp: 18 (08/05/23 0718)  BP: (!) 156/96 (08/05/23 0718)  SpO2: 99 % (08/05/23 0718) Vital Signs (24h Range):  Temp:  [98.5 °F (36.9 °C)-99.5 °F (37.5 °C)] 99 °F (37.2 °C)  Pulse:  [74-83] 81  Resp:  [17-18] 18  SpO2:  [96 %-100 %] 99 %  BP: (137-156)/() 156/96     Weight: 78 kg (171 lb 15.3 oz)  Body mass index is 28.18 kg/m².    Intake/Output Summary (Last 24 hours) at 8/5/2023 1145  Last data filed at 8/5/2023 0421  Gross per 24 hour   Intake 1955 ml   Output 300 ml   Net 1655 ml           Physical Exam  Vitals and  nursing note reviewed.   Constitutional:       General: She is not in acute distress.     Appearance: Normal appearance. She is well-developed. She is not ill-appearing or diaphoretic.   HENT:      Head: Normocephalic and atraumatic.      Right Ear: External ear normal.      Left Ear: External ear normal.      Nose: Nose normal. No congestion or rhinorrhea.      Mouth/Throat:      Mouth: Mucous membranes are dry.      Pharynx: Oropharynx is clear. No oropharyngeal exudate or posterior oropharyngeal erythema.   Eyes:      General: No scleral icterus.     Conjunctiva/sclera: Conjunctivae normal.      Pupils: Pupils are equal, round, and reactive to light.   Neck:      Vascular: No JVD.   Cardiovascular:      Rate and Rhythm: Normal rate and regular rhythm.      Pulses: Normal pulses.      Heart sounds: Normal heart sounds. No murmur heard.  Pulmonary:      Effort: Pulmonary effort is normal. No respiratory distress.      Breath sounds: Normal breath sounds. No stridor. No wheezing, rhonchi or rales.   Abdominal:      General: Bowel sounds are normal. There is no distension.      Palpations: Abdomen is soft.      Tenderness: There is abdominal tenderness.      Comments: Generalized lower abd discomfort   Musculoskeletal:         General: No swelling or tenderness. Normal range of motion.      Cervical back: Normal range of motion and neck supple.   Skin:     General: Skin is warm and dry.      Capillary Refill: Capillary refill takes 2 to 3 seconds.      Coloration: Skin is not jaundiced or pale.      Findings: No erythema.   Neurological:      General: No focal deficit present.      Mental Status: She is alert and oriented to person, place, and time.      Cranial Nerves: No cranial nerve deficit.      Sensory: No sensory deficit.   Psychiatric:         Mood and Affect: Mood normal.         Behavior: Behavior normal.         Thought Content: Thought content normal.             Significant Labs: All pertinent labs  within the past 24 hours have been reviewed.  CBC:   Recent Labs   Lab 08/04/23  0500 08/05/23 0513   WBC 11.39 9.83   HGB 13.9 12.5   HCT 39.9 36.5*    246       CMP:   Recent Labs   Lab 08/04/23  0500 08/05/23 0513   * 135*   K 3.0* 2.4*    116*   CO2 22* 20*   * 91   BUN 7 5*   CREATININE 0.8 0.6   CALCIUM 8.9 7.0*   PROT 7.8 6.0   ALBUMIN 4.0 3.1*   BILITOT 0.7 0.8   ALKPHOS 56 42*   AST 20 18   ALT 20 17   ANIONGAP 7* -1*         Significant Imaging: I have reviewed all pertinent imaging results/findings within the past 24 hours.

## 2023-08-05 NOTE — ASSESSMENT & PLAN NOTE
Secondary to THC use and colitis  Symptoms slowly improving   Continue p.r.n. antiemetics   Patient counseled extensively on cessation of THC use and cause of abdominal symptoms  IVFs

## 2023-08-05 NOTE — NURSING
Patient continued this shift w/ multiple episodes of vomiting and reports to me she found comfort with taking multiple baths but only had short term relief when showering. Reports 5 or more episodes of vomiting, yellow/green mucus in appearance, continues w/ some mild abd distention and tenderness. Recent bm prior to coming to ED. Patient w/ intermittent need for anti emetic but doesn't call for it, just request it when rounding.

## 2023-08-06 VITALS
DIASTOLIC BLOOD PRESSURE: 104 MMHG | WEIGHT: 171.94 LBS | SYSTOLIC BLOOD PRESSURE: 151 MMHG | HEIGHT: 66 IN | TEMPERATURE: 99 F | RESPIRATION RATE: 18 BRPM | HEART RATE: 84 BPM | OXYGEN SATURATION: 100 % | BODY MASS INDEX: 27.63 KG/M2

## 2023-08-06 LAB
ALBUMIN SERPL BCP-MCNC: 4.1 G/DL (ref 3.5–5.2)
ALP SERPL-CCNC: 56 U/L (ref 55–135)
ALT SERPL W/O P-5'-P-CCNC: 24 U/L (ref 10–44)
ANION GAP SERPL CALC-SCNC: 4 MMOL/L (ref 8–16)
ANION GAP SERPL CALC-SCNC: 8 MMOL/L (ref 8–16)
AST SERPL-CCNC: 24 U/L (ref 10–40)
BASOPHILS # BLD AUTO: 0.03 K/UL (ref 0–0.2)
BASOPHILS NFR BLD: 0.3 % (ref 0–1.9)
BILIRUB SERPL-MCNC: 1 MG/DL (ref 0.1–1)
BUN SERPL-MCNC: 5 MG/DL (ref 6–20)
BUN SERPL-MCNC: 5 MG/DL (ref 6–20)
CALCIUM SERPL-MCNC: 8.9 MG/DL (ref 8.7–10.5)
CALCIUM SERPL-MCNC: 9.3 MG/DL (ref 8.7–10.5)
CHLORIDE SERPL-SCNC: 103 MMOL/L (ref 95–110)
CHLORIDE SERPL-SCNC: 103 MMOL/L (ref 95–110)
CO2 SERPL-SCNC: 22 MMOL/L (ref 23–29)
CO2 SERPL-SCNC: 24 MMOL/L (ref 23–29)
CREAT SERPL-MCNC: 0.8 MG/DL (ref 0.5–1.4)
CREAT SERPL-MCNC: 0.8 MG/DL (ref 0.5–1.4)
DIFFERENTIAL METHOD: ABNORMAL
EOSINOPHIL # BLD AUTO: 0 K/UL (ref 0–0.5)
EOSINOPHIL NFR BLD: 0.2 % (ref 0–8)
ERYTHROCYTE [DISTWIDTH] IN BLOOD BY AUTOMATED COUNT: 12.7 % (ref 11.5–14.5)
EST. GFR  (NO RACE VARIABLE): >60 ML/MIN/1.73 M^2
EST. GFR  (NO RACE VARIABLE): >60 ML/MIN/1.73 M^2
GLUCOSE SERPL-MCNC: 100 MG/DL (ref 70–110)
GLUCOSE SERPL-MCNC: 108 MG/DL (ref 70–110)
HCT VFR BLD AUTO: 42.1 % (ref 37–48.5)
HGB BLD-MCNC: 15 G/DL (ref 12–16)
IMM GRANULOCYTES # BLD AUTO: 0.05 K/UL (ref 0–0.04)
IMM GRANULOCYTES NFR BLD AUTO: 0.5 % (ref 0–0.5)
LYMPHOCYTES # BLD AUTO: 2.2 K/UL (ref 1–4.8)
LYMPHOCYTES NFR BLD: 21.6 % (ref 18–48)
MAGNESIUM SERPL-MCNC: 2.2 MG/DL (ref 1.6–2.6)
MCH RBC QN AUTO: 30.3 PG (ref 27–31)
MCHC RBC AUTO-ENTMCNC: 35.6 G/DL (ref 32–36)
MCV RBC AUTO: 85 FL (ref 82–98)
MONOCYTES # BLD AUTO: 1.2 K/UL (ref 0.3–1)
MONOCYTES NFR BLD: 11.3 % (ref 4–15)
NEUTROPHILS # BLD AUTO: 6.7 K/UL (ref 1.8–7.7)
NEUTROPHILS NFR BLD: 66.1 % (ref 38–73)
NRBC BLD-RTO: 0 /100 WBC
PHOSPHATE SERPL-MCNC: 3.2 MG/DL (ref 2.7–4.5)
PLATELET # BLD AUTO: 284 K/UL (ref 150–450)
PMV BLD AUTO: 9.8 FL (ref 9.2–12.9)
POTASSIUM SERPL-SCNC: 3.6 MMOL/L (ref 3.5–5.1)
POTASSIUM SERPL-SCNC: 3.6 MMOL/L (ref 3.5–5.1)
PROT SERPL-MCNC: 8.2 G/DL (ref 6–8.4)
RBC # BLD AUTO: 4.95 M/UL (ref 4–5.4)
SODIUM SERPL-SCNC: 131 MMOL/L (ref 136–145)
SODIUM SERPL-SCNC: 133 MMOL/L (ref 136–145)
WBC # BLD AUTO: 10.16 K/UL (ref 3.9–12.7)

## 2023-08-06 PROCEDURE — 84100 ASSAY OF PHOSPHORUS: CPT | Performed by: STUDENT IN AN ORGANIZED HEALTH CARE EDUCATION/TRAINING PROGRAM

## 2023-08-06 PROCEDURE — 83735 ASSAY OF MAGNESIUM: CPT | Performed by: STUDENT IN AN ORGANIZED HEALTH CARE EDUCATION/TRAINING PROGRAM

## 2023-08-06 PROCEDURE — 85025 COMPLETE CBC W/AUTO DIFF WBC: CPT | Performed by: STUDENT IN AN ORGANIZED HEALTH CARE EDUCATION/TRAINING PROGRAM

## 2023-08-06 PROCEDURE — 80048 BASIC METABOLIC PNL TOTAL CA: CPT | Performed by: NURSE PRACTITIONER

## 2023-08-06 PROCEDURE — 25000003 PHARM REV CODE 250: Performed by: NURSE PRACTITIONER

## 2023-08-06 PROCEDURE — 36415 COLL VENOUS BLD VENIPUNCTURE: CPT | Performed by: NURSE PRACTITIONER

## 2023-08-06 PROCEDURE — 63600175 PHARM REV CODE 636 W HCPCS: Performed by: STUDENT IN AN ORGANIZED HEALTH CARE EDUCATION/TRAINING PROGRAM

## 2023-08-06 PROCEDURE — 36415 COLL VENOUS BLD VENIPUNCTURE: CPT | Performed by: STUDENT IN AN ORGANIZED HEALTH CARE EDUCATION/TRAINING PROGRAM

## 2023-08-06 PROCEDURE — 80053 COMPREHEN METABOLIC PANEL: CPT | Performed by: STUDENT IN AN ORGANIZED HEALTH CARE EDUCATION/TRAINING PROGRAM

## 2023-08-06 RX ORDER — CIPROFLOXACIN 500 MG/1
500 TABLET ORAL EVERY 12 HOURS
Qty: 20 TABLET | Refills: 0 | Status: SHIPPED | OUTPATIENT
Start: 2023-08-06 | End: 2023-08-16

## 2023-08-06 RX ORDER — ONDANSETRON 4 MG/1
4 TABLET, ORALLY DISINTEGRATING ORAL EVERY 6 HOURS PRN
Qty: 30 TABLET | Refills: 0 | Status: SHIPPED | OUTPATIENT
Start: 2023-08-06 | End: 2024-01-05

## 2023-08-06 RX ORDER — METRONIDAZOLE 500 MG/1
500 TABLET ORAL EVERY 8 HOURS
Qty: 30 TABLET | Refills: 0 | Status: SHIPPED | OUTPATIENT
Start: 2023-08-06 | End: 2023-08-16

## 2023-08-06 RX ORDER — AMLODIPINE BESYLATE 5 MG/1
5 TABLET ORAL DAILY
Qty: 30 TABLET | Refills: 0 | Status: SHIPPED | OUTPATIENT
Start: 2023-08-06 | End: 2024-08-05

## 2023-08-06 RX ORDER — AMLODIPINE BESYLATE 5 MG/1
5 TABLET ORAL DAILY
Status: DISCONTINUED | OUTPATIENT
Start: 2023-08-06 | End: 2023-08-06 | Stop reason: HOSPADM

## 2023-08-06 RX ORDER — PROMETHAZINE HYDROCHLORIDE 25 MG/1
25 TABLET ORAL EVERY 4 HOURS PRN
Qty: 30 TABLET | Refills: 0 | Status: SHIPPED | OUTPATIENT
Start: 2023-08-06

## 2023-08-06 RX ORDER — POTASSIUM CHLORIDE 20 MEQ/1
20 TABLET, EXTENDED RELEASE ORAL ONCE
Status: COMPLETED | OUTPATIENT
Start: 2023-08-06 | End: 2023-08-06

## 2023-08-06 RX ADMIN — METRONIDAZOLE 500 MG: 500 INJECTION, SOLUTION INTRAVENOUS at 08:08

## 2023-08-06 RX ADMIN — POTASSIUM CHLORIDE 20 MEQ: 1500 TABLET, EXTENDED RELEASE ORAL at 08:08

## 2023-08-06 RX ADMIN — CIPROFLOXACIN 400 MG: 2 INJECTION, SOLUTION INTRAVENOUS at 06:08

## 2023-08-06 RX ADMIN — PROCHLORPERAZINE EDISYLATE 5 MG: 5 INJECTION INTRAMUSCULAR; INTRAVENOUS at 08:08

## 2023-08-06 RX ADMIN — METRONIDAZOLE 500 MG: 500 INJECTION, SOLUTION INTRAVENOUS at 12:08

## 2023-08-06 NOTE — HOSPITAL COURSE
Pt was monitored closely during her hospital stay. She was initiated on IVFs, IV antiemetics and IV cipro and flagyl. She cont with intractable n/v and required titration of antiemetics. Her labs were monitored closely and electrolytes were replaced and remained stable. Her symptoms improved and diet was advanced. Pt sarah diet well and desired to go home. She was cleared for discharge to complete outpt cipro and flagyl. Pt was instructed on close outpt follow up with GI and PCP. Pt's UDS was positive for THC and she was counseled extensively on importance of cessation to avoid reoccurance of n/v. Pt verbalized understanding of discharge instructions and return precautions.     PE:  AAO x 3, pleasant, NAD  Heart - RRR, no edema  Lungs - CTA bilat, resp even unlabored  Abd - soft, nontender, normoactive BS

## 2023-08-06 NOTE — PLAN OF CARE
Problem: Adult Inpatient Plan of Care  Goal: Plan of Care Review  Outcome: Met  Goal: Patient-Specific Goal (Individualized)  Outcome: Met  Goal: Absence of Hospital-Acquired Illness or Injury  Outcome: Met  Goal: Optimal Comfort and Wellbeing  Outcome: Met  Goal: Readiness for Transition of Care  Outcome: Met     Problem: Adjustment to Illness (Sepsis/Septic Shock)  Goal: Optimal Coping  Outcome: Met     Problem: Bleeding (Sepsis/Septic Shock)  Goal: Absence of Bleeding  Outcome: Met     Problem: Glycemic Control Impaired (Sepsis/Septic Shock)  Goal: Blood Glucose Level Within Desired Range  Outcome: Met     Problem: Infection Progression (Sepsis/Septic Shock)  Goal: Absence of Infection Signs and Symptoms  Outcome: Met     Problem: Nutrition Impaired (Sepsis/Septic Shock)  Goal: Optimal Nutrition Intake  Outcome: Met     Problem: Nausea and Vomiting  Goal: Fluid and Electrolyte Balance  Outcome: Met     Problem: Coping Ineffective  Goal: Effective Coping  Outcome: Met

## 2023-08-06 NOTE — DISCHARGE SUMMARY
[facility] Medicine  Discharge Summary      Patient Name: Eva Escalera  MRN: [id number]  BRADEN: [id number]  Patient Class: IP- Inpatient  Admission Date: 8/3/2023  Hospital Length of Stay: 1 days  Discharge Date and Time: 8/6/2023 10:52 AM  Attending Physician: Loan att. providers found   Discharging Provider: Kenzie Watson NP  Primary Care Provider: [facility]    Primary Care Team: Networked reference to record PCT     HPI:   Eva Escalera is a 37 year old female with a past medical history of CRISTINE/MDD, IBS, and THC use, who presents with one day of lower abdominal pain associated with NBNB emesis and diarrhea as well as subjective fevers and chills. She states that she has not been around recently ill friends or family members. She has had difficulty tolerating PO. She received 2 L bolus IV fluids in the ED with Levaquin and Flagyl as well as PRN antiemetics in the ED. Hospital Medicine was consulted for admission.       * No surgery found *      Hospital Course:   Pt was monitored closely during her hospital stay. She was initiated on IVFs, IV antiemetics and IV cipro and flagyl. She cont with intractable n/v and required titration of antiemetics. Her labs were monitored closely and electrolytes were replaced and remained stable. Her symptoms improved and diet was advanced. Pt sarah diet well and desired to go home. She was cleared for discharge to complete outpt cipro and flagyl. Pt was instructed on close outpt follow up with GI and PCP. Pt's UDS was positive for THC and she was counseled extensively on importance of cessation to avoid reoccurance of n/v. Pt verbalized understanding of discharge instructions and return precautions.     PE:  AAO x 3, pleasant, NAD  Heart - RRR, no edema  Lungs - CTA bilat, resp even unlabored  Abd - soft, nontender, normoactive BS       Goals of Care Treatment Preferences:  Code Status: Full  Code      Consults:     No new Assessment & Plan notes have been filed under this hospital service since the last note was generated.  Service: Hospital Medicine    Final Active Diagnoses:    Diagnosis Date Noted POA    PRINCIPAL PROBLEM:  Sepsis [A41.9] 08/03/2023 Yes    Tobacco use [Z72.0] 08/03/2023 Yes    Tetrahydrocannabinol (THC) dependence [F12.20] 08/03/2023 Yes    Intractable nausea and vomiting [R11.2] 08/16/2020 Yes    Irritable bowel syndrome [K58.9] 11/12/2018 Yes    Colitis [K52.9] 04/17/2017 Yes      Problems Resolved During this Admission:    Diagnosis Date Noted Date Resolved POA    Abdominal pain due to acute colitis [R10.9] 04/18/2017 08/03/2023 Yes       Discharged Condition: good    Disposition: Home or Self Care    Follow Up:   Follow-up Information     [facility] Follow up in 3 day(s).    Why: hospital follow up, to recheck your symptoms  Contact information:  [address]  [facility] [address]  [phone]             Camacho Dove MD Follow up in 3 day(s).    Specialty: Gastroenterology  Why: hospital follow up, to recheck your symptoms  Contact information:  [address] [facility] [address] [phone]                       Patient Instructions:      Diet Cardiac     Notify your health care provider if you experience any of the following:  temperature >100.4     Notify your health care provider if you experience any of the following:  persistent nausea and vomiting or diarrhea     Notify your health care provider if you experience any of the following:  severe uncontrolled pain     Notify your health care provider if you experience any of the following:  difficulty breathing or increased cough     Notify your health care provider if you experience any of the following:  persistent dizziness, light-headedness, or visual disturbances     Notify your health care provider if you experience any of the following:  increased  confusion or weakness     Activity as tolerated       Significant Diagnostic Studies: Labs:   CMP   Recent Labs   Lab 08/05/23  0513 08/05/23  1149 08/05/23  1830 08/06/23  0028 08/06/23 0527   *   < > 129* 131* 133*   K 2.4*   < > 3.8 3.6 3.6   *   < > 105 103 103   CO2 20*   < > 21* 24 22*   GLU 91   < > 136* 108 100   BUN 5*   < > <5* 5* 5*   CREATININE 0.6   < > 0.8 0.8 0.8   CALCIUM 7.0*   < > 8.1* 8.9 9.3   PROT 6.0  --   --   --  8.2   ALBUMIN 3.1*  --   --   --  4.1   BILITOT 0.8  --   --   --  1.0   ALKPHOS 42*  --   --   --  56   AST 18  --   --   --  24   ALT 17  --   --   --  24   ANIONGAP -1*   < > 3* 4* 8    < > = values in this interval not displayed.   , CBC   Recent Labs   Lab 08/05/23 0513 08/06/23 0527   WBC 9.83 10.16   HGB 12.5 15.0   HCT 36.5* 42.1    284    and All labs within the past 24 hours have been reviewed    Pending Diagnostic Studies:     None         Medications:  Reconciled Home Medications:      Medication List      START taking these medications    amLODIPine 5 MG tablet  Commonly known as: NORVASC  Take 1 tablet (5 mg total) by mouth once daily.     ciprofloxacin HCl 500 MG tablet  Commonly known as: CIPRO  Take 1 tablet (500 mg total) by mouth every 12 (twelve) hours. for 10 days     metroNIDAZOLE 500 MG tablet  Commonly known as: FLAGYL  Take 1 tablet (500 mg total) by mouth every 8 (eight) hours. for 10 days     ondansetron 4 MG Tbdl  Commonly known as: ZOFRAN-ODT  Take 1 tablet (4 mg total) by mouth every 6 (six) hours as needed (nausea or vomiting).     promethazine 25 MG tablet  Commonly known as: PHENERGAN  Take 1 tablet (25 mg total) by mouth every 4 (four) hours as needed for Nausea (or vomiting unrelieved by zofran).        CONTINUE taking these medications    cholecalciferol (vitamin D3) 1,250 mcg (50,000 unit) capsule  Take 50,000 Units by mouth every Wednesday.     dicyclomine 10 MG capsule  Commonly known as: BENTYL  Take 10 mg by mouth 3  (three) times daily.     gabapentin 300 MG capsule  Commonly known as: NEURONTIN  Take 300 mg by mouth 3 (three) times daily.     ketoconazole 2 % shampoo  Commonly known as: NIZORAL  USE TO WASH GROIN AREA EVERY OTHER DAY FOR 15 DAYS     medroxyPROGESTERone 150 mg/mL Syrg  Commonly known as: DEPO-PROVERA  Inject 150 mg into the muscle every 3 (three) months.     oxyCODONE-acetaminophen 5-325 mg per tablet  Commonly known as: PERCOCET  Take 1 tablet by mouth every 4 (four) hours as needed for Pain.     pulse oximeter device  Commonly known as: pulse oximeter  by Apply Externally route 2 (two) times a day. Use twice daily at 8 AM and 3 PM and record the value in Ocsct as directed.     valACYclovir 500 MG tablet  Commonly known as: VALTREX  Take 500 mg by mouth 2 (two) times daily as needed.        STOP taking these medications    LINZESS 145 mcg Cap capsule  Generic drug: linaCLOtide            Indwelling Lines/Drains at time of discharge:   Lines/Drains/Airways     None                 Time spent on the discharge of patient: 49 minutes         Kenzie Watson NP  Department of Hospital Medicine  [facility]

## 2023-08-06 NOTE — NURSING
"Discussed telemetry monitor with patient and educated patient on the importance of wearing it as ordered by the MD.Patient states" I am not trying to be difficult, but it is in my way and I take frequent showers for my nausea". Patient refused but will attempt to have patient wear  every 4 hours , so we can get a strip  "

## 2023-08-06 NOTE — NURSING
Spoke to Dr. Juan Manuel Olson about stopping K+ 20 meq per patient request. Dr. Juan Manuel Olson agreed to hold K+ until next lab draw to see how well the K+ is holding up. Will continue to monitor pt

## 2023-08-06 NOTE — PLAN OF CARE
08/06/23 1028   Final Note   Assessment Type Final Discharge Note   Anticipated Discharge Disposition Home   What phone number can be called within the next 1-3 days to see how you are doing after discharge? [phone]   Post-Acute Status   Discharge Delays None known at this time       Patient cleared for discharge from case management standpoint.    Chart and discharge orders reviewed.  Patient discharged home with no further case management needs.

## 2023-08-08 ENCOUNTER — PATIENT OUTREACH (OUTPATIENT)
Dept: ADMINISTRATIVE | Facility: CLINIC | Age: 38
End: 2023-08-08
Payer: MEDICAID

## 2023-08-08 NOTE — PROGRESS NOTES
C3 nurse attempted to contact Eva Escalera  for a TCC post hospital discharge follow up call. No answer. No voicemail available.The patient does not have a scheduled HOSFU appointment. Message sent to PCP staff for assistance with scheduling visit with patient.

## 2023-08-08 NOTE — PROGRESS NOTES
C3 nurse spoke with Eva Escalera  for a TCC post hospital discharge follow up call. The patient reports does not have a scheduled HOSFU appointment. C3 nurse was unable to schedule HOSFU appointment [facility]. Patient advised to contact their PCP to schedule a HOSPFU within 5-7 days.

## 2023-08-09 ENCOUNTER — HOSPITAL ENCOUNTER (OUTPATIENT)
Dept: RADIOLOGY | Facility: HOSPITAL | Age: 38
Discharge: HOME | End: 2023-08-09
Attending: SPECIALIST
Payer: MEDICAID

## 2023-08-09 DIAGNOSIS — R92.8 MAMMOGRAM ABNORMAL: ICD-10-CM

## 2023-08-09 PROCEDURE — 77061 BREAST TOMOSYNTHESIS UNI: CPT | Mod: TC,PO,RT

## 2023-08-09 PROCEDURE — 76642 ULTRASOUND BREAST LIMITED: CPT | Mod: TC,PO,RT

## 2023-08-12 ENCOUNTER — HOSPITAL ENCOUNTER (EMERGENCY)
Facility: HOSPITAL | Age: 38
Discharge: HOME | End: 2023-08-12
Attending: EMERGENCY MEDICINE
Payer: MEDICAID

## 2023-08-12 VITALS
WEIGHT: 167.56 LBS | DIASTOLIC BLOOD PRESSURE: 92 MMHG | HEIGHT: 66 IN | BODY MASS INDEX: 26.93 KG/M2 | OXYGEN SATURATION: 100 % | SYSTOLIC BLOOD PRESSURE: 164 MMHG | TEMPERATURE: 98 F | HEART RATE: 92 BPM | RESPIRATION RATE: 17 BRPM

## 2023-08-12 DIAGNOSIS — R11.2 NAUSEA AND VOMITING, UNSPECIFIED VOMITING TYPE: Primary | ICD-10-CM

## 2023-08-12 DIAGNOSIS — K52.9 COLITIS: ICD-10-CM

## 2023-08-12 LAB
ALBUMIN SERPL BCP-MCNC: 4.3 G/DL (ref 3.5–5.2)
ALP SERPL-CCNC: 73 U/L (ref 55–135)
ALT SERPL W/O P-5'-P-CCNC: 21 U/L (ref 10–44)
ANION GAP SERPL CALC-SCNC: 15 MMOL/L (ref 8–16)
AST SERPL-CCNC: 19 U/L (ref 10–40)
B-HCG UR QL: NEGATIVE
BASOPHILS # BLD AUTO: 0.04 K/UL (ref 0–0.2)
BASOPHILS NFR BLD: 0.3 % (ref 0–1.9)
BILIRUB SERPL-MCNC: 0.3 MG/DL (ref 0.1–1)
BILIRUB UR QL STRIP: NEGATIVE
BUN SERPL-MCNC: 9 MG/DL (ref 6–20)
CALCIUM SERPL-MCNC: 9.8 MG/DL (ref 8.7–10.5)
CHLORIDE SERPL-SCNC: 107 MMOL/L (ref 95–110)
CLARITY UR: ABNORMAL
CO2 SERPL-SCNC: 17 MMOL/L (ref 23–29)
COLOR UR: YELLOW
CREAT SERPL-MCNC: 0.9 MG/DL (ref 0.5–1.4)
CTP QC/QA: YES
DIFFERENTIAL METHOD: ABNORMAL
EOSINOPHIL # BLD AUTO: 0 K/UL (ref 0–0.5)
EOSINOPHIL NFR BLD: 0.2 % (ref 0–8)
ERYTHROCYTE [DISTWIDTH] IN BLOOD BY AUTOMATED COUNT: 12.9 % (ref 11.5–14.5)
EST. GFR  (NO RACE VARIABLE): >60 ML/MIN/1.73 M^2
GLUCOSE SERPL-MCNC: 134 MG/DL (ref 70–110)
GLUCOSE UR QL STRIP: NEGATIVE
HCT VFR BLD AUTO: 41.4 % (ref 37–48.5)
HGB BLD-MCNC: 14.2 G/DL (ref 12–16)
HGB UR QL STRIP: NEGATIVE
IMM GRANULOCYTES # BLD AUTO: 0.08 K/UL (ref 0–0.04)
IMM GRANULOCYTES NFR BLD AUTO: 0.5 % (ref 0–0.5)
KETONES UR QL STRIP: NEGATIVE
LEUKOCYTE ESTERASE UR QL STRIP: NEGATIVE
LIPASE SERPL-CCNC: 25 U/L (ref 4–60)
LYMPHOCYTES # BLD AUTO: 2.8 K/UL (ref 1–4.8)
LYMPHOCYTES NFR BLD: 18.2 % (ref 18–48)
MCH RBC QN AUTO: 29.9 PG (ref 27–31)
MCHC RBC AUTO-ENTMCNC: 34.3 G/DL (ref 32–36)
MCV RBC AUTO: 87 FL (ref 82–98)
MONOCYTES # BLD AUTO: 0.8 K/UL (ref 0.3–1)
MONOCYTES NFR BLD: 5.1 % (ref 4–15)
NEUTROPHILS # BLD AUTO: 11.6 K/UL (ref 1.8–7.7)
NEUTROPHILS NFR BLD: 75.7 % (ref 38–73)
NITRITE UR QL STRIP: NEGATIVE
NRBC BLD-RTO: 0 /100 WBC
PH UR STRIP: >8 [PH] (ref 5–8)
PLATELET # BLD AUTO: 309 K/UL (ref 150–450)
PMV BLD AUTO: 9.1 FL (ref 9.2–12.9)
POTASSIUM SERPL-SCNC: 3.2 MMOL/L (ref 3.5–5.1)
PROT SERPL-MCNC: 8.2 G/DL (ref 6–8.4)
PROT UR QL STRIP: ABNORMAL
RBC # BLD AUTO: 4.75 M/UL (ref 4–5.4)
SODIUM SERPL-SCNC: 139 MMOL/L (ref 136–145)
SP GR UR STRIP: 1.02 (ref 1–1.03)
URN SPEC COLLECT METH UR: ABNORMAL
UROBILINOGEN UR STRIP-ACNC: NEGATIVE EU/DL
WBC # BLD AUTO: 15.36 K/UL (ref 3.9–12.7)

## 2023-08-12 PROCEDURE — 81025 URINE PREGNANCY TEST: CPT | Performed by: EMERGENCY MEDICINE

## 2023-08-12 PROCEDURE — 25000003 PHARM REV CODE 250: Performed by: EMERGENCY MEDICINE

## 2023-08-12 PROCEDURE — 36415 COLL VENOUS BLD VENIPUNCTURE: CPT | Performed by: EMERGENCY MEDICINE

## 2023-08-12 PROCEDURE — 63600175 PHARM REV CODE 636 W HCPCS: Performed by: EMERGENCY MEDICINE

## 2023-08-12 PROCEDURE — 99285 EMERGENCY DEPT VISIT HI MDM: CPT | Mod: 25

## 2023-08-12 PROCEDURE — 96374 THER/PROPH/DIAG INJ IV PUSH: CPT

## 2023-08-12 PROCEDURE — 80053 COMPREHEN METABOLIC PANEL: CPT | Performed by: EMERGENCY MEDICINE

## 2023-08-12 PROCEDURE — 85025 COMPLETE CBC W/AUTO DIFF WBC: CPT | Performed by: EMERGENCY MEDICINE

## 2023-08-12 PROCEDURE — 96375 TX/PRO/DX INJ NEW DRUG ADDON: CPT

## 2023-08-12 PROCEDURE — 83690 ASSAY OF LIPASE: CPT | Performed by: EMERGENCY MEDICINE

## 2023-08-12 PROCEDURE — 25500020 PHARM REV CODE 255

## 2023-08-12 PROCEDURE — 81003 URINALYSIS AUTO W/O SCOPE: CPT | Performed by: EMERGENCY MEDICINE

## 2023-08-12 RX ORDER — DROPERIDOL 2.5 MG/ML
5 INJECTION, SOLUTION INTRAMUSCULAR; INTRAVENOUS
Status: COMPLETED | OUTPATIENT
Start: 2023-08-12 | End: 2023-08-12

## 2023-08-12 RX ORDER — POTASSIUM CHLORIDE 20 MEQ/1
40 TABLET, EXTENDED RELEASE ORAL
Status: COMPLETED | OUTPATIENT
Start: 2023-08-12 | End: 2023-08-12

## 2023-08-12 RX ORDER — PROCHLORPERAZINE EDISYLATE 5 MG/ML
10 INJECTION INTRAMUSCULAR; INTRAVENOUS
Status: COMPLETED | OUTPATIENT
Start: 2023-08-12 | End: 2023-08-12

## 2023-08-12 RX ORDER — PROMETHAZINE HYDROCHLORIDE 25 MG/1
25 SUPPOSITORY RECTAL EVERY 6 HOURS PRN
Qty: 10 SUPPOSITORY | Refills: 0 | Status: SHIPPED | OUTPATIENT
Start: 2023-08-12

## 2023-08-12 RX ADMIN — POTASSIUM CHLORIDE 40 MEQ: 1500 TABLET, EXTENDED RELEASE ORAL at 06:08

## 2023-08-12 RX ADMIN — DROPERIDOL 5 MG: 2.5 INJECTION, SOLUTION INTRAMUSCULAR; INTRAVENOUS at 02:08

## 2023-08-12 RX ADMIN — PROCHLORPERAZINE EDISYLATE 10 MG: 5 INJECTION, SOLUTION INTRAMUSCULAR; INTRAVENOUS at 06:08

## 2023-08-12 RX ADMIN — IOHEXOL 100 ML: 350 INJECTION, SOLUTION INTRAVENOUS at 04:08

## 2023-08-12 NOTE — ED PROVIDER NOTES
Encounter Date: 2023       History     Chief Complaint   Patient presents with    Emesis     Patient stated she began vomiting approx 4 hours after eating supper.  Just discharged on 23 with diagnosis of colitis.       37-year-old female past medical history of anxiety, IBS, and THC use presents today with vomiting.  Patient reports nonbloody nonbilious emesis that began approximately 4 hours prior to arrival.  She states vomiting began after she ate some decrease barbecue.  Denies any fevers or chills.  Patient was minimal crampy epigastric abdominal pain.  Patient reports similar presentation back in 2017 when she was diagnosed with colitis.  Reports she is had a colonoscopy and endoscopy which were unremarkable in the past.  She was also recently seen on the  and the  for similar presentation.  CT at that time showed colitis.  Of note there was question of whether this could be related to cannabinoid hyperemesis syndrome.  Patient takes Bentyl occasionally.  She was on Linzess but stopped after most recent admission.    The history is provided by the patient. No  was used.     Review of patient's allergies indicates:  No Known Allergies  Past Medical History:   Diagnosis Date    Anxiety     Autoimmune disease     SEES RHEUMATOLOGIST, positve STACIE    Depression     General anesthetics causing adverse effect in therapeutic use     allergic to propofol    Herpes     Menometrorrhagia     PONV (postoperative nausea and vomiting)      Past Surgical History:   Procedure Laterality Date    ANAL SPHINCTEROTOMY N/A 2020    Procedure: SPHINCTEROTOMY, ANAL - Fissurectomy;  Surgeon: James Tian MD;  [email or web address] OR;  Service: General;  Laterality: N/A;    BREAST RECONSTRUCTION       SECTION      COLONOSCOPY N/A 2019    Procedure: COLONOSCOPY;  Surgeon: Camacho Dove MD;  Location: [facility];  Service: Endoscopy;  Laterality: N/A;    COSMETIC SURGERY   09/23/2020    liposuction, fat graft to buttocks    DIGITAL RECTAL EXAMINATION UNDER ANESTHESIA N/A 12/23/2020    Procedure: EXAM UNDER ANESTHESIA, DIGITAL, RECTUM;  Surgeon: James Tian MD;  Location: [facility] OR;  Service: General;  Laterality: N/A;    EXAMINATION UNDER ANESTHESIA N/A 06/02/2021    Procedure: EXAM UNDER ANESTHESIA;  Surgeon: James Tian MD;  Location: [facility] OR;  Service: General;  Laterality: N/A;    FISTULOTOMY N/A 06/02/2021    Procedure: FISTULOTOMY;  Surgeon: James Tian MD;  Location: [facility] OR;  Service: General;  Laterality: N/A;    HYSTEROSCOPY WITH DILATION AND CURETTAGE OF UTERUS N/A 12/17/2018    Procedure: HYSTEROSCOPY, WITH DILATION AND CURETTAGE OF UTERUS;  Surgeon: Gloria Umaña MD;  Location: [facility] OR;  Service: OB/GYN;  Laterality: N/A;    LASER ABLATION OF THE CERVIX      LIPOMA RESECTION      LIPOSUCTION OF ABDOMEN      MYOMECTOMY N/A 12/17/2018    Procedure: MYOMECTOMY;  Surgeon: Gloria Umaña MD;  Location: [facility] OR;  Service: OB/GYN;  Laterality: N/A;  myosure REF RM- [id number]  time= 0.45  Aqulex Ref [id number]  [id number]     Family History   Adopted: Yes     Social History     Tobacco Use    Smoking status: Some Days     Current packs/day: 0.00     Types: Cigarettes    Smokeless tobacco: Never    Tobacco comments:     Pt states that she is not currently smoking. Has been tobacco free. Seen for inpatient smoking cessation on 8/3/2023   Substance Use Topics    Alcohol use: Yes     Comment: socially    Drug use: Yes     Types: Marijuana     Review of Systems    Physical Exam     Initial Vitals [08/12/23 0135]   BP Pulse Resp Temp SpO2   132/89 92 (!) 26 97.9 °F (36.6 °C) 99 %      MAP       --         Physical Exam    Nursing note and vitals reviewed.  Constitutional: She appears well-developed. She is not diaphoretic. No distress.   HENT:   Head: Normocephalic and atraumatic.   Nose: Nose normal.   Eyes: EOM are normal. No scleral  icterus.   Neck: Neck supple.   Normal range of motion.  Cardiovascular:  Normal rate, regular rhythm, normal heart sounds and intact distal pulses.     Exam reveals no gallop and no friction rub.       No murmur heard.  Pulmonary/Chest: Breath sounds normal. No stridor. No respiratory distress. She has no wheezes. She has no rhonchi. She has no rales.   Abdominal: Abdomen is soft. Bowel sounds are normal. She exhibits no distension. There is no abdominal tenderness. There is no rebound and no guarding.   Musculoskeletal:         General: Normal range of motion.      Cervical back: Normal range of motion and neck supple.     Neurological: She is alert and oriented to person, place, and time. She has normal strength. No cranial nerve deficit or sensory deficit.   Skin: Skin is warm and dry. Capillary refill takes less than 2 seconds. No rash noted.   Psychiatric: She has a normal mood and affect.         ED Course   Procedures  Labs Reviewed   CBC W/ AUTO DIFFERENTIAL - Abnormal; Notable for the following components:       Result Value    WBC 15.36 (*)     MPV 9.1 (*)     Gran # (ANC) 11.6 (*)     Immature Grans (Abs) 0.08 (*)     Gran % 75.7 (*)     All other components within normal limits   COMPREHENSIVE METABOLIC PANEL - Abnormal; Notable for the following components:    Potassium 3.2 (*)     CO2 17 (*)     Glucose 134 (*)     All other components within normal limits   URINALYSIS, REFLEX TO URINE CULTURE - Abnormal; Notable for the following components:    Appearance, UA Hazy (*)     pH, UA >8.0 (*)     Protein, UA Trace (*)     All other components within normal limits    Narrative:     Specimen Source->Urine   LIPASE   POCT URINE PREGNANCY          Imaging Results              CT Abdomen Pelvis With Contrast (In process)                      Medications   droPERidol injection 5 mg (5 mg Intravenous Given 8/12/23 0438)   iohexoL (OMNIPAQUE 350) 350 mg iodine/mL injection (100 mLs  Given 8/12/23 7074)    prochlorperazine injection Soln 10 mg (10 mg Intravenous Given 8/12/23 0617)   potassium chloride SA CR tablet 40 mEq (40 mEq Oral Given 8/12/23 0636)     Medical Decision Making:   ED Management:  Unclear etiology of patient's symptoms.  Possibly secondary to gastroparesis or cannabinoid hyperemesis syndrome. Symptoms controlled in ED.  There does not appear to be an emergent cause of the symptoms, including, but not limited to, small bowel obstruction, coronary syndrome, bowel ischemia, DKA, or pancreatitis. CT again shows colitis but no other emergent pathology.  Patient reports she was just started on Cipro Flagyl yesterday and I feel this is appropriate regimen.  Labs do show some electrolyte abnormalities with hypokalemia which was repleted in the ER.    After treatment, the patient is feeling much better, tolerating PO fluids, and shows no signs of dehydration. Suspect likely transient course of illness but will also give symptomatic treatment.    Plan discharge home with prompt primary care physician follow up in the next 48 hours. Strict return precautions. Patient understands agrees with discharge instructions and return precautions plans to follow-up appropriately.               ED Course as of 08/12/23 0717   Sat Aug 12, 2023   0549 CT Abdomen Pelvis With Contrast  IMPRESSION:  Mild residual colonic thickening suggestive of nonspecific colitis.  Thank you for allowing us to participate in the care of your patient.  Dictated and Authenticated by: Jr. Brandi, MD Allan  08/12/2023 5:45 AM Central Time ([place] & [place]) [BD]      ED Course User Index  [BD] Cornelio Robledo MD                 Clinical Impression:   Final diagnoses:  [R11.2] Nausea and vomiting, unspecified vomiting type (Primary)  [K52.9] Colitis        ED Disposition Condition    Discharge Stable          ED Prescriptions       Medication Sig Dispense Start Date End Date Auth. Provider    promethazine (PHENERGAN) 25 MG suppository Place 1  suppository (25 mg total) rectally every 6 (six) hours as needed for Nausea. 10 suppository 8/12/2023 -- Cornelio Robledo MD          Follow-up Information       Follow up With Specialties Details Why Contact Info Additional Information    [facility]  Go in 2 days  [address]  [place] [address]  [phone]       [facility] -  Emergency Medicine Go to  As needed, If symptoms worsen [address] [place] [address] [address] [place] [address] 1st floor             Cornelio Robledo MD  08/12/23 6270

## 2023-08-12 NOTE — ED NOTES
Spoke with [facility] office.  [id number] took information to do well fair check to see if patient so is at home. Son departed from ER parking lot after dropping patient off. Patient does not have cell phone, does not know son phone number. Patient contact Grace does not have working number for son. Report to ALIX Martinez

## 2023-08-12 NOTE — ED NOTES
Unable to reach patients son-  have not heard back from [facility] regarding earlier welfare check.  Patient has no phone, no wallet.  [place] cab called for patient, advised to go directly to patients listed home address.

## 2023-08-12 NOTE — ED NOTES
Patient reporting feeling better at this time. Patient unable to urinate at this time. Patient verbalized understanding in need for urine.

## 2023-08-14 ENCOUNTER — PATIENT OUTREACH (OUTPATIENT)
Dept: EMERGENCY MEDICINE | Facility: HOSPITAL | Age: 38
End: 2023-08-14

## 2023-08-15 ENCOUNTER — PATIENT OUTREACH (OUTPATIENT)
Dept: EMERGENCY MEDICINE | Facility: HOSPITAL | Age: 38
End: 2023-08-15

## 2023-08-15 NOTE — PROGRESS NOTES
Graciela Silva  ED Navigator  Emergency Department    [place] [facility] [place]  Role: Community Health Worker    Date: 08/15/2023  Patient Name: Eva Escalera  MRN: [id number]  PCP: Samanta Messer [facility]    Assessment:     Eva Escalera is a 37 y.o. female who has presented to ED for Nausea/Vomiting. Patient has visited the ED 2 times in the past 3 months. Patient did not contact PCP.     ED Navigator Initial Assessment    ED Navigator Enrollment Documentation  Consent to Services  Does patient consent to completing the assessment?: Yes  Contact  Method of Initial Contact: Phone  Transportation  Does the patient have issues with Transportation?: No  Does the patient have transportation to and from healthcare appointments?: Yes  Insurance Coverage  Do you have coverage/adequate coverage?: Yes  Type/kind of coverage: [place]/Medicaid  Is patient able to afford co-pays/deductibles?: Yes  Is patient able to afford HME or supplies?: Yes  Does patient have an established [facility] PCP?: Yes  Able to access?: Yes  Does the patient have a lack of adequate coverage?: No  Specialist Appointment  Did the patient come to the ED to see a specialist?: No  Does the patient have a pending specialist referral?: No  Does the patient have a specialist appointment made?: Yes  Is the patient able to access a timely specialist appointment?: Yes  PCP Follow Up Appointment  Has the patient had an appointment with a primary care provider in the past year?: Yes  Approximate date: 7/15/23  Provider: Jamari Vásquez MD  Does the patient have a follow up appontment with a PCP?: Yes  Upcoming appointment date: 8/15/23  Provider: Jamari Vásquez MD  When was the last time you saw your PCP?: 7/15/23  Medications  Is patient able to afford medication?: Yes  Is patient unable to get medication due to lack of transportation?: No  Psychological  Does the patient have psycho-social concerns?: No  Food  Does the  patient have concerns about food?: Yes  What concerns does the patient have regarding food?: Lack of food  Communication/Education  Does the patient have limited English proficiency/English not primary language?: No  Does patient have low literacy and/or low health literacy?: Yes  Does patient have concerns with care?: No  Does patient have dissatisfaction with care?: No  Other Financial Concerns  Does the patient have immediate financial distress?: No  Does the patient have general financial concerns?: Yes  Other Social Barriers/Concerns  Does the patient have any additional barriers or concerns?: Unable to afford utilities, Work  Primary Barrier  Barriers identified: Cognitive barrier (health literacy, language and communication, etc.)  Root Cause of ED Utilization: Patient Knowledge/Low Health Literacy  Plan to address Patient Knowledge/Low Health Literacy: Provided information for [facility] 24/7 Nurse triage line [phone] or 1-866-[facility] ([phone])  Next steps: Provided Education  Was education/educational materials provided surrounding PCP services/creating a medical home?: Yes Was education verbal or written?: Written     Was education/educational materials provided surrounding low cost, healthy foods?: Yes Was education verbal or written?: Written     Was education/educational materials provided surrounding other items? If so, use comment to explain.: Yes Was education verbal or written?: Written   Plan: Provided information on SNAP Benefits ([place])  Expected Date of Follow Up 1: 9/12/23         Social History     Socioeconomic History    Marital status: Single   Tobacco Use    Smoking status: Some Days     Current packs/day: 0.00     Types: Cigarettes    Smokeless tobacco: Never    Tobacco comments:     Pt states that she is not currently smoking. Has been tobacco free. Seen for inpatient smoking cessation on 8/3/2023   Substance and Sexual Activity    Alcohol use: Yes     Comment:  socially    Drug use: Yes     Types: Marijuana    Sexual activity: Yes     Birth control/protection: Implant     Social Determinants of Health     Financial Resource Strain: Medium Risk (8/15/2023)    Overall Financial Resource Strain (CARDIA)     Difficulty of Paying Living Expenses: Somewhat hard   Food Insecurity: Food Insecurity Present (8/15/2023)    Hunger Vital Sign     Worried About Running Out of Food in the Last Year: Sometimes true     Ran Out of Food in the Last Year: Sometimes true   Transportation Needs: No Transportation Needs (8/15/2023)    PRAPARE - Transportation     Lack of Transportation (Medical): No     Lack of Transportation (Non-Medical): No   Stress: No Stress Concern Present (8/15/2023)    [employer] [place] [employer] - Occupational Stress Questionnaire     Feeling of Stress : Only a little   Social Connections: Unknown (8/15/2023)    Social Connection and Isolation Panel [NHANES]     Frequency of Communication with Friends and Family: Three times a week     Frequency of Social Gatherings with Friends and Family: Three times a week     Marital Status: Never    Housing Stability: Unknown (8/15/2023)    Housing Stability Vital Sign     Unable to Pay for Housing in the Last Year: No     Unstable Housing in the Last Year: No       Plan:   Spoke with patient on the telephone and completed initial assessment for ED Navigation.  Patient reported she is feeling somewhat better since her recent ED visits and thinks she may be able to eat something today after days of not keeping anything down.  Patient stated she has a follow up appointment with her PCP today (Dr. Vásquez,) and she plans to discuss with him the medication he put her on last month, which she believes may have caused her recent symptoms.  Patient stated she has an appointment with her GI doctor (Dr. Chacon,) on Thursday this week.  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  Patient stated she needs help with food, and was previously denied food stamps due to income.  I urged her to reapply.  Patient stated she has been out of work due to her illness, and she needs help with her utility bills.  Patient reported no concerns with transportation.  She stated she is moving her only son to college in [place] on her birthday, 08/20[birth year]23, and said she needs to get her strength back.  Patient reported she stopped smoking, she drinks alcohol socially, and she reported no concerns with depression/anxiety.  Patient was given education on ([facility] Right [facility] at the Right Level information, [facility] Virtual Visit information, and Heart healthy diet tips), [facility] Nurse Triage line, food stamp application, food pantries, and list of organizations that offer financial assistance with daily living expenses.    Graciela Silva  ED Navigator

## 2023-11-06 PROBLEM — A41.9 SEPSIS: Status: RESOLVED | Noted: 2023-08-03 | Resolved: 2023-11-06

## 2023-11-28 ENCOUNTER — OFFICE VISIT (OUTPATIENT)
Dept: URGENT CARE | Facility: CLINIC | Age: 38
End: 2023-11-28
Payer: MEDICAID

## 2023-11-28 VITALS
BODY MASS INDEX: 28.28 KG/M2 | WEIGHT: 176 LBS | HEIGHT: 66 IN | RESPIRATION RATE: 20 BRPM | TEMPERATURE: 98 F | HEART RATE: 89 BPM | OXYGEN SATURATION: 98 % | DIASTOLIC BLOOD PRESSURE: 70 MMHG | SYSTOLIC BLOOD PRESSURE: 105 MMHG

## 2023-11-28 DIAGNOSIS — M62.830 SPASM OF THORACIC BACK MUSCLE: Primary | ICD-10-CM

## 2023-11-28 DIAGNOSIS — M54.9 UPPER BACK PAIN: ICD-10-CM
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
## 2023-11-28 PROCEDURE — 72070 XR THORACIC SPINE AP LATERAL: ICD-10-PCS | Mod: S$GLB,,, | Performed by: RADIOLOGY

## 2023-11-28 PROCEDURE — 72070 X-RAY EXAM THORAC SPINE 2VWS: CPT | Mod: S$GLB,,, | Performed by: RADIOLOGY

## 2023-11-28 PROCEDURE — 99214 OFFICE O/P EST MOD 30 MIN: CPT | Mod: S$GLB,,, | Performed by: NURSE PRACTITIONER

## 2023-11-28 PROCEDURE — 99214 PR OFFICE/OUTPT VISIT, EST, LEVL IV, 30-39 MIN: ICD-10-PCS | Mod: S$GLB,,, | Performed by: NURSE PRACTITIONER

## 2023-11-28 RX ORDER — METHOCARBAMOL 500 MG/1
500 TABLET, FILM COATED ORAL 4 TIMES DAILY
Qty: 28 TABLET | Refills: 0 | Status: SHIPPED | OUTPATIENT
Start: 2023-11-28 | End: 2023-12-05

## 2023-11-28 RX ORDER — NAPROXEN 500 MG/1
500 TABLET ORAL 2 TIMES DAILY
Qty: 10 TABLET | Refills: 0 | Status: SHIPPED | OUTPATIENT
Start: 2023-11-28 | End: 2023-12-03

## 2023-11-28 NOTE — PATIENT INSTRUCTIONS
Rest the affected painful area.  Apply warm compresses intermittently as needed.  As pain recedes, begin normal activities slowly as tolerated.  Call if symptoms persist.     Thank you for the opportunity to care for you today.  Please take all medications as directed, and continue any previously prescribed medications unless we specifically discussed discontinuing them.  If your symptoms do not resolve or worsen please return to the clinic for re-evaluation.  If your situation becomes emergent, please present to the nearest emergency department.  Follow-up with your PCP for continued evaluation and management.
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
## 2024-01-05 ENCOUNTER — HOSPITAL ENCOUNTER (EMERGENCY)
Facility: HOSPITAL | Age: 39
Discharge: HOME | End: 2024-01-06
Attending: EMERGENCY MEDICINE
Payer: MEDICAID

## 2024-01-05 VITALS
DIASTOLIC BLOOD PRESSURE: 99 MMHG | HEART RATE: 97 BPM | TEMPERATURE: 98 F | OXYGEN SATURATION: 100 % | SYSTOLIC BLOOD PRESSURE: 164 MMHG | BODY MASS INDEX: 26.22 KG/M2 | WEIGHT: 160 LBS | RESPIRATION RATE: 20 BRPM

## 2024-01-05 DIAGNOSIS — R10.10 PAIN OF UPPER ABDOMEN: ICD-10-CM

## 2024-01-05 DIAGNOSIS — R11.2 NAUSEA AND VOMITING, UNSPECIFIED VOMITING TYPE: Primary | ICD-10-CM

## 2024-01-05 DIAGNOSIS — F12.188 CANNABIS HYPEREMESIS SYNDROME CONCURRENT WITH AND DUE TO CANNABIS ABUSE: ICD-10-CM

## 2024-01-05 LAB
ALBUMIN SERPL BCP-MCNC: 5.1 G/DL (ref 3.5–5.2)
ALP SERPL-CCNC: 67 U/L (ref 55–135)
ALT SERPL W/O P-5'-P-CCNC: 17 U/L (ref 10–44)
ANION GAP SERPL CALC-SCNC: 14 MMOL/L (ref 8–16)
AST SERPL-CCNC: 17 U/L (ref 10–40)
B-HCG UR QL: NEGATIVE
BACTERIA #/AREA URNS HPF: NEGATIVE /HPF
BASOPHILS # BLD AUTO: 0.01 K/UL (ref 0–0.2)
BASOPHILS NFR BLD: 0.1 % (ref 0–1.9)
BILIRUB SERPL-MCNC: 0.8 MG/DL (ref 0.1–1)
BILIRUB UR QL STRIP: NEGATIVE
BUN SERPL-MCNC: 10 MG/DL (ref 6–20)
CALCIUM SERPL-MCNC: 10.5 MG/DL (ref 8.7–10.5)
CHLORIDE SERPL-SCNC: 104 MMOL/L (ref 95–110)
CLARITY UR: CLEAR
CO2 SERPL-SCNC: 19 MMOL/L (ref 23–29)
COLOR UR: YELLOW
CREAT SERPL-MCNC: 0.9 MG/DL (ref 0.5–1.4)
CTP QC/QA: YES
DIFFERENTIAL METHOD BLD: ABNORMAL
EOSINOPHIL # BLD AUTO: 0 K/UL (ref 0–0.5)
EOSINOPHIL NFR BLD: 0 % (ref 0–8)
ERYTHROCYTE [DISTWIDTH] IN BLOOD BY AUTOMATED COUNT: 13 % (ref 11.5–14.5)
EST. GFR  (NO RACE VARIABLE): >60 ML/MIN/1.73 M^2
GLUCOSE SERPL-MCNC: 139 MG/DL (ref 70–110)
GLUCOSE UR QL STRIP: NEGATIVE
HCT VFR BLD AUTO: 43.8 % (ref 37–48.5)
HGB BLD-MCNC: 14.8 G/DL (ref 12–16)
HGB UR QL STRIP: ABNORMAL
HYALINE CASTS #/AREA URNS LPF: 11 /LPF
IMM GRANULOCYTES # BLD AUTO: 0.04 K/UL (ref 0–0.04)
IMM GRANULOCYTES NFR BLD AUTO: 0.4 % (ref 0–0.5)
KETONES UR QL STRIP: ABNORMAL
LEUKOCYTE ESTERASE UR QL STRIP: NEGATIVE
LIPASE SERPL-CCNC: <3 U/L (ref 4–60)
LYMPHOCYTES # BLD AUTO: 0.7 K/UL (ref 1–4.8)
LYMPHOCYTES NFR BLD: 6.6 % (ref 18–48)
MCH RBC QN AUTO: 29.1 PG (ref 27–31)
MCHC RBC AUTO-ENTMCNC: 33.8 G/DL (ref 32–36)
MCV RBC AUTO: 86 FL (ref 82–98)
MICROSCOPIC COMMENT: ABNORMAL
MONOCYTES # BLD AUTO: 0.4 K/UL (ref 0.3–1)
MONOCYTES NFR BLD: 4.1 % (ref 4–15)
NEUTROPHILS # BLD AUTO: 9.4 K/UL (ref 1.8–7.7)
NEUTROPHILS NFR BLD: 88.8 % (ref 38–73)
NITRITE UR QL STRIP: NEGATIVE
NRBC BLD-RTO: 0 /100 WBC
PH UR STRIP: 7 [PH] (ref 5–8)
PLATELET # BLD AUTO: 271 K/UL (ref 150–450)
PMV BLD AUTO: 10.2 FL (ref 9.2–12.9)
POTASSIUM SERPL-SCNC: 3.7 MMOL/L (ref 3.5–5.1)
PROT SERPL-MCNC: 9.1 G/DL (ref 6–8.4)
PROT UR QL STRIP: ABNORMAL
RBC # BLD AUTO: 5.08 M/UL (ref 4–5.4)
RBC #/AREA URNS HPF: 6 /HPF (ref 0–4)
SODIUM SERPL-SCNC: 137 MMOL/L (ref 136–145)
SP GR UR STRIP: >1.03 (ref 1–1.03)
SQUAMOUS #/AREA URNS HPF: 4 /HPF
URN SPEC COLLECT METH UR: ABNORMAL
UROBILINOGEN UR STRIP-ACNC: NEGATIVE EU/DL
WBC # BLD AUTO: 10.52 K/UL (ref 3.9–12.7)
WBC #/AREA URNS HPF: 1 /HPF (ref 0–5)

## 2024-01-05 PROCEDURE — 85025 COMPLETE CBC W/AUTO DIFF WBC: CPT | Performed by: NURSE PRACTITIONER

## 2024-01-05 PROCEDURE — 25000003 PHARM REV CODE 250: Performed by: NURSE PRACTITIONER

## 2024-01-05 PROCEDURE — 83690 ASSAY OF LIPASE: CPT | Performed by: NURSE PRACTITIONER

## 2024-01-05 PROCEDURE — 81025 URINE PREGNANCY TEST: CPT | Performed by: NURSE PRACTITIONER

## 2024-01-05 PROCEDURE — 99284 EMERGENCY DEPT VISIT MOD MDM: CPT

## 2024-01-05 PROCEDURE — 81001 URINALYSIS AUTO W/SCOPE: CPT | Performed by: NURSE PRACTITIONER

## 2024-01-05 PROCEDURE — 96375 TX/PRO/DX INJ NEW DRUG ADDON: CPT

## 2024-01-05 PROCEDURE — 96374 THER/PROPH/DIAG INJ IV PUSH: CPT

## 2024-01-05 PROCEDURE — 80053 COMPREHEN METABOLIC PANEL: CPT | Performed by: NURSE PRACTITIONER

## 2024-01-05 PROCEDURE — 63600175 PHARM REV CODE 636 W HCPCS: Performed by: NURSE PRACTITIONER

## 2024-01-05 PROCEDURE — 96361 HYDRATE IV INFUSION ADD-ON: CPT

## 2024-01-05 PROCEDURE — 63600175 PHARM REV CODE 636 W HCPCS: Performed by: EMERGENCY MEDICINE

## 2024-01-05 RX ORDER — DICYCLOMINE HYDROCHLORIDE 10 MG/1
10 CAPSULE ORAL 3 TIMES DAILY
Qty: 30 CAPSULE | Refills: 1 | Status: SHIPPED | OUTPATIENT
Start: 2024-01-05

## 2024-01-05 RX ORDER — ONDANSETRON 4 MG/1
4 TABLET, ORALLY DISINTEGRATING ORAL EVERY 6 HOURS PRN
Qty: 30 TABLET | Refills: 0 | Status: SHIPPED | OUTPATIENT
Start: 2024-01-05

## 2024-01-05 RX ORDER — ONDANSETRON HYDROCHLORIDE 2 MG/ML
4 INJECTION, SOLUTION INTRAVENOUS
Status: COMPLETED | OUTPATIENT
Start: 2024-01-05 | End: 2024-01-05

## 2024-01-05 RX ORDER — DROPERIDOL 2.5 MG/ML
1.25 INJECTION, SOLUTION INTRAMUSCULAR; INTRAVENOUS EVERY 6 HOURS PRN
Status: DISCONTINUED | OUTPATIENT
Start: 2024-01-05 | End: 2024-01-06 | Stop reason: HOSPADM

## 2024-01-05 RX ORDER — CAPSAICIN 0.75 MG/G
CREAM TOPICAL 3 TIMES DAILY
Qty: 57 G | Refills: 1 | Status: SHIPPED | OUTPATIENT
Start: 2024-01-05

## 2024-01-05 RX ADMIN — DROPERIDOL 1.25 MG: 2.5 INJECTION, SOLUTION INTRAMUSCULAR; INTRAVENOUS at 10:01

## 2024-01-05 RX ADMIN — ONDANSETRON 4 MG: 2 INJECTION INTRAMUSCULAR; INTRAVENOUS at 07:01

## 2024-01-05 RX ADMIN — SODIUM CHLORIDE 1000 ML: 0.9 INJECTION, SOLUTION INTRAVENOUS at 07:01

## 2024-01-06 NOTE — FIRST PROVIDER EVALUATION
Emergency Department TeleTriage Encounter Note      CHIEF COMPLAINT    Chief Complaint   Patient presents with    Vomiting     Vomiting since 4am today.  C/o generalized abdominal pain.      Abdominal Pain       VITAL SIGNS   Initial Vitals   BP Pulse Resp Temp SpO2   01/05/24 1859 01/05/24 1901 01/05/24 1859 01/05/24 1859 01/05/24 1859   (!) 151/88 88 16 98 °F (36.7 °C) 100 %      MAP       --                   ALLERGIES    Review of patient's allergies indicates:  No Known Allergies    PROVIDER TRIAGE NOTE  TeleTriage Note: Eva Escalera, a nontoxic/well appearing, 38 y.o. female, presented to the ED with c/o abdominal cramping that began around 4 am this morning. She has a history of IBS and her medication is not working. She is having associated vomiting and diarrhea.     All ED beds are full at present; patient notified of this status.  Patient seen and medically screened by Nurse Practitioner via teletriage. Orders initiated at triage to expedite care.  Patient is stable to return to the waiting room and will be placed in an ED bed when available.  Care will be transferred to an alternate provider when patient has been placed in an Exam Room from the [facility] for physical exam, additional orders, and disposition.  7:19 PM Valorie Pelaez DNP, FNP-C        ORDERS  Labs Reviewed - No data to display    ED Orders (720h ago, onward)      Start Ordered     Status Ordering Provider    01/05/24 1921 01/05/24 1920  Vital signs  Every 2 hours         Ordered VALORIE PELAEZ    01/05/24 1921 01/05/24 1920  Diet NPO  Diet effective now         Ordered VALORIE PELAEZ    Unscheduled 01/05/24 1920  Insert peripheral IV  Once         Ordered VALORIE PELAEZ    Unscheduled 01/05/24 1920  CBC W/ AUTO DIFFERENTIAL  STAT         Ordered VALORIE PELAEZ    Unscheduled 01/05/24 1920  Comp. Metabolic Panel  STAT         Ordered VALORIE PELAEZ    Unscheduled 01/05/24 1920  Lipase  STAT         Ordered WILIAN  JENARO BARCLAY    Unscheduled 01/05/24 1920  Urinalysis, Reflex to Urine Culture Urine, Clean Catch  STAT         Ordered JENARO CEDENO    Unscheduled 01/05/24 1920  POCT urine pregnancy  Once         Ordered JENARO CEDENO              Virtual Visit Note: The provider triage portion of this emergency department evaluation and documentation was performed via [employer], a HIPAA-compliant telemedicine application, in concert with a tele-presenter in the room. A face to face patient evaluation with one of my colleagues will occur once the patient is placed in an emergency department room.      DISCLAIMER: This note was prepared with my[employer] voice recognition transcription software. Garbled syntax, mangled pronouns, and other bizarre constructions may be attributed to that software system.

## 2024-01-06 NOTE — ED NOTES
Bed: 15  Expected date:   Expected time:   Means of arrival:   Comments:  LEVEL 4 OR DEF DISCHARGE ONLY

## 2024-01-06 NOTE — ED PROVIDER NOTES
Encounter Date: 2024       History     Chief Complaint   Patient presents with    Vomiting     Vomiting since 4am today.  C/o generalized abdominal pain.      Abdominal Pain     Emergent evaluation of a 38-year-old female with history of IBS, history of intractable nausea vomiting and diarrhea, chronic abdominal pain, autoimmune disease, anxiety and depression, menorrhagia, colitis, 2 to use presents to the ER due to abdominal pain that began in bilateral lower quadrant of the abdomen at 4:00 a.m..  And then she developed with nausea vomiting and diarrhea.  She reports she was not able to eat today.  Diarrhea eventually resolved.  Pain moved from the lower abdomen to the epigastric region in his cramping turning sensation.  She reports that she continued to vomit until a proximally 7:00 p.m. when she arrived to the ER no vomiting some time ongoing nausea.  She reports pain is 8/10.  She has been seen in the ER several times for this in the past.  No fevers.  No urinary symptoms.  No blood in the emesis or stool.  No vaginal symptoms.  Patient seen her dermatologist this morning and had an injection into a lesion in the pubic area with steroids.  She reports normally hot showers do improve pain today hot child did not improve her pain    No vomiting since arrival in the ER 3.5 hours ago      Review of patient's allergies indicates:  No Known Allergies  Past Medical History:   Diagnosis Date    Anxiety     Autoimmune disease     SEES RHEUMATOLOGIST, positve STACIE    Depression     General anesthetics causing adverse effect in therapeutic use     allergic to propofol    Herpes     Menometrorrhagia     PONV (postoperative nausea and vomiting)      Past Surgical History:   Procedure Laterality Date    ANAL SPHINCTEROTOMY N/A 2020    Procedure: SPHINCTEROTOMY, ANAL - Fissurectomy;  Surgeon: James Tian MD;  Location: [facility];  Service: General;  Laterality: N/A;    BREAST RECONSTRUCTION        SECTION      COLONOSCOPY N/A 01/22/2019    Procedure: COLONOSCOPY;  Surgeon: Camacho Dove MD;  Location: [facility] ENDO;  Service: Endoscopy;  Laterality: N/A;    COLONOSCOPY N/A 8/31/2023    Procedure: COLONOSCOPY;  Surgeon: Camacho Dove MD;  Location: [facility] ENDO;  Service: Endoscopy;  Laterality: N/A;    COSMETIC SURGERY  09/23/2020    liposuction, fat graft to buttocks    DIGITAL RECTAL EXAMINATION UNDER ANESTHESIA N/A 12/23/2020    Procedure: EXAM UNDER ANESTHESIA, DIGITAL, RECTUM;  Surgeon: James Tian MD;  Location: [facility] OR;  Service: General;  Laterality: N/A;    EXAMINATION UNDER ANESTHESIA N/A 06/02/2021    Procedure: EXAM UNDER ANESTHESIA;  Surgeon: James Tian MD;  Location: [facility] OR;  Service: General;  Laterality: N/A;    FISTULOTOMY N/A 06/02/2021    Procedure: FISTULOTOMY;  Surgeon: James Tian MD;  Location: [facility] OR;  Service: General;  Laterality: N/A;    HYSTEROSCOPY WITH DILATION AND CURETTAGE OF UTERUS N/A 12/17/2018    Procedure: HYSTEROSCOPY, WITH DILATION AND CURETTAGE OF UTERUS;  Surgeon: Gloria Umaña MD;  Location: [facility] OR;  Service: OB/GYN;  Laterality: N/A;    LASER ABLATION OF THE CERVIX      LIPOMA RESECTION      LIPOSUCTION OF ABDOMEN      MYOMECTOMY N/A 12/17/2018    Procedure: MYOMECTOMY;  Surgeon: Gloria Umaña MD;  Location: [facility] OR;  Service: OB/GYN;  Laterality: N/A;  myosure REF RM- [id number]  time= 0.45  Aqulex Ref [id number]  [id number]     Family History   Adopted: Yes     Social History     Tobacco Use    Smoking status: Some Days     Types: Cigarettes    Smokeless tobacco: Never    Tobacco comments:     Pt states that she is not currently smoking. Has been tobacco free. Seen for inpatient smoking cessation on 8/3/2023   Substance Use Topics    Alcohol use: Yes     Comment: socially    Drug use: Yes     Types: Marijuana     Review of Systems   Constitutional:  Positive for activity change and appetite change. Negative  for chills, diaphoresis, fatigue and fever.   HENT:  Negative for congestion, postnasal drip, rhinorrhea and sore throat.    Respiratory:  Negative for cough, chest tightness, shortness of breath, wheezing and stridor.    Cardiovascular:  Negative for chest pain and palpitations.   Gastrointestinal:  Positive for abdominal distention, abdominal pain, diarrhea, nausea and vomiting. Negative for blood in stool and constipation.   Genitourinary:  Negative for difficulty urinating, dysuria, flank pain, frequency, hematuria, urgency and vaginal bleeding.   Musculoskeletal:  Negative for arthralgias, back pain, myalgias, neck pain and neck stiffness.   Skin:  Negative for rash.   Neurological:  Negative for dizziness, syncope, weakness, light-headedness and headaches.   Hematological:  Does not bruise/bleed easily.   Psychiatric/Behavioral:  Negative for confusion. The patient is not nervous/anxious.    All other systems reviewed and are negative.      Physical Exam     Initial Vitals   BP Pulse Resp Temp SpO2   01/05/24 1859 01/05/24 1901 01/05/24 1859 01/05/24 1859 01/05/24 1859   (!) 151/88 88 16 98 °F (36.7 °C) 100 %      MAP       --                Physical Exam    Nursing note and vitals reviewed.  Constitutional: She appears well-developed and well-nourished. She is not diaphoretic. No distress.   HENT:   Head: Normocephalic and atraumatic.   Right Ear: External ear normal.   Left Ear: External ear normal.   Nose: Nose normal.   Mouth/Throat: Oropharynx is clear and moist.   Eyes: Conjunctivae and EOM are normal. Pupils are equal, round, and reactive to light.   Neck: Neck supple. No tracheal deviation present.   Normal range of motion.  Cardiovascular:  Normal rate, regular rhythm, normal heart sounds and intact distal pulses.     Exam reveals no gallop and no friction rub.       No murmur heard.  Pulmonary/Chest: Breath sounds normal. No stridor. No respiratory distress. She has no wheezes. She has no rhonchi.  She has no rales. She exhibits no tenderness.   Abdominal: Abdomen is soft. Bowel sounds are normal. She exhibits no distension and no mass. There is abdominal tenderness.   Tenderness in epigastrium There is no rebound and no guarding.   Musculoskeletal:         General: No edema. Normal range of motion.      Cervical back: Normal range of motion and neck supple.     Neurological: She is alert and oriented to person, place, and time. She has normal strength. No cranial nerve deficit or sensory deficit.   Skin: Skin is warm and dry. No rash noted. No erythema. No pallor.   Psychiatric: She has a normal mood and affect. Her behavior is normal. Judgment and thought content normal.         ED Course   Procedures  Labs Reviewed   CBC W/ AUTO DIFFERENTIAL - Abnormal; Notable for the following components:       Result Value    Gran # (ANC) 9.4 (*)     Lymph # 0.7 (*)     Gran % 88.8 (*)     Lymph % 6.6 (*)     All other components within normal limits   COMPREHENSIVE METABOLIC PANEL - Abnormal; Notable for the following components:    CO2 19 (*)     Glucose 139 (*)     Total Protein 9.1 (*)     All other components within normal limits   LIPASE - Abnormal; Notable for the following components:    Lipase <3 (*)     All other components within normal limits   URINALYSIS, REFLEX TO URINE CULTURE - Abnormal; Notable for the following components:    Specific Gravity, UA >1.030 (*)     Protein, UA 3+ (*)     Ketones, UA 2+ (*)     Occult Blood UA 2+ (*)     All other components within normal limits    Narrative:     Specimen Source->Urine   URINALYSIS MICROSCOPIC - Abnormal; Notable for the following components:    RBC, UA 6 (*)     Hyaline Casts, UA 11 (*)     All other components within normal limits    Narrative:     Specimen Source->Urine   POCT URINE PREGNANCY          Imaging Results    None          Medications   droPERidol injection 1.25 mg (1.25 mg Intravenous Given 1/5/24 2234)   ondansetron injection 4 mg (4 mg  Intravenous Given 1/5/24 1941)   sodium chloride 0.9% bolus 1,000 mL 1,000 mL (0 mLs Intravenous Stopped 1/5/24 2127)     Medical Decision Making  Emergent evaluation of a 38-year-old female with history of IBS, history of intractable nausea vomiting and diarrhea, chronic abdominal pain, autoimmune disease, anxiety and depression, menorrhagia, colitis, 2 to use presents to the ER due to abdominal pain that began in bilateral lower quadrant of the abdomen at 4:00 a.m..  And then she developed with nausea vomiting and diarrhea.  She reports she was not able to eat today.  Diarrhea eventually resolved.  Pain moved from the lower abdomen to the epigastric region in his cramping turning sensation.  She reports that she continued to vomit until a proximally 7:00 p.m. when she arrived to the ER no vomiting some time ongoing nausea.  She reports pain is 8/10.  She has been seen in the ER several times for this in the past.  No fevers.  No urinary symptoms.  No blood in the emesis or stool.  No vaginal symptoms.  Patient seen her dermatologist this morning and had an injection into a lesion in the pubic area with steroids.    No vomiting since arrival in the ER 3.5 hours ago  On physical exam patient was pacing the hallway holding an emesis bag.  Blood pressure 151/88 pulse 88 temp 98° sats 100% on room air respirations 16 clear breath sounds bilaterally normal cardiac exam normal bowel sounds soft abdomen tenderness in epigastrium.  No rebound or guarding  MDM    Patient presents for emergent evaluation of acute 1 day of abdominal pain nausea vomiting diarrhea that poses a threat to life and/or bodily function.   Differential diagnosis includes but was not limited to acute pancreatitis, acute cholecystitis and cholangitis, colitis, acute appendicitis, urinary tract infection, ovarian torsion, PID, TOA, cannabis hyperemesis pyelonephritis, kidney stone, volvulus, small bowel obstruction, enteritis, gastritis, colitis,  mesenteric ischemia, AAA, AAA rupture, aortic dissection, constipation, upper or lower gi bleeding, traumatic injury.   .   In the ED patient found to have acute epigastric abdominal pain nausea vomiting diarrhea  I ordered labs and personally reviewed them.  Labs significant for see below.      Discharge MDM     Patient was managed in the ED with IV 1 L normal saline, 4 mg of Zofran no further vomiting patient was still having abdominal pain will try droperidol to see if this improves her symptoms patient sees a GI physician Dr. Dove and has been told to increase her fiber supplements she will try to purchase this supplement he was previously recommended.  I will discharge her home with Bentyl.  And Zofran which she was taken in the past  The response to treatment was good patient reports improvement after droperidol.  Symptoms are consistent with cannabis hyperemesis we discussed this and marijuana cessation.  Will prescribed capsaicin cream  Patient was discharged in stable condition.  Detailed return precautions discussed.  Patient was told to follow up with primary care physician or specialist based on their diagnosis  Beatriz Paul MD      Amount and/or Complexity of Data Reviewed  Labs: ordered.     Details: UPT negative  Normal CBC CMP with bicarb 19 glucose 139  Negative lipase  Urine with increased specific gravity 3+ protein 2+ ketones 2+ blood otherwise normal    Risk  OTC drugs.  Prescription drug management.                                      Clinical Impression:  Final diagnoses:  [R11.2] Nausea and vomiting, unspecified vomiting type (Primary)  [R10.10] Pain of upper abdomen  [F12.188] Cannabis hyperemesis syndrome concurrent with and due to cannabis abuse          ED Disposition Condition    Discharge Stable          ED Prescriptions       Medication Sig Dispense Start Date End Date Auth. Provider    dicyclomine (BENTYL) 10 MG capsule Take 1 capsule (10 mg total) by mouth 3 (three) times  daily. 30 capsule 1/5/2024 -- Beatriz Paul MD    ondansetron (ZOFRAN-ODT) 4 MG TbDL Take 1 tablet (4 mg total) by mouth every 6 (six) hours as needed (nausea or vomiting). 30 tablet 1/5/2024 -- Beatriz Paul MD    capsicum 0.075% (ZOSTRIX) 0.075 % topical cream Apply topically 3 (three) times daily. 57 g 1/5/2024 -- Beatriz Paul MD          Follow-up Information       Follow up With Specialties Details Why Contact Info Additional Information    Camacho Dove MD Gastroenterology Schedule an appointment as soon as possible for a visit  If your symptoms do not improve [address] [facility] [address]  [phone]       [facility]  Schedule an appointment as soon as possible for a visit   [address] [facility] [address]  [phone]       [facility] - Emergency Dept Emergency Medicine Go to  If symptoms worsen [address] [facility] [address]  [phone] 1st floor             Beatriz Paul MD  01/05/24 5773       Beatriz Paul MD  01/05/24 3294

## 2024-02-14 ENCOUNTER — HOSPITAL ENCOUNTER (OUTPATIENT)
Dept: RADIOLOGY | Facility: HOSPITAL | Age: 39
Discharge: HOME | End: 2024-02-14
Attending: SPECIALIST
Payer: MEDICAID

## 2024-02-14 DIAGNOSIS — R92.8 ABNORMAL MAMMOGRAM: ICD-10-CM

## 2024-02-14 PROCEDURE — 76642 ULTRASOUND BREAST LIMITED: CPT | Mod: TC,PO,RT

## 2024-02-19 ENCOUNTER — PATIENT OUTREACH (OUTPATIENT)
Dept: EMERGENCY MEDICINE | Facility: HOSPITAL | Age: 39
End: 2024-02-19

## 2024-08-28 ENCOUNTER — OFFICE VISIT (OUTPATIENT)
Dept: URGENT CARE | Facility: CLINIC | Age: 39
End: 2024-08-28
Payer: MEDICAID

## 2024-08-28 VITALS
RESPIRATION RATE: 16 BRPM | SYSTOLIC BLOOD PRESSURE: 122 MMHG | DIASTOLIC BLOOD PRESSURE: 88 MMHG | BODY MASS INDEX: 28.68 KG/M2 | WEIGHT: 175 LBS | TEMPERATURE: 99 F | OXYGEN SATURATION: 97 % | HEART RATE: 86 BPM

## 2024-08-28 DIAGNOSIS — J01.40 SUBACUTE PANSINUSITIS: Primary | ICD-10-CM

## 2024-08-28 PROCEDURE — 99213 OFFICE O/P EST LOW 20 MIN: CPT | Mod: S$GLB,,, | Performed by: NURSE PRACTITIONER

## 2024-08-28 RX ORDER — BROMPHENIRAMINE MALEATE, PSEUDOEPHEDRINE HYDROCHLORIDE, AND DEXTROMETHORPHAN HYDROBROMIDE 2; 30; 10 MG/5ML; MG/5ML; MG/5ML
10 SYRUP ORAL EVERY 6 HOURS PRN
Qty: 118 ML | Refills: 0 | Status: SHIPPED | OUTPATIENT
Start: 2024-08-28 | End: 2024-09-04

## 2024-08-28 RX ORDER — AZELASTINE 1 MG/ML
1 SPRAY, METERED NASAL 2 TIMES DAILY PRN
Qty: 30 ML | Refills: 0 | Status: SHIPPED | OUTPATIENT
Start: 2024-08-28 | End: 2025-08-28

## 2024-08-28 RX ORDER — AMOXICILLIN AND CLAVULANATE POTASSIUM 875; 125 MG/1; MG/1
1 TABLET, FILM COATED ORAL EVERY 12 HOURS
Qty: 14 TABLET | Refills: 0 | Status: SHIPPED | OUTPATIENT
Start: 2024-08-28 | End: 2024-09-04

## 2024-08-28 NOTE — PROGRESS NOTES
Subjective:      Patient ID: Eva Escalera is a 39 y.o. female.    Vitals:  weight is 79.4 kg (175 lb). Her oral temperature is 98.7 °F (37.1 °C). Her blood pressure is 122/88 and her pulse is 86. Her respiration is 16 and oxygen saturation is 97%.     Chief Complaint: Nasal Congestion    39-year-old female seen for sinusitis.  She states symptoms began 10 days ago with a cough.  She states she has recovered from cough, fever, body aches but sinusitis persists.  She states it feels like it has been worsening over the last few days and is causing facial pain now.  There has been no fever.  She reports blowing her nose multiple times a day and producing copious amounts of thick secretions.        Constitution: Negative for chills and fever.   HENT:  Positive for congestion and sinus pressure. Negative for ear pain and sore throat.    Cardiovascular:  Negative for chest pain, palpitations and sob on exertion.   Respiratory:  Negative for cough and shortness of breath.    Gastrointestinal:  Negative for nausea and vomiting.   Musculoskeletal:  Negative for muscle ache.   Skin:  Negative for rash.   Neurological:  Negative for dizziness, light-headedness, passing out, disorientation and altered mental status.   Psychiatric/Behavioral:  Negative for altered mental status, disorientation and confusion.       Objective:     Physical Exam   Constitutional: She is oriented to person, place, and time. She appears well-developed. She is cooperative.  Non-toxic appearance. She does not appear ill. No distress.   HENT:   Head: Normocephalic and atraumatic.   Ears:   Right Ear: Hearing and external ear normal.   Left Ear: Hearing and external ear normal.   Nose: Rhinorrhea, sinus tenderness and congestion present. No mucosal edema, purulent discharge or nasal deformity. No epistaxis. Right sinus exhibits maxillary sinus tenderness. Right sinus exhibits no frontal sinus tenderness. Left sinus exhibits maxillary sinus  tenderness. Left sinus exhibits no frontal sinus tenderness.   Mouth/Throat: Uvula is midline, oropharynx is clear and moist and mucous membranes are normal. Mucous membranes are moist. No trismus in the jaw. Normal dentition. No uvula swelling. No oropharyngeal exudate, posterior oropharyngeal edema, posterior oropharyngeal erythema, tonsillar abscesses or cobblestoning. Tonsils are 1+ on the right. Tonsils are 1+ on the left. No tonsillar exudate.   Eyes: Conjunctivae and lids are normal. No scleral icterus.   Neck: Trachea normal and phonation normal. Neck supple. No edema present. No erythema present. No neck rigidity present.   Cardiovascular: Normal rate, regular rhythm, normal heart sounds and normal pulses.   Pulmonary/Chest: Effort normal and breath sounds normal. No stridor. No respiratory distress. She has no decreased breath sounds. She has no rhonchi.   Abdominal: Normal appearance.   Musculoskeletal: Normal range of motion.         General: No deformity. Normal range of motion.   Lymphadenopathy:     She has no cervical adenopathy.   Neurological: no focal deficit. She is alert and oriented to person, place, and time. She exhibits normal muscle tone. Coordination normal.   Skin: Skin is warm, dry, intact, not diaphoretic and not pale. Capillary refill takes 2 to 3 seconds.   Psychiatric: Her speech is normal and behavior is normal. Judgment and thought content normal.   Nursing note and vitals reviewed.      Assessment:     1. Subacute pansinusitis        Plan:       Subacute pansinusitis  -     amoxicillin-clavulanate 875-125mg (AUGMENTIN) 875-125 mg per tablet; Take 1 tablet by mouth every 12 (twelve) hours. for 7 days  Dispense: 14 tablet; Refill: 0  -     azelastine (ASTELIN) 137 mcg (0.1 %) nasal spray; 1 spray (137 mcg total) by Nasal route 2 (two) times daily as needed for Rhinitis.  Dispense: 30 mL; Refill: 0  -     brompheniramine-pseudoeph-DM (BROMFED DM) 2-30-10 mg/5 mL Syrp; Take 10 mLs by  mouth every 6 (six) hours as needed (sinus congestion/drainage).  Dispense: 118 mL; Refill: 0      The physical exam findings were discussed with the patient and all questions answered.  I suspect bacterial etiology due to length of time and worsening of symptoms. We discussed symptom monitoring, conservative care methods, medication use, and follow up orders. she verbalized understanding and agreement with the plan of care.

## 2024-08-28 NOTE — PATIENT INSTRUCTIONS
Increase clear fluid intake  Start Augmentin and take it as prescribed.  Take each dose with food to limit GI upset.  Once you start these please take full course  Stop all current over the counter cough, cold, flu medicine  Tylenol/motrin otc for fever or pain  Use Astelin nasal spray and Bromfed syrup for sinus congestion and pressure.    Add a humidifier to your room at bedtime for respiratory comfort.  Follow up with PCP  Go immediately to the nearest emergency room for shortness of breath, chest pain,  or other emergent concern.  Return to clinic for new, worse, or unresolving symptoms

## 2025-07-01 ENCOUNTER — CLINICAL SUPPORT (OUTPATIENT)
Dept: URGENT CARE | Facility: CLINIC | Age: 40
End: 2025-07-01

## 2025-07-01 DIAGNOSIS — Z00.00 ROUTINE GENERAL MEDICAL EXAMINATION AT A HEALTH CARE FACILITY: Primary | ICD-10-CM

## 2025-07-01 PROCEDURE — 99499 UNLISTED E&M SERVICE: CPT | Mod: S$GLB,,,

## (undated) DEVICE — UNDERGLOVES BIOGEL PI SZ 6 LF

## (undated) DEVICE — CATH URETHRAL 16FR RED

## (undated) DEVICE — TRAY DRY SPONGE SCRUB W FOAM

## (undated) DEVICE — Device

## (undated) DEVICE — SET CYSTO IRRIGATION UNIV SPIK

## (undated) DEVICE — SOL PVP-I SCRUB 7.5% 4OZ

## (undated) DEVICE — DRESSING TELFA N ADH 3X8

## (undated) DEVICE — KIT MINOR SURGICAL SETUP

## (undated) DEVICE — PAD OB HS-77 STRL PREPACK 12S

## (undated) DEVICE — PAD SANITARY OB STERILE

## (undated) DEVICE — SEE MEDLINE ITEM 157116

## (undated) DEVICE — SEE MEDLINE ITEM 152622

## (undated) DEVICE — JELLY LUBRICANT STERILE 4 OZ

## (undated) DEVICE — SOL NS 1000CC